# Patient Record
Sex: MALE | Race: WHITE | Employment: OTHER | URBAN - METROPOLITAN AREA
[De-identification: names, ages, dates, MRNs, and addresses within clinical notes are randomized per-mention and may not be internally consistent; named-entity substitution may affect disease eponyms.]

---

## 2017-01-12 ENCOUNTER — TELEPHONE (OUTPATIENT)
Dept: SLEEP MEDICINE | Age: 57
End: 2017-01-12

## 2017-01-12 NOTE — TELEPHONE ENCOUNTER
Patient faxed in [address] form for  to sign. Talita Jazmin is unable to sign form until patient schedules 1 year f/up. Called patient and made him aware, he refused appt. Josinion form is scanned into media tab unsigned.

## 2017-01-15 DIAGNOSIS — E78.00 HYPERCHOLESTEREMIA: ICD-10-CM

## 2017-01-15 DIAGNOSIS — I10 HTN (HYPERTENSION), BENIGN: ICD-10-CM

## 2017-01-15 DIAGNOSIS — E11.9 TYPE 2 DIABETES MELLITUS WITHOUT COMPLICATION, WITHOUT LONG-TERM CURRENT USE OF INSULIN (HCC): Primary | ICD-10-CM

## 2017-01-15 DIAGNOSIS — Z12.5 PROSTATE CANCER SCREENING: ICD-10-CM

## 2017-01-16 ENCOUNTER — OFFICE VISIT (OUTPATIENT)
Dept: INTERNAL MEDICINE CLINIC | Age: 57
End: 2017-01-16

## 2017-01-16 VITALS
DIASTOLIC BLOOD PRESSURE: 80 MMHG | HEART RATE: 75 BPM | TEMPERATURE: 97.9 F | OXYGEN SATURATION: 97 % | BODY MASS INDEX: 37.05 KG/M2 | WEIGHT: 273.2 LBS | SYSTOLIC BLOOD PRESSURE: 140 MMHG

## 2017-01-16 DIAGNOSIS — I25.83 CORONARY ARTERY DISEASE DUE TO LIPID RICH PLAQUE: ICD-10-CM

## 2017-01-16 DIAGNOSIS — Z23 ENCOUNTER FOR IMMUNIZATION: ICD-10-CM

## 2017-01-16 DIAGNOSIS — I25.10 CORONARY ARTERY DISEASE DUE TO LIPID RICH PLAQUE: ICD-10-CM

## 2017-01-16 DIAGNOSIS — E11.9 TYPE 2 DIABETES MELLITUS WITHOUT COMPLICATION, WITHOUT LONG-TERM CURRENT USE OF INSULIN (HCC): Primary | ICD-10-CM

## 2017-01-16 DIAGNOSIS — E78.00 HYPERCHOLESTEREMIA: ICD-10-CM

## 2017-01-16 DIAGNOSIS — I10 HTN (HYPERTENSION), BENIGN: ICD-10-CM

## 2017-01-16 LAB — HBA1C MFR BLD HPLC: 6.5 % (ref 4.8–5.6)

## 2017-01-16 RX ORDER — TADALAFIL 5 MG/1
TABLET ORAL
Qty: 30 TAB | Refills: 11 | Status: SHIPPED | OUTPATIENT
Start: 2017-01-16 | End: 2018-02-19 | Stop reason: SDUPTHER

## 2017-01-16 RX ORDER — INDOMETHACIN 50 MG/1
CAPSULE ORAL
Qty: 30 CAP | Refills: 1 | Status: SHIPPED | OUTPATIENT
Start: 2017-01-16 | End: 2018-04-06 | Stop reason: SDUPTHER

## 2017-01-16 NOTE — MR AVS SNAPSHOT
Visit Information Date & Time Provider Department Dept. Phone Encounter #  
 1/16/2017 10:00 AM Sj Whyte, [address] [facility] [address] [phone] [id number] Follow-up Instructions Return in about 4 months (around 5/16/2017) for dm- 2 htn hld cad. Upcoming Health Maintenance Date Due Pneumococcal 19-64 Medium Risk (1 of 1 - PPSV23) 8/9/1980 COLONOSCOPY 2/4/2014 HEMOGLOBIN A1C Q6M 8/1/2016 INFLUENZA AGE 9 TO ADULT 8/1/2016 MICROALBUMIN Q1 8/3/2016 EYE EXAM RETINAL OR DILATED Q1 8/25/2016 FOOT EXAM Q1 2/1/2017 LIPID PANEL Q1 2/1/2017 DTaP/Tdap/Td series (2 - Td) 10/17/2023 Allergies as of 1/16/2017  Review Complete On: 1/16/2017 By: Ochoa Manley LPN No Known Allergies Current Immunizations  Never Reviewed Name Date Influenza Vaccine (Quad) PF  Incomplete Tdap 10/17/2013 Not reviewed this visit You Were Diagnosed With   
  
 Codes Comments Type 2 diabetes mellitus without complication, without long-term current use of insulin (HCC)    -  Primary ICD-10-CM: E11.9 ICD-9-CM: 250.00 HTN (hypertension), benign     ICD-10-CM: I10 
ICD-9-CM: 401.1 Hypercholesteremia     ICD-10-CM: E78.00 ICD-9-CM: 272.0 Coronary artery disease due to lipid rich plaque     ICD-10-CM: I25.10, I25.83 ICD-9-CM: 414.00, 414.3 Encounter for immunization     ICD-10-CM: J37 ICD-9-CM: V03.89 Vitals BP Pulse Temp Weight(growth percentile) SpO2 BMI  
 140/80 75 97.9 °F (36.6 °C) 273 lb 3.2 oz (123.9 kg) 97% 37.05 kg/m2 Smoking Status Light Tobacco Smoker Vitals History BMI and BSA Data Body Mass Index Body Surface Area 37.05 kg/m 2 2.51 m 2 Preferred Pharmacy Pharmacy Name Phone [id number] [address] [phone] Your Updated Medication List  
  
   
 This list is accurate as of: 1/16/17 10:32 AM.  Always use your most recent med list.  
  
  
  
  
 allopurinol 300 mg tablet Commonly known as:  ZYLOPRIM  
TAKE 1 TABLET BY MOUTH EVERY DAY  
  
 amLODIPine 5 mg tablet Commonly known as:  NORVASC  
  
 aspirin 81 mg tablet Take  by mouth daily. atenolol 100 mg tablet Commonly known as:  TENORMIN Take 1 tablet by mouth daily. bumetanide 1 mg tablet Commonly known as:  Marylen Baas TAKE 1 TABLET EVERY DAY  
  
 indomethacin 50 mg capsule Commonly known as:  INDOCIN  
TAKE ONE CAPSULE BY MOUTH 3 TIMES A DAY AS NEEDED  
  
 LIPITOR 80 mg tablet Generic drug:  atorvastatin Take 80 mg by mouth daily. losartan 50 mg tablet Commonly known as:  COZAAR  
TAKE 1 TABLET EVERY DAY  
  
 metFORMIN 500 mg tablet Commonly known as:  GLUCOPHAGE Take 2 Tabs by mouth two (2) times daily (with meals). multivitamin tablet Commonly known as:  ONE A DAY Take 1 Tab by mouth daily. omega-3 acid ethyl esters 1 gram capsule Commonly known as:  Minoo Chin Take 2 capsules by mouth two (2) times daily (with meals). omeprazole 20 mg capsule Commonly known as:  PRILOSEC  
TAKE ONE CAPSULE BY MOUTH EVERY DAY  
  
 tadalafil 5 mg tablet Commonly known as:  CIALIS  
TAKE 5 MG BY MOUTH AS NEEDED. Prescriptions Sent to Pharmacy Refills  
 indomethacin (INDOCIN) 50 mg capsule 1 Sig: TAKE ONE CAPSULE BY MOUTH 3 TIMES A DAY AS NEEDED Class: Normal  
 Pharmacy: [facility] [address] [place] [address] [address] Ph #: [phone]  
 tadalafil (CIALIS) 5 mg tablet 11 Sig: TAKE 5 MG BY MOUTH AS NEEDED. Class: Normal  
 Pharmacy: [facility] [address] [place] [address] [address] Ph #: [phone] We Performed the Following INFLUENZA VIRUS VAC QUAD,SPLIT,PRESV FREE SYRINGE 3/> YRS IM [id number] CPT(R)] Follow-up Instructions Return in about 4 months (around 5/16/2017) for dm- 2 htn hld cad. Introducing [facility] & HEALTH SERVICES! Dear Jose Lazo: Thank you for requesting a [employer] account. Our records indicate that you already have an active [employer] account. You can access your account anytime at [email or web address] Did you know that you can access your hospital and ER discharge instructions at any time in [employer]? You can also review all of your test results from your hospital stay or ER visit. Additional Information If you have questions, please visit the Frequently Asked Questions section of the [employer] website at [email or web address] Remember, [employer] is NOT to be used for urgent needs. For medical emergencies, dial 911. Now available from your iPhone and Android! Please provide this summary of care documentation to your next provider. Your primary care clinician is listed as Susanna WILLAMS. If you have any questions after today's visit, please call [phone].

## 2017-01-16 NOTE — PROGRESS NOTES
HISTORY OF PRESENT ILLNESS  Raffaele Poon Sr is a 55 y.o. male. HPI     F/u dm-2 htn hld CAD  No gout attacks this year   Needs cialis refill  Has been very good about taking meds since last visit and on a good diet  hsi daughter has been checking his fsbs at home but pt does not recall readings  No cp sxs-sees Dr. Rhett Yung in f/u soon  Less neuropathy sxs in feet, less tingling now  a1c 6.5 today  Still has not had his repeat colonoscopy -Dr Wei Adams but plans on scheduling this study      Patient Active Problem List    Diagnosis Date Noted    PAU on CPAP 02/01/2016    DM [facility]di[facility] type 2[facility] 10/19/2013    Obesity 03/08/2011    CAD (Coronary Artery Disease) - stent 1999 10/20/2009    HTN (hypertension), benign 10/20/2009    Gout 10/20/2009    Hypercholesteremia 10/20/2009    Osteoarthritis of knee 10/20/2009     Current Outpatient Prescriptions   Medication Sig Dispense Refill    indomethacin (INDOCIN) 50 mg capsule TAKE ONE CAPSULE BY MOUTH 3 TIMES A DAY AS NEEDED 30 Cap 1    tadalafil (CIALIS) 5 mg tablet TAKE 5 MG BY MOUTH AS NEEDED. 30 Tab 11    bumetanide (BUMEX) 1 mg tablet TAKE 1 TABLET EVERY DAY 90 Tab 2    metFORMIN (GLUCOPHAGE) 500 mg tablet Take 2 Tabs by mouth two (2) times daily (with meals). 360 Tab 3    multivitamin (ONE A DAY) tablet Take 1 Tab by mouth daily.  amLODIPine (NORVASC) 5 mg tablet   12    omega-3 acid ethyl esters (LOVAZA) 1 gram capsule Take 2 capsules by mouth two (2) times daily (with meals). 360 capsule 3    atenolol (TENORMIN) 100 mg tablet Take 1 tablet by mouth daily. 90 tablet 3    losartan (COZAAR) 50 mg tablet TAKE 1 TABLET EVERY DAY 30 tablet 6    allopurinol (ZYLOPRIM) 300 mg tablet TAKE 1 TABLET BY MOUTH EVERY DAY 30 tablet 6    omeprazole (PRILOSEC) 20 mg capsule TAKE ONE CAPSULE BY MOUTH EVERY DAY 30 Cap 6    atorvastatin (LIPITOR) 80 mg tablet Take 80 mg by mouth daily.  aspirin 81 mg Tab Take  by mouth daily.        No Known Allergies   Lab Results  Component Value Date/Time   Hemoglobin A1c 7.9 02/01/2016 09:46 AM   Hemoglobin A1c 7.2 08/03/2015 10:26 AM   Hemoglobin A1c 7.0 02/05/2014 08:50 AM   Glucose 183 02/01/2016 09:46 AM   Glucose  11/09/2010 09:51 AM   Microalb/Creat ratio (ug/mg creat.) 8.0 08/03/2015 10:26 AM   LDL, calculated 52 02/01/2016 09:46 AM   Creatinine 0.88 02/01/2016 09:46 AM      Lab Results  Component Value Date/Time   Cholesterol, total 155 02/01/2016 09:46 AM   HDL Cholesterol 36 02/01/2016 09:46 AM   LDL, calculated 52 02/01/2016 09:46 AM   Triglyceride 334 02/01/2016 09:46 AM       Lab Results  Component Value Date/Time   GFR est  02/01/2016 09:46 AM   GFR est non-AA 97 02/01/2016 09:46 AM   Creatinine 0.88 02/01/2016 09:46 AM   BUN 10 02/01/2016 09:46 AM   Sodium 139 02/01/2016 09:46 AM   Potassium 4.8 02/01/2016 09:46 AM   Chloride 101 02/01/2016 09:46 AM   CO2 23 02/01/2016 09:46 AM         ROS    Physical Exam   Constitutional: He appears well-developed and well-nourished. No distress. Appears stated age   HENT:   Head: Normocephalic. Cardiovascular: Normal rate, regular rhythm and normal heart sounds. Exam reveals no gallop and no friction rub. No murmur heard. Pulmonary/Chest: Effort normal and breath sounds normal. No respiratory distress. He has no wheezes. He has no rales. He exhibits no tenderness. Abdominal: Soft. Musculoskeletal: He exhibits no edema. Neurological: He is alert. Psychiatric: He has a normal mood and affect. Nursing note and vitals reviewed. ASSESSMENT and PLAN  Florian was seen today for hypertension, diabetes and cholesterol problem.     Diagnoses and all orders for this visit:    Type 2 diabetes mellitus without complication, without long-term current use of insulin (HCC)   amb a1c 6.5 good control   Continue diet and med adherence    HTN (hypertension), benign   Reasonable control, continue medicines    Hypercholesteremia   Continue statin    Coronary artery disease due to lipid rich plaque   Stable , no cp or angina    Hx gout   Refilled indocin, < 3 attacks per year    ED   Refill cialis    Encounter for immunization  -     Influenza virus vaccine (QUADRIVALENT PRES FREE SYRINGE) IM 3 years and older    Other orders  -     indomethacin (INDOCIN) 50 mg capsule; TAKE ONE CAPSULE BY MOUTH 3 TIMES A DAY AS NEEDED  -     tadalafil (CIALIS) 5 mg tablet; TAKE 5 MG BY MOUTH AS NEEDED. Follow-up Disposition:  Return in about 4 months (around 5/16/2017) for dm- 2 htn hld cad.

## 2017-01-17 LAB
ALBUMIN SERPL-MCNC: 4.5 G/DL (ref 3.5–5.5)
ALBUMIN/CREAT UR: 3.7 MG/G CREAT (ref 0–30)
ALBUMIN/GLOB SERPL: 3 {RATIO} (ref 1.1–2.5)
ALP SERPL-CCNC: 97 IU/L (ref 39–117)
ALT SERPL-CCNC: 25 IU/L (ref 0–44)
AST SERPL-CCNC: 17 IU/L (ref 0–40)
BILIRUB SERPL-MCNC: 0.5 MG/DL (ref 0–1.2)
BUN SERPL-MCNC: 15 MG/DL (ref 6–24)
BUN/CREAT SERPL: 18 (ref 9–20)
CALCIUM SERPL-MCNC: 9.3 MG/DL (ref 8.7–10.2)
CHLORIDE SERPL-SCNC: 100 MMOL/L (ref 96–106)
CHOLEST SERPL-MCNC: 154 MG/DL (ref 100–199)
CO2 SERPL-SCNC: 21 MMOL/L (ref 18–29)
CREAT SERPL-MCNC: 0.83 MG/DL (ref 0.76–1.27)
CREAT UR-MCNC: 126.8 MG/DL
ERYTHROCYTE [DISTWIDTH] IN BLOOD BY AUTOMATED COUNT: 13.4 % (ref 12.3–15.4)
GLOBULIN SER CALC-MCNC: 1.5 G/DL (ref 1.5–4.5)
GLUCOSE SERPL-MCNC: 262 MG/DL (ref 65–99)
HCT VFR BLD AUTO: 39.7 % (ref 37.5–51)
HDLC SERPL-MCNC: 32 MG/DL
HGB BLD-MCNC: 13 G/DL (ref 12.6–17.7)
LDLC SERPL CALC-MCNC: ABNORMAL MG/DL (ref 0–99)
MCH RBC QN AUTO: 29 PG (ref 26.6–33)
MCHC RBC AUTO-ENTMCNC: 32.7 G/DL (ref 31.5–35.7)
MCV RBC AUTO: 89 FL (ref 79–97)
MICROALBUMIN UR-MCNC: 4.7 UG/ML
PLATELET # BLD AUTO: 209 X10E3/UL (ref 150–379)
POTASSIUM SERPL-SCNC: 4.6 MMOL/L (ref 3.5–5.2)
PROT SERPL-MCNC: 6 G/DL (ref 6–8.5)
PSA SERPL-MCNC: 1.6 NG/ML (ref 0–4)
RBC # BLD AUTO: 4.48 X10E6/UL (ref 4.14–5.8)
SODIUM SERPL-SCNC: 140 MMOL/L (ref 134–144)
TRIGL SERPL-MCNC: 518 MG/DL (ref 0–149)
VLDLC SERPL CALC-MCNC: ABNORMAL MG/DL (ref 5–40)
WBC # BLD AUTO: 5.6 X10E3/UL (ref 3.4–10.8)

## 2017-01-17 RX ORDER — OMEPRAZOLE 20 MG/1
CAPSULE, DELAYED RELEASE ORAL
Qty: 90 CAP | Refills: 2 | Status: SHIPPED | OUTPATIENT
Start: 2017-01-17 | End: 2017-05-15 | Stop reason: SDUPTHER

## 2017-02-16 RX ORDER — METFORMIN HYDROCHLORIDE 500 MG/1
TABLET ORAL
Qty: 360 TAB | Refills: 1 | Status: SHIPPED | OUTPATIENT
Start: 2017-02-16 | End: 2017-09-23 | Stop reason: SDUPTHER

## 2017-05-14 DIAGNOSIS — E78.00 HYPERCHOLESTEREMIA: ICD-10-CM

## 2017-05-14 DIAGNOSIS — I10 HTN (HYPERTENSION), BENIGN: ICD-10-CM

## 2017-05-14 DIAGNOSIS — I25.10 CORONARY ARTERY DISEASE DUE TO LIPID RICH PLAQUE: ICD-10-CM

## 2017-05-14 DIAGNOSIS — I25.83 CORONARY ARTERY DISEASE DUE TO LIPID RICH PLAQUE: ICD-10-CM

## 2017-05-14 DIAGNOSIS — M10.9 INTERCRITICAL GOUT: ICD-10-CM

## 2017-05-14 DIAGNOSIS — E11.9 TYPE 2 DIABETES MELLITUS WITHOUT COMPLICATION, WITHOUT LONG-TERM CURRENT USE OF INSULIN (HCC): Primary | ICD-10-CM

## 2017-05-15 ENCOUNTER — OFFICE VISIT (OUTPATIENT)
Dept: INTERNAL MEDICINE CLINIC | Age: 57
End: 2017-05-15

## 2017-05-15 VITALS
SYSTOLIC BLOOD PRESSURE: 128 MMHG | DIASTOLIC BLOOD PRESSURE: 86 MMHG | HEIGHT: 72 IN | HEART RATE: 69 BPM | WEIGHT: 275 LBS | RESPIRATION RATE: 18 BRPM | BODY MASS INDEX: 37.25 KG/M2 | TEMPERATURE: 97.7 F | OXYGEN SATURATION: 97 %

## 2017-05-15 DIAGNOSIS — M72.0 DUPUYTREN'S CONTRACTURE OF BOTH HANDS: ICD-10-CM

## 2017-05-15 DIAGNOSIS — M54.32 SCIATICA OF LEFT SIDE: ICD-10-CM

## 2017-05-15 DIAGNOSIS — E78.00 HYPERCHOLESTEREMIA: ICD-10-CM

## 2017-05-15 DIAGNOSIS — E11.9 CONTROLLED TYPE 2 DIABETES MELLITUS WITHOUT COMPLICATION, WITHOUT LONG-TERM CURRENT USE OF INSULIN (HCC): Primary | ICD-10-CM

## 2017-05-15 DIAGNOSIS — M54.32 LEFT SIDED SCIATICA: Primary | ICD-10-CM

## 2017-05-15 DIAGNOSIS — M54.32 SCIATICA OF LEFT SIDE: Primary | ICD-10-CM

## 2017-05-15 DIAGNOSIS — I10 HTN (HYPERTENSION), BENIGN: ICD-10-CM

## 2017-05-15 NOTE — PROGRESS NOTES
Chief Complaint   Patient presents with    Hypertension    Diabetes    Hip Pain     c/o (L) Hip/Leg Pain using OTC Ibuprofen PRN    Hand Pain     Bilateral Hand Pain     1. Have you been to the ER, urgent care clinic since your last visit? Hospitalized since your last visit? No    2. Have you seen or consulted any other health care providers outside of the [address] [place] [address] since your last visit? Include any pap smears or colon screening.  No

## 2017-05-15 NOTE — MR AVS SNAPSHOT
Visit Information Date & Time Provider Department Dept. Phone Encounter #  
 5/15/2017 10:00 AM Kathy Carvajal, [address] [place] [address],[address] [phone] [id number] Follow-up Instructions Return in about 4 months (around 9/15/2017) for dm-2 htn hld. Upcoming Health Maintenance Date Due Pneumococcal 19-64 Medium Risk (1 of 1 - PPSV23) 8/9/1980 COLONOSCOPY 2/4/2014 EYE EXAM RETINAL OR DILATED Q1 8/25/2016 FOOT EXAM Q1 2/1/2017 HEMOGLOBIN A1C Q6M 7/16/2017 INFLUENZA AGE 9 TO ADULT 8/1/2017 MICROALBUMIN Q1 1/16/2018 LIPID PANEL Q1 1/16/2018 DTaP/Tdap/Td series (2 - Td) 10/17/2023 Allergies as of 5/15/2017  Review Complete On: 5/15/2017 By: Luz Hagen LPN No Known Allergies Current Immunizations  Never Reviewed Name Date Influenza Vaccine (Quad) PF 1/16/2017 Tdap 10/17/2013 Not reviewed this visit You Were Diagnosed With   
  
 Codes Comments Controlled type 2 diabetes mellitus without complication, without long-term current use of insulin ([facility]    -  Primary ICD-10-CM: E11.9 ICD-9-CM: 250.00 HTN (hypertension), benign     ICD-10-CM: I10 
ICD-9-CM: 401.1 Hypercholesteremia     ICD-10-CM: E78.00 ICD-9-CM: 272.0 Sciatica of left side     ICD-10-CM: M54.32 
ICD-9-CM: 724.3 Dupuytren's contracture of both hands     ICD-10-CM: M72.0 ICD-9-CM: 728.6 Vitals BP Pulse Temp Resp Height(growth percentile) Weight(growth percentile) 128/86 69 97.7 °F (36.5 °C) (Oral) 18 6' (1.829 m) 275 lb (124.7 kg) SpO2 BMI Smoking Status 97% 37.3 kg/m2 Light Tobacco Smoker Vitals History BMI and BSA Data Body Mass Index Body Surface Area  
 37.3 kg/m 2 2.52 m 2 Preferred Pharmacy Pharmacy Name Phone [id number] [place] [place] - [address] [phone] Your Updated Medication List  
  
   
 This list is accurate as of: 5/15/17 10:55 AM.  Always use your most recent med list.  
  
  
  
  
 allopurinol 300 mg tablet Commonly known as:  ZYLOPRIM  
TAKE 1 TABLET BY MOUTH EVERY DAY  
  
 amLODIPine 5 mg tablet Commonly known as:  NORVASC  
  
 aspirin 81 mg tablet Take  by mouth daily. atenolol 100 mg tablet Commonly known as:  TENORMIN Take 1 tablet by mouth daily. bumetanide 1 mg tablet Commonly known as:  Gurpreet James TAKE 1 TABLET EVERY DAY  
  
 indomethacin 50 mg capsule Commonly known as:  INDOCIN  
TAKE ONE CAPSULE BY MOUTH 3 TIMES A DAY AS NEEDED  
  
 LIPITOR 80 mg tablet Generic drug:  atorvastatin Take 80 mg by mouth daily. losartan 50 mg tablet Commonly known as:  COZAAR  
TAKE 1 TABLET EVERY DAY  
  
 metFORMIN 500 mg tablet Commonly known as:  GLUCOPHAGE  
TAKE 2 TABLETS BY MOUTH TWICE A DAY WITH MEALS  
  
 multivitamin tablet Commonly known as:  ONE A DAY Take 1 Tab by mouth daily. omega-3 acid ethyl esters 1 gram capsule Commonly known as:  Hiren Inoue Take 2 capsules by mouth two (2) times daily (with meals). omeprazole 20 mg capsule Commonly known as:  PRILOSEC  
TAKE ONE CAPSULE BY MOUTH EVERY DAY  
  
 tadalafil 5 mg tablet Commonly known as:  CIALIS  
TAKE 5 MG BY MOUTH AS NEEDED. We Performed the Following HEMOGLOBIN A1C WITH EAG [79850 CPT(R)] LIPID PANEL [01718 CPT(R)] METABOLIC PANEL, COMPREHENSIVE [15620 CPT(R)] REFERRAL TO ORTHOPEDIC SURGERY [REF62 Custom] Comments:  
 Please evaluate patient for dupuytrens contracture REFERRAL TO ORTHOPEDIC SURGERY [REF62 Custom] Comments:  
 Please evaluate patient for bacjk pain and sciatica. Follow-up Instructions Return in about 4 months (around 9/15/2017) for dm-2 htn hld. To-Do List   
 05/15/2017 Imaging:  XR SPINE LUMB 2 OR 3 V Referral Information Referral ID Referred By Referred To [id number] [facility] 2. Go Gutierrez M.D. [address] [place], [address] Visits Status Start Date End Date 1 New Request 5/15/17 5/15/18 If your referral has a status of pending review or denied, additional information will be sent to support the outcome of this decision. Referral ID Referred By Referred To  
 [id number] [place], [address] Phone: [phone] Visits Status Start Date End Date 1 New Request 5/15/17 5/15/18 If your referral has a status of pending review or denied, additional information will be sent to support the outcome of this decision. Introducing [facility] & HEALTH SERVICES! Dear Robbie Magdaleno: Thank you for requesting a [employer] account. Our records indicate that you already have an active [employer] account. You can access your account anytime at [email or web address] Did you know that you can access your hospital and ER discharge instructions at any time in [employer]? You can also review all of your test results from your hospital stay or ER visit. Additional Information If you have questions, please visit the Frequently Asked Questions section of the [employer] website at [email or web address] Remember, [employer] is NOT to be used for urgent needs. For medical emergencies, dial 911. Now available from your iPhone and Android! Please provide this summary of care documentation to your next provider. Your primary care clinician is listed as Hans WILLAMS. If you have any questions after today's visit, please call [phone].

## 2017-05-15 NOTE — PROGRESS NOTES
HISTORY OF PRESENT ILLNESS  Cathy Whitman Sr is a 55 y.o. male. HPI   F/u dm-2 htn hld cad  C/o 2-3 month hx of dull pain in lower back radiating down left leg to foot  , constant pain . advil 200mg q6 hrs helps  fsbs at home 120-160    Patient Active Problem List    Diagnosis Date Noted    PAU on CPAP 02/01/2016    DM type 2 (diabetes mellitus, type 2) ([facility] 10/19/2013    Obesity 03/08/2011    CAD (Coronary Artery Disease) - stent 1999 10/20/2009    HTN (hypertension), benign 10/20/2009    Gout 10/20/2009    Hypercholesteremia 10/20/2009    Osteoarthritis of knee 10/20/2009     Current Outpatient Prescriptions   Medication Sig Dispense Refill    metFORMIN (GLUCOPHAGE) 500 mg tablet TAKE 2 TABLETS BY MOUTH TWICE A DAY WITH MEALS 360 Tab 1    indomethacin (INDOCIN) 50 mg capsule TAKE ONE CAPSULE BY MOUTH 3 TIMES A DAY AS NEEDED 30 Cap 1    tadalafil (CIALIS) 5 mg tablet TAKE 5 MG BY MOUTH AS NEEDED. 30 Tab 11    bumetanide (BUMEX) 1 mg tablet TAKE 1 TABLET EVERY DAY 90 Tab 2    multivitamin (ONE A DAY) tablet Take 1 Tab by mouth daily.  amLODIPine (NORVASC) 5 mg tablet   12    omega-3 acid ethyl esters (LOVAZA) 1 gram capsule Take 2 capsules by mouth two (2) times daily (with meals). 360 capsule 3    atenolol (TENORMIN) 100 mg tablet Take 1 tablet by mouth daily. 90 tablet 3    losartan (COZAAR) 50 mg tablet TAKE 1 TABLET EVERY DAY 30 tablet 6    allopurinol (ZYLOPRIM) 300 mg tablet TAKE 1 TABLET BY MOUTH EVERY DAY 30 tablet 6    omeprazole (PRILOSEC) 20 mg capsule TAKE ONE CAPSULE BY MOUTH EVERY DAY 30 Cap 6    atorvastatin (LIPITOR) 80 mg tablet Take 80 mg by mouth daily.  aspirin 81 mg Tab Take  by mouth daily.        No Known Allergies   Lab Results  Component Value Date/Time   Hemoglobin A1c 7.9 02/01/2016 09:46 AM   Hemoglobin A1c 7.2 08/03/2015 10:26 AM   Hemoglobin A1c 7.0 02/05/2014 08:50 AM   Glucose 262 01/16/2017 10:54 AM   Glucose  11/09/2010 09:51 AM Microalb/Creat ratio (ug/mg creat.) 3.7 01/16/2017 10:54 AM   LDL, calculated Comment 01/16/2017 10:54 AM   Creatinine 0.83 01/16/2017 10:54 AM      Lab Results  Component Value Date/Time   Cholesterol, total 154 01/16/2017 10:54 AM   HDL Cholesterol 32 01/16/2017 10:54 AM   LDL, calculated Comment 01/16/2017 10:54 AM   Triglyceride 518 01/16/2017 10:54 AM       Lab Results  Component Value Date/Time   GFR est  01/16/2017 10:54 AM   GFR est non-AA 99 01/16/2017 10:54 AM   Creatinine 0.83 01/16/2017 10:54 AM   BUN 15 01/16/2017 10:54 AM   Sodium 140 01/16/2017 10:54 AM   Potassium 4.6 01/16/2017 10:54 AM   Chloride 100 01/16/2017 10:54 AM   CO2 21 01/16/2017 10:54 AM         ROS    Physical Exam   Constitutional: He appears well-developed and well-nourished. No distress. Appears stated age   HENT:   Head: Normocephalic. Cardiovascular: Normal rate and regular rhythm. Exam reveals no gallop and no friction rub. No murmur heard. Pulmonary/Chest: Effort normal and breath sounds normal. No respiratory distress. He has no wheezes. He has no rales. He exhibits no tenderness. Abdominal: Soft. Musculoskeletal: He exhibits no edema. SLR neg b/l  B/l dupuyttrens contracture   Neurological: He is alert. Psychiatric: He has a normal mood and affect. Nursing note and vitals reviewed. ASSESSMENT and PLAN  Florian was seen today for hypertension, diabetes, hip pain and hand pain.     Diagnoses and all orders for this visit:    Controlled type 2 diabetes mellitus without complication, without long-term current use of insulin (HCC)  -     HEMOGLOBIN A1C WITH EAG  -     METABOLIC PANEL, COMPREHENSIVE    HTN (hypertension), benign  -     METABOLIC PANEL, COMPREHENSIVE    controlled    Hypercholesteremia  -     METABOLIC PANEL, COMPREHENSIVE  -     LIPID PANEL   On high dose statin, low fat diet and exercise and lovaza    Sciatica of left side  -     XR SPINE LUMB 2 OR 3 V; Future  -     REFERRAL TO ORTHOPEDIC SURGERY    Dupuytren's contracture of both hands  -     REFERRAL TO ORTHOPEDIC SURGERY      Follow-up Disposition:  Return in about 4 months (around 9/15/2017) for dm-2 htn hld.

## 2017-05-24 ENCOUNTER — HOSPITAL ENCOUNTER (OUTPATIENT)
Dept: MRI IMAGING | Age: 57
Discharge: HOME | End: 2017-05-24
Attending: INTERNAL MEDICINE
Payer: COMMERCIAL

## 2017-05-24 DIAGNOSIS — M54.32 SCIATICA OF LEFT SIDE: ICD-10-CM

## 2017-05-24 PROCEDURE — 72148 MRI LUMBAR SPINE W/O DYE: CPT

## 2017-07-10 RX ORDER — ALLOPURINOL 300 MG/1
TABLET ORAL
Qty: 90 TAB | Refills: 2 | Status: SHIPPED | OUTPATIENT
Start: 2017-07-10 | End: 2018-03-23 | Stop reason: SDUPTHER

## 2017-09-26 ENCOUNTER — OFFICE VISIT (OUTPATIENT)
Dept: INTERNAL MEDICINE CLINIC | Age: 57
End: 2017-09-26

## 2017-09-26 VITALS
WEIGHT: 273 LBS | TEMPERATURE: 98.1 F | BODY MASS INDEX: 36.98 KG/M2 | OXYGEN SATURATION: 97 % | HEART RATE: 61 BPM | HEIGHT: 72 IN | SYSTOLIC BLOOD PRESSURE: 140 MMHG | DIASTOLIC BLOOD PRESSURE: 70 MMHG

## 2017-09-26 DIAGNOSIS — Z23 ENCOUNTER FOR IMMUNIZATION: ICD-10-CM

## 2017-09-26 DIAGNOSIS — M72.0 DUPUYTREN'S CONTRACTURE OF BOTH HANDS: ICD-10-CM

## 2017-09-26 DIAGNOSIS — M10.9 INTERCRITICAL GOUT: ICD-10-CM

## 2017-09-26 DIAGNOSIS — K63.5 POLYP OF COLON, UNSPECIFIED PART OF COLON, UNSPECIFIED TYPE: ICD-10-CM

## 2017-09-26 DIAGNOSIS — E78.00 HYPERCHOLESTEREMIA: ICD-10-CM

## 2017-09-26 DIAGNOSIS — I25.10 CORONARY ARTERY DISEASE INVOLVING NATIVE HEART WITHOUT ANGINA PECTORIS, UNSPECIFIED VESSEL OR LESION TYPE: ICD-10-CM

## 2017-09-26 DIAGNOSIS — I10 HTN (HYPERTENSION), BENIGN: ICD-10-CM

## 2017-09-26 DIAGNOSIS — E11.9 TYPE 2 DIABETES MELLITUS WITHOUT COMPLICATION, WITHOUT LONG-TERM CURRENT USE OF INSULIN (HCC): Primary | ICD-10-CM

## 2017-09-26 NOTE — MR AVS SNAPSHOT
Visit Information Date & Time Provider Department Dept. Phone Encounter #  
 9/26/2017  9:15 AM Elie Fu, [address] [place] [address],[address] [phone] [id number] Follow-up Instructions Return for dm-2 htn hld psa. Upcoming Health Maintenance Date Due Pneumococcal 19-64 Medium Risk (1 of 1 - PPSV23) 8/9/1980 COLONOSCOPY 2/4/2014 EYE EXAM RETINAL OR DILATED Q1 8/25/2016 FOOT EXAM Q1 2/1/2017 HEMOGLOBIN A1C Q6M 7/16/2017 INFLUENZA AGE 9 TO ADULT 8/1/2017 MICROALBUMIN Q1 1/16/2018 LIPID PANEL Q1 1/16/2018 DTaP/Tdap/Td series (2 - Td) 10/17/2023 Allergies as of 9/26/2017  Review Complete On: 9/26/2017 By: Daren Rudolph LPN No Known Allergies Current Immunizations  Never Reviewed Name Date Influenza Vaccine (Quad) PF  Incomplete, 1/16/2017 Tdap 10/17/2013 Not reviewed this visit You Were Diagnosed With   
  
 Codes Comments Type 2 diabetes mellitus without complication, without long-term current use of insulin (HCC)    -  Primary ICD-10-CM: E11.9 ICD-9-CM: 250.00 Encounter for immunization     ICD-10-CM: A11 ICD-9-CM: V03.89 HTN (hypertension), benign     ICD-10-CM: I10 
ICD-9-CM: 401.1 Hypercholesteremia     ICD-10-CM: E78.00 ICD-9-CM: 272.0 Intercritical gout     ICD-10-CM: M10.9 ICD-9-CM: 274.9 Polyp of colon, unspecified part of colon, unspecified type     ICD-10-CM: K63.5 ICD-9-CM: 211.3 Coronary artery disease involving native heart without angina pectoris, unspecified vessel or lesion type     ICD-10-CM: I25.10 ICD-9-CM: 414.01 Dupuytren's contracture of both hands     ICD-10-CM: M72.0 ICD-9-CM: 728.6 Vitals BP Pulse Temp Height(growth percentile) Weight(growth percentile) SpO2  
 140/70 61 98.1 °F (36.7 °C) (Oral) 6' (1.829 m) 273 lb (123.8 kg) 97% BMI Smoking Status 37.03 kg/m2 Light Tobacco Smoker Vitals History BMI and BSA Data Body Mass Index Body Surface Area  
 37.03 kg/m 2 2.51 m 2 Preferred Pharmacy Pharmacy Name Phone [id number] [place] - [address] [phone] Your Updated Medication List  
  
   
This list is accurate as of: 9/26/17 10:02 AM.  Always use your most recent med list.  
  
  
  
  
 allopurinol 300 mg tablet Commonly known as:  ZYLOPRIM  
TAKE 1 TABLET BY MOUTH EVERY DAY  
  
 amLODIPine 5 mg tablet Commonly known as:  NORVASC  
  
 aspirin 81 mg tablet Take  by mouth daily. atenolol 100 mg tablet Commonly known as:  TENORMIN Take 1 tablet by mouth daily. bumetanide 1 mg tablet Commonly known as:  Ana Noun TAKE 1 TABLET EVERY DAY  
  
 indomethacin 50 mg capsule Commonly known as:  INDOCIN  
TAKE ONE CAPSULE BY MOUTH 3 TIMES A DAY AS NEEDED  
  
 LIPITOR 80 mg tablet Generic drug:  atorvastatin Take 80 mg by mouth daily. losartan 50 mg tablet Commonly known as:  COZAAR  
TAKE 1 TABLET EVERY DAY  
  
 metFORMIN 500 mg tablet Commonly known as:  GLUCOPHAGE  
TAKE 2 TABLETS BY MOUTH TWICE A DAY WITH MEALS *90 DAY SUPPLY EXCEEDS PLAN LIMITS*  
  
 multivitamin tablet Commonly known as:  ONE A DAY Take 1 Tab by mouth daily. omega-3 acid ethyl esters 1 gram capsule Commonly known as:  Abraham Hacking Take 2 capsules by mouth two (2) times daily (with meals). omeprazole 20 mg capsule Commonly known as:  PRILOSEC  
TAKE ONE CAPSULE BY MOUTH EVERY DAY  
  
 tadalafil 5 mg tablet Commonly known as:  CIALIS  
TAKE 5 MG BY MOUTH AS NEEDED. We Performed the Following HEMOGLOBIN A1C WITH EAG [17617 CPT(R)] INFLUENZA VIRUS VAC QUAD,SPLIT,PRESV FREE SYRINGE IM [id number] CPT(R)] LIPID PANEL [75139 CPT(R)] METABOLIC PANEL, COMPREHENSIVE [95877 CPT(R)] NV IMMUNIZ ADMIN,1 SINGLE/COMB VAC/TOXOID [id number] CPT(R)] REFERRAL TO CARDIOLOGY [FVF05 Custom] REFERRAL TO GASTROENTEROLOGY [IZT13 Custom] REFERRAL TO ORTHOPEDICS [NRC963 Custom] Follow-up Instructions Return for dm-2 htn hld psa. Referral Information Referral ID Referred By Referred To  
  
 [id number] Merrill Whitaker [facility] [address] [place], [address] Visits Status Start Date End Date 1 New Request 9/26/17 9/26/18 If your referral has a status of pending review or denied, additional information will be sent to support the outcome of this decision. Referral ID Referred By Referred To  
 [id number] [facility] [address]  
   [address] [place], [address] Visits Status Start Date End Date 1 New Request 9/26/17 9/26/18 If your referral has a status of pending review or denied, additional information will be sent to support the outcome of this decision. Referral ID Referred By Referred To  
 [id number] FREEDOM WILLAMS [facility]  
   [address] [place], [address] Visits Status Start Date End Date 1 New Request 9/26/17 9/26/18 If your referral has a status of pending review or denied, additional information will be sent to support the outcome of this decision. Introducing [facility]! Dear Conchita Bence: Thank you for requesting a Splash account. Our records indicate that you already have an active Splash account. You can access your account anytime at [email or web address] Did you know that you can access your hospital and ER discharge instructions at any time in Splash? You can also review all of your test results from your hospital stay or ER visit. Additional Information If you have questions, please visit the Frequently Asked Questions section of the Splash website at [email or web address] Remember, Splash is NOT to be used for urgent needs.  For medical emergencies, dial 911. Now available from your iPhone and Android! Please provide this summary of care documentation to your next provider. Your primary care clinician is listed as Meliza WILLAMS. If you have any questions after today's visit, please call [phone].

## 2017-09-26 NOTE — PROGRESS NOTES
HISTORY OF PRESENT ILLNESS  Sarai Blas Sr is a 65 y.o. male. HPI   F/u dm-2 htn hld CAD  No fsbs readings  Had recent eye exam with Dr Efren Escobedo per pt  On low carb diet , small portions  Lots of walkinmg at work and at home  No gout attacks this year   Needs cialis refill  Has been very good about taking meds since last visit and on a good diet  No cp sxs-sees Dr. Matthews Learn in f/u soon  Less neuropathy sxs in feet, less tingling now  Still has not had his repeat colonoscopy -Dr Dee Puga but plans on scheduling this study    Patient Active Problem List    Diagnosis Date Noted    PAU on CPAP 02/01/2016    DM type 2 (diabetes mellitus, type 2) ([facility] [place] [address] 10/19/2013    Obesity 03/08/2011    CAD (Coronary Artery Disease) - stent 1999 10/20/2009    HTN (hypertension), benign 10/20/2009    Gout 10/20/2009    Hypercholesteremia 10/20/2009    Osteoarthritis of knee 10/20/2009     Current Outpatient Prescriptions   Medication Sig Dispense Refill    metFORMIN (GLUCOPHAGE) 500 mg tablet TAKE 2 TABLETS BY MOUTH TWICE A DAY WITH MEALS *90 DAY SUPPLY EXCEEDS PLAN LIMITS* 360 Tab 3    allopurinol (ZYLOPRIM) 300 mg tablet TAKE 1 TABLET BY MOUTH EVERY DAY 90 Tab 2    indomethacin (INDOCIN) 50 mg capsule TAKE ONE CAPSULE BY MOUTH 3 TIMES A DAY AS NEEDED 30 Cap 1    bumetanide (BUMEX) 1 mg tablet TAKE 1 TABLET EVERY DAY 90 Tab 2    multivitamin (ONE A DAY) tablet Take 1 Tab by mouth daily.  amLODIPine (NORVASC) 5 mg tablet   12    omega-3 acid ethyl esters (LOVAZA) 1 gram capsule Take 2 capsules by mouth two (2) times daily (with meals). 360 capsule 3    atenolol (TENORMIN) 100 mg tablet Take 1 tablet by mouth daily. 90 tablet 3    losartan (COZAAR) 50 mg tablet TAKE 1 TABLET EVERY DAY 30 tablet 6    omeprazole (PRILOSEC) 20 mg capsule TAKE ONE CAPSULE BY MOUTH EVERY DAY 30 Cap 6    atorvastatin (LIPITOR) 80 mg tablet Take 80 mg by mouth daily.  aspirin 81 mg Tab Take  by mouth daily.       tadalafil (CIALIS) 5 mg tablet TAKE 5 MG BY MOUTH AS NEEDED. 30 Tab 11     No Known Allergies  Social History   Substance Use Topics    Smoking status: Light Tobacco Smoker     Packs/day: 0.25     Types: Cigarettes    Smokeless tobacco: Never Used    Alcohol use 6.0 oz/week     12 Cans of beer per week      Lab Results  Component Value Date/Time   WBC 5.6 01/16/2017 10:54 AM   HGB 13.0 01/16/2017 10:54 AM   HCT 39.7 01/16/2017 10:54 AM   PLATELET 198 68/49/9430 10:54 AM   MCV 89 01/16/2017 10:54 AM     Lab Results  Component Value Date/Time   Hemoglobin A1c 7.9 02/01/2016 09:46 AM   Hemoglobin A1c 7.2 08/03/2015 10:26 AM   Hemoglobin A1c 7.0 02/05/2014 08:50 AM   Glucose 262 01/16/2017 10:54 AM   Glucose  11/09/2010 09:51 AM   Microalb/Creat ratio (ug/mg creat.) 3.7 01/16/2017 10:54 AM   LDL, calculated Comment 01/16/2017 10:54 AM   Creatinine 0.83 01/16/2017 10:54 AM      Lab Results  Component Value Date/Time   Cholesterol, total 154 01/16/2017 10:54 AM   Cholesterol (POC) 163 10/04/2011 08:23 AM   HDL Cholesterol 32 01/16/2017 10:54 AM   LDL, calculated Comment 01/16/2017 10:54 AM   LDL Cholesterol (POC) N/A 10/04/2011 08:23 AM   Triglyceride 518 01/16/2017 10:54 AM   Triglycerides (POC) 596 10/04/2011 08:23 AM   Lab Results  Component Value Date/Time   GFR est non-AA 99 01/16/2017 10:54 AM   GFR est  01/16/2017 10:54 AM   Creatinine 0.83 01/16/2017 10:54 AM   BUN 15 01/16/2017 10:54 AM   Sodium 140 01/16/2017 10:54 AM   Potassium 4.6 01/16/2017 10:54 AM   Chloride 100 01/16/2017 10:54 AM   CO2 21 01/16/2017 10:54 AM              ROS    Physical Exam   Constitutional: He appears well-developed and well-nourished. No distress. Appears stated age   HENT:   Head: Normocephalic. Cardiovascular: Normal rate, regular rhythm and normal heart sounds. Exam reveals no gallop and no friction rub. No murmur heard. Pulmonary/Chest: Effort normal and breath sounds normal. No respiratory distress. He has no wheezes.  He has no rales. He exhibits no tenderness. Abdominal: Soft. Musculoskeletal: He exhibits no edema. Neurological: He is alert. Psychiatric: He has a normal mood and affect. Nursing note and vitals reviewed. ASSESSMENT and PLAN  Diagnoses and all orders for this visit:    1. Encounter for immunization  -     Influenza virus vaccine (QUADRIVALENT PRES FREE SYRINGE) IM ([id number])  -     IN IMMUNIZ ADMIN,1 SINGLE/COMB VAC/TOXOID    2. Type 2 diabetes mellitus without complication, without long-term current use of insulin (HCC)  -     HEMOGLOBIN A1C WITH EAG  -     METABOLIC PANEL, COMPREHENSIVE   Request last eye exam report   Advised fsbs monitoring    3. HTN (hypertension), benign  -     METABOLIC PANEL, COMPREHENSIVE   140/70 reasonable control    4. Hypercholesteremia  -     LIPID PANEL  -     METABOLIC PANEL, COMPREHENSIVE   Continue statin    5. Intercritical gout   Controlled on allopurinol  6. CAD   Stable but refer to Dr Jeff Kauffman f/u     7. Dupuytrens contracture   Refer ortho MD  8.  Colon polyps   Refer GI Dr. Felicia Bueno  RTC 6 months  Follow-up Disposition: Not on File

## 2017-09-27 LAB
ALBUMIN SERPL-MCNC: 4.3 G/DL (ref 3.5–5.5)
ALBUMIN/GLOB SERPL: 1.9 {RATIO} (ref 1.2–2.2)
ALP SERPL-CCNC: 93 IU/L (ref 39–117)
ALT SERPL-CCNC: 33 IU/L (ref 0–44)
AST SERPL-CCNC: 20 IU/L (ref 0–40)
BILIRUB SERPL-MCNC: 0.6 MG/DL (ref 0–1.2)
BUN SERPL-MCNC: 9 MG/DL (ref 6–24)
BUN/CREAT SERPL: 11 (ref 9–20)
CALCIUM SERPL-MCNC: 9.2 MG/DL (ref 8.7–10.2)
CHLORIDE SERPL-SCNC: 101 MMOL/L (ref 96–106)
CHOLEST SERPL-MCNC: 157 MG/DL (ref 100–199)
CO2 SERPL-SCNC: 24 MMOL/L (ref 18–29)
CREAT SERPL-MCNC: 0.79 MG/DL (ref 0.76–1.27)
EST. AVERAGE GLUCOSE BLD GHB EST-MCNC: 154 MG/DL
GLOBULIN SER CALC-MCNC: 2.3 G/DL (ref 1.5–4.5)
GLUCOSE SERPL-MCNC: 160 MG/DL (ref 65–99)
HBA1C MFR BLD: 7 % (ref 4.8–5.6)
HDLC SERPL-MCNC: 39 MG/DL
LDLC SERPL CALC-MCNC: 55 MG/DL (ref 0–99)
POTASSIUM SERPL-SCNC: 4.9 MMOL/L (ref 3.5–5.2)
PROT SERPL-MCNC: 6.6 G/DL (ref 6–8.5)
SODIUM SERPL-SCNC: 139 MMOL/L (ref 134–144)
TRIGL SERPL-MCNC: 317 MG/DL (ref 0–149)
VLDLC SERPL CALC-MCNC: 63 MG/DL (ref 5–40)

## 2017-10-15 RX ORDER — LOSARTAN POTASSIUM 50 MG/1
TABLET ORAL
Qty: 90 TAB | Refills: 2 | Status: SHIPPED | OUTPATIENT
Start: 2017-10-15 | End: 2018-01-12

## 2017-10-15 RX ORDER — OMEPRAZOLE 20 MG/1
CAPSULE, DELAYED RELEASE ORAL
Qty: 90 CAP | Refills: 2 | Status: SHIPPED | OUTPATIENT
Start: 2017-10-15 | End: 2018-01-12

## 2018-01-11 ENCOUNTER — TELEPHONE (OUTPATIENT)
Dept: SLEEP MEDICINE | Age: 58
End: 2018-01-11

## 2018-01-12 NOTE — PERIOP NOTES
[facility]  Ambulatory Surgery Unit  Pre-operative Instructions    Surgery/Procedure Date  1/18/18            Tentative Arrival Time 0730      1. On the day of your surgery/procedure, please report to the Ambulatory Surgery Unit Registration Desk and sign in at your designated time. The Ambulatory Surgery Unit is located in [facility] on the [facility] side of the [facility] across from the [address] [place] [address]. Please have all of your health insurance cards and a photo ID. 2. You must have someone with you to drive you home, as you should not drive a car for 24 hours following anesthesia. Please make arrangements for a responsible adult friend or family member to stay with you for at least the first 24 hours after your surgery. 3. Do not have anything to eat or drink (including water, gum, mints, coffee, juice) after midnight   1/17/18. This may not apply to medications prescribed by your physician. (Please note below the special instructions with medications to take the morning of surgery, if applicable.)    4. We recommend you do not drink any alcoholic beverages for 24 hours before and after your surgery. 5. Contact your surgeons office for instructions on the following medications: non-steroidal anti-inflammatory drugs (i.e. Advil, Aleve), vitamins, and supplements. (Some surgeons will want you to stop these medications prior to surgery and others may allow you to take them)   **If you are currently taking Plavix, Coumadin, Aspirin and/or other blood-thinning agents, contact your surgeon for instructions. ** Your surgeon will partner with the physician prescribing these medications to determine if it is safe to stop or if you need to continue taking. Please do not stop taking these medications without instructions from your surgeon.     6. In an effort to help prevent surgical site infection, we ask that you shower with an anti-bacterial soap (i.e. Dial or Safeguard) for 3 days prior to and on the morning of surgery, using a fresh towel after each shower. (Please begin this process with fresh bed linens.) Do not apply any lotions, powders, or deodorants after the shower on the day of your procedure. If applicable, please do not shave the operative site for 48 hours prior to surgery. 7. Wear comfortable clothes. Wear glasses instead of contacts. Do not bring any jewelry or money (other than copays or fees as instructed). Do not wear make-up, particularly mascara, the morning of your surgery. Do not wear nail polish, particularly if you are having foot /hand surgery. Wear your hair loose or down, no ponytails, buns, sahara pins or clips. All body piercings must be removed. 8. You should understand that if you do not follow these instructions your surgery may be cancelled. If your physical condition changes (i.e. fever, cold or flu) please contact your surgeon as soon as possible. 9. It is important that you be on time. If a situation occurs where you may be late, or if you have any questions or problems, please call [phone].    10. Your surgery time may be subject to change. You will receive a phone call the day prior to surgery to confirm your arrival time. 11. Pediatric patients: please bring a change of clothes, diapers, bottle/sippy cup, pacifier, etc.      Special Instructions: Take all medications and inhalers, as prescribed, on the morning of surgery with a sip of water EXCEPT: no blood sugar medicine      I understand a pre-operative phone call will be made to verify my surgery time. In the event that I am not available, I give permission for a message to be left on my answering service and/or with another person?       Yes     (instructions given verbally during phone assessment- copy to be given at PAT appointment)     ___________________      ___________________      ________________  (Signature of Patient)          (Witness)                   (Date and Time)

## 2018-01-16 ENCOUNTER — HOSPITAL ENCOUNTER (OUTPATIENT)
Dept: SURGERY | Age: 58
Setting detail: OUTPATIENT SURGERY
Discharge: HOME | End: 2018-01-16
Payer: COMMERCIAL

## 2018-01-16 VITALS
TEMPERATURE: 98.3 F | RESPIRATION RATE: 16 BRPM | SYSTOLIC BLOOD PRESSURE: 160 MMHG | HEART RATE: 65 BPM | DIASTOLIC BLOOD PRESSURE: 85 MMHG | OXYGEN SATURATION: 97 %

## 2018-01-16 LAB
ANION GAP SERPL CALC-SCNC: 6 MMOL/L (ref 5–15)
BUN SERPL-MCNC: 13 MG/DL (ref 6–20)
BUN/CREAT SERPL: 14 (ref 12–20)
CALCIUM SERPL-MCNC: 9.1 MG/DL (ref 8.5–10.1)
CHLORIDE SERPL-SCNC: 104 MMOL/L (ref 97–108)
CO2 SERPL-SCNC: 27 MMOL/L (ref 21–32)
CREAT SERPL-MCNC: 0.94 MG/DL (ref 0.7–1.3)
GLUCOSE SERPL-MCNC: 179 MG/DL (ref 65–100)
POTASSIUM SERPL-SCNC: 4.3 MMOL/L (ref 3.5–5.1)
SODIUM SERPL-SCNC: 137 MMOL/L (ref 136–145)

## 2018-01-16 PROCEDURE — 36415 COLL VENOUS BLD VENIPUNCTURE: CPT | Performed by: ANESTHESIOLOGY

## 2018-01-16 PROCEDURE — 80048 BASIC METABOLIC PNL TOTAL CA: CPT | Performed by: ANESTHESIOLOGY

## 2018-01-17 ENCOUNTER — ANESTHESIA EVENT (OUTPATIENT)
Dept: SURGERY | Age: 58
End: 2018-01-17
Payer: COMMERCIAL

## 2018-01-18 ENCOUNTER — ANESTHESIA (OUTPATIENT)
Dept: SURGERY | Age: 58
End: 2018-01-18
Payer: COMMERCIAL

## 2018-01-18 ENCOUNTER — HOSPITAL ENCOUNTER (OUTPATIENT)
Age: 58
Setting detail: OUTPATIENT SURGERY
Discharge: HOME | End: 2018-01-18
Attending: ORTHOPAEDIC SURGERY | Admitting: ORTHOPAEDIC SURGERY
Payer: COMMERCIAL

## 2018-01-18 VITALS
HEART RATE: 71 BPM | WEIGHT: 271 LBS | SYSTOLIC BLOOD PRESSURE: 116 MMHG | OXYGEN SATURATION: 94 % | DIASTOLIC BLOOD PRESSURE: 60 MMHG | BODY MASS INDEX: 35.92 KG/M2 | TEMPERATURE: 98 F | RESPIRATION RATE: 15 BRPM | HEIGHT: 73 IN

## 2018-01-18 DIAGNOSIS — G89.18 POSTOPERATIVE PAIN OF EXTREMITY: Primary | ICD-10-CM

## 2018-01-18 DIAGNOSIS — M79.609 POSTOPERATIVE PAIN OF EXTREMITY: Primary | ICD-10-CM

## 2018-01-18 LAB
GLUCOSE BLD STRIP.AUTO-MCNC: 185 MG/DL (ref 65–100)
SERVICE CMNT-IMP: ABNORMAL

## 2018-01-18 PROCEDURE — 77030002916 HC SUT ETHLN J&J -A: Performed by: ORTHOPAEDIC SURGERY

## 2018-01-18 PROCEDURE — 82962 GLUCOSE BLOOD TEST: CPT

## 2018-01-18 PROCEDURE — 76210000046 HC AMBSU PH II REC FIRST 0.5 HR: Performed by: ORTHOPAEDIC SURGERY

## 2018-01-18 PROCEDURE — 74011250636 HC RX REV CODE- 250/636: Performed by: ORTHOPAEDIC SURGERY

## 2018-01-18 PROCEDURE — 76030000004 HC AMB SURG OR TIME 2 TO 2.5: Performed by: ORTHOPAEDIC SURGERY

## 2018-01-18 PROCEDURE — 74011000250 HC RX REV CODE- 250: Performed by: ORTHOPAEDIC SURGERY

## 2018-01-18 PROCEDURE — 74011250636 HC RX REV CODE- 250/636

## 2018-01-18 PROCEDURE — 76210000040 HC AMBSU PH I REC FIRST 0.5 HR: Performed by: ORTHOPAEDIC SURGERY

## 2018-01-18 PROCEDURE — 88304 TISSUE EXAM BY PATHOLOGIST: CPT | Performed by: ORTHOPAEDIC SURGERY

## 2018-01-18 PROCEDURE — 74011000250 HC RX REV CODE- 250

## 2018-01-18 PROCEDURE — 77030000032 HC CUF TRNQT ZIMM -B: Performed by: ORTHOPAEDIC SURGERY

## 2018-01-18 PROCEDURE — 77030018836 HC SOL IRR NACL ICUM -A: Performed by: ORTHOPAEDIC SURGERY

## 2018-01-18 PROCEDURE — 74011250636 HC RX REV CODE- 250/636: Performed by: ANESTHESIOLOGY

## 2018-01-18 PROCEDURE — 76060000064 HC AMB SURG ANES 2 TO 2.5 HR: Performed by: ORTHOPAEDIC SURGERY

## 2018-01-18 RX ORDER — LIDOCAINE HYDROCHLORIDE AND EPINEPHRINE 20; 10 MG/ML; UG/ML
INJECTION, SOLUTION INFILTRATION; PERINEURAL AS NEEDED
Status: DISCONTINUED | OUTPATIENT
Start: 2018-01-18 | End: 2018-01-18 | Stop reason: HOSPADM

## 2018-01-18 RX ORDER — MIDAZOLAM HYDROCHLORIDE 1 MG/ML
INJECTION, SOLUTION INTRAMUSCULAR; INTRAVENOUS AS NEEDED
Status: DISCONTINUED | OUTPATIENT
Start: 2018-01-18 | End: 2018-01-18 | Stop reason: HOSPADM

## 2018-01-18 RX ORDER — SODIUM CHLORIDE 0.9 % (FLUSH) 0.9 %
5-10 SYRINGE (ML) INJECTION AS NEEDED
Status: DISCONTINUED | OUTPATIENT
Start: 2018-01-18 | End: 2018-01-18 | Stop reason: HOSPADM

## 2018-01-18 RX ORDER — FENTANYL CITRATE 50 UG/ML
INJECTION, SOLUTION INTRAMUSCULAR; INTRAVENOUS AS NEEDED
Status: DISCONTINUED | OUTPATIENT
Start: 2018-01-18 | End: 2018-01-18 | Stop reason: HOSPADM

## 2018-01-18 RX ORDER — LIDOCAINE HYDROCHLORIDE 10 MG/ML
0.1 INJECTION, SOLUTION EPIDURAL; INFILTRATION; INTRACAUDAL; PERINEURAL AS NEEDED
Status: DISCONTINUED | OUTPATIENT
Start: 2018-01-18 | End: 2018-01-18 | Stop reason: HOSPADM

## 2018-01-18 RX ORDER — PROPOFOL 10 MG/ML
INJECTION, EMULSION INTRAVENOUS AS NEEDED
Status: DISCONTINUED | OUTPATIENT
Start: 2018-01-18 | End: 2018-01-18 | Stop reason: HOSPADM

## 2018-01-18 RX ORDER — DIPHENHYDRAMINE HYDROCHLORIDE 50 MG/ML
12.5 INJECTION, SOLUTION INTRAMUSCULAR; INTRAVENOUS AS NEEDED
Status: DISCONTINUED | OUTPATIENT
Start: 2018-01-18 | End: 2018-01-18 | Stop reason: HOSPADM

## 2018-01-18 RX ORDER — LIDOCAINE HYDROCHLORIDE 20 MG/ML
INJECTION, SOLUTION INFILTRATION; PERINEURAL
Status: DISCONTINUED
Start: 2018-01-18 | End: 2018-01-18 | Stop reason: HOSPADM

## 2018-01-18 RX ORDER — OXYCODONE AND ACETAMINOPHEN 5; 325 MG/1; MG/1
1 TABLET ORAL
Status: DISCONTINUED | OUTPATIENT
Start: 2018-01-18 | End: 2018-01-18 | Stop reason: HOSPADM

## 2018-01-18 RX ORDER — HYDROCODONE BITARTRATE AND ACETAMINOPHEN 5; 325 MG/1; MG/1
1 TABLET ORAL
Qty: 30 TAB | Refills: 0 | Status: SHIPPED | OUTPATIENT
Start: 2018-01-18 | End: 2018-07-26

## 2018-01-18 RX ORDER — CEFAZOLIN SODIUM 1 G/3ML
2 INJECTION, POWDER, FOR SOLUTION INTRAMUSCULAR; INTRAVENOUS ONCE
Status: DISCONTINUED | OUTPATIENT
Start: 2018-01-18 | End: 2018-01-18 | Stop reason: SDUPTHER

## 2018-01-18 RX ORDER — SODIUM CHLORIDE, SODIUM LACTATE, POTASSIUM CHLORIDE, CALCIUM CHLORIDE 600; 310; 30; 20 MG/100ML; MG/100ML; MG/100ML; MG/100ML
25 INJECTION, SOLUTION INTRAVENOUS CONTINUOUS
Status: DISCONTINUED | OUTPATIENT
Start: 2018-01-18 | End: 2018-01-18 | Stop reason: HOSPADM

## 2018-01-18 RX ORDER — LIDOCAINE HYDROCHLORIDE 20 MG/ML
INJECTION, SOLUTION EPIDURAL; INFILTRATION; INTRACAUDAL; PERINEURAL AS NEEDED
Status: DISCONTINUED | OUTPATIENT
Start: 2018-01-18 | End: 2018-01-18 | Stop reason: HOSPADM

## 2018-01-18 RX ORDER — MORPHINE SULFATE 10 MG/ML
2 INJECTION, SOLUTION INTRAMUSCULAR; INTRAVENOUS
Status: DISCONTINUED | OUTPATIENT
Start: 2018-01-18 | End: 2018-01-18 | Stop reason: HOSPADM

## 2018-01-18 RX ORDER — ACETAMINOPHEN 10 MG/ML
INJECTION, SOLUTION INTRAVENOUS AS NEEDED
Status: DISCONTINUED | OUTPATIENT
Start: 2018-01-18 | End: 2018-01-18 | Stop reason: HOSPADM

## 2018-01-18 RX ORDER — HYDROMORPHONE HYDROCHLORIDE 1 MG/ML
.2-.5 INJECTION, SOLUTION INTRAMUSCULAR; INTRAVENOUS; SUBCUTANEOUS ONCE
Status: DISCONTINUED | OUTPATIENT
Start: 2018-01-18 | End: 2018-01-18 | Stop reason: HOSPADM

## 2018-01-18 RX ORDER — SODIUM CHLORIDE 9 MG/ML
INJECTION, SOLUTION INTRAVENOUS
Status: DISCONTINUED | OUTPATIENT
Start: 2018-01-18 | End: 2018-01-18 | Stop reason: HOSPADM

## 2018-01-18 RX ORDER — VANCOMYCIN/0.9 % SOD CHLORIDE 1.5G/250ML
PLASTIC BAG, INJECTION (ML) INTRAVENOUS
Status: DISCONTINUED
Start: 2018-01-18 | End: 2018-01-18 | Stop reason: HOSPADM

## 2018-01-18 RX ORDER — ONDANSETRON 2 MG/ML
INJECTION INTRAMUSCULAR; INTRAVENOUS AS NEEDED
Status: DISCONTINUED | OUTPATIENT
Start: 2018-01-18 | End: 2018-01-18 | Stop reason: HOSPADM

## 2018-01-18 RX ORDER — SODIUM CHLORIDE 0.9 % (FLUSH) 0.9 %
5-10 SYRINGE (ML) INJECTION EVERY 8 HOURS
Status: DISCONTINUED | OUTPATIENT
Start: 2018-01-18 | End: 2018-01-18 | Stop reason: HOSPADM

## 2018-01-18 RX ORDER — FENTANYL CITRATE 50 UG/ML
25 INJECTION, SOLUTION INTRAMUSCULAR; INTRAVENOUS
Status: DISCONTINUED | OUTPATIENT
Start: 2018-01-18 | End: 2018-01-18 | Stop reason: HOSPADM

## 2018-01-18 RX ORDER — ONDANSETRON 2 MG/ML
4 INJECTION INTRAMUSCULAR; INTRAVENOUS AS NEEDED
Status: DISCONTINUED | OUTPATIENT
Start: 2018-01-18 | End: 2018-01-18 | Stop reason: HOSPADM

## 2018-01-18 RX ORDER — PROPOFOL 10 MG/ML
INJECTION, EMULSION INTRAVENOUS
Status: DISCONTINUED | OUTPATIENT
Start: 2018-01-18 | End: 2018-01-18 | Stop reason: HOSPADM

## 2018-01-18 RX ADMIN — MIDAZOLAM HYDROCHLORIDE 2 MG: 1 INJECTION, SOLUTION INTRAMUSCULAR; INTRAVENOUS at 09:48

## 2018-01-18 RX ADMIN — FENTANYL CITRATE 100 MCG: 50 INJECTION, SOLUTION INTRAMUSCULAR; INTRAVENOUS at 09:44

## 2018-01-18 RX ADMIN — CEFAZOLIN 3 G: 1 INJECTION, POWDER, FOR SOLUTION INTRAMUSCULAR; INTRAVENOUS; PARENTERAL at 09:43

## 2018-01-18 RX ADMIN — ACETAMINOPHEN 1000 MG: 10 INJECTION, SOLUTION INTRAVENOUS at 11:28

## 2018-01-18 RX ADMIN — PROPOFOL 150 MG: 10 INJECTION, EMULSION INTRAVENOUS at 10:40

## 2018-01-18 RX ADMIN — LIDOCAINE HYDROCHLORIDE 40 MG: 20 INJECTION, SOLUTION EPIDURAL; INFILTRATION; INTRACAUDAL; PERINEURAL at 09:58

## 2018-01-18 RX ADMIN — ONDANSETRON 4 MG: 2 INJECTION INTRAMUSCULAR; INTRAVENOUS at 11:15

## 2018-01-18 RX ADMIN — SODIUM CHLORIDE, SODIUM LACTATE, POTASSIUM CHLORIDE, AND CALCIUM CHLORIDE 25 ML/HR: 600; 310; 30; 20 INJECTION, SOLUTION INTRAVENOUS at 08:07

## 2018-01-18 RX ADMIN — PROPOFOL 50 MCG/KG/MIN: 10 INJECTION, EMULSION INTRAVENOUS at 09:47

## 2018-01-18 RX ADMIN — FENTANYL CITRATE 50 MCG: 50 INJECTION, SOLUTION INTRAMUSCULAR; INTRAVENOUS at 10:37

## 2018-01-18 RX ADMIN — MIDAZOLAM HYDROCHLORIDE 2 MG: 1 INJECTION, SOLUTION INTRAMUSCULAR; INTRAVENOUS at 09:44

## 2018-01-18 NOTE — ANESTHESIA POSTPROCEDURE EVALUATION
Post-Anesthesia Evaluation and Assessment    Patient: Kartik Gilliland Sr MRN: [id number]  SSN: [id number]    YOB: 1960  Age: 65 y.o. Sex: male       Cardiovascular Function/Vital Signs  Visit Vitals    /60    Pulse 71    Temp 36.7 °C (98 °F)    Resp 15    Ht 6' 1\" (1.854 m)    Wt 122.9 kg (271 lb)    SpO2 94%    BMI 35.75 kg/m2       Patient is status post general - backup anesthesia for Procedure(s):  PARTIAL FASCIOTOMY RIGHT PALM/LITTLE FINGER. Nausea/Vomiting: None    Postoperative hydration reviewed and adequate. Pain:  Pain Scale 1: Numeric (0 - 10) (01/18/18 1232)  Pain Intensity 1: 0 (01/18/18 1232)   Managed    Neurological Status:   Neuro (WDL): Within Defined Limits (01/18/18 1232)   At baseline    Mental Status and Level of Consciousness: Arousable    Pulmonary Status:   O2 Device: Room air (01/18/18 1232)   Adequate oxygenation and airway patent    Complications related to anesthesia: None    Post-anesthesia assessment completed.  No concerns    Signed By: Radha Castro MD     January 18, 2018

## 2018-01-18 NOTE — DISCHARGE INSTRUCTIONS
Discharge Instructions for:    Kesha Hunter / [id number] : 1960    Admitted 2018 Discharged: 2018       Postoperative Hand Surgery Instructions      Elevation:  Elevation is one of the most important things to do following your surgery. Not only does it decrease swelling, but it also decreases pain. For hand or wrist surgery, keep the arm in your sling while up and about, with your hand above your elbow. When lying down, keep your arm propped up on a few pillows, so that your fingers are pointing towards the ceiling. Finger Motion:  **It is very important that you begin moving your fingers immediately after surgery, as often as you can, in order to prevent stiffness. Wiggling your fingers is not the same as moving them - moving your fingers involves bending them until they touch the dressing   (if possible). You should use your other hand to gently move the fingers on the operative hand if necessary. Dressings:  Please leave the surgical dressing in place until you are seen in the office for your first post-operative appointment with Dr Binh Hough. The dressing helps to prevent infection and positions the extremity to protect the surgical procedure  that was performed. Bathing and showering are allowed as long as the dressing is kept dry. To keep your dressing dry while bathing, simply place a plastic bag (bread bag, newspaper bag, trash bag or subway sandwich bag) over the dressing and seal it with tape or a rubber band. Medications: You have likely been given a prescription for pain medication. Please follow the instructions closely. It is safe to take up to 600mg of Ibuprofen (Advil or Motrin) along with the pain prescription as long you are not allergic to it, are not on blood thinners, and do not have gastric reflux or an ulcer. However, do not take any additional tylenol/acetaminophen if your prescription contains tylenol.       Note that my policy is to give only one prescription for pain medication at the time of the surgery - if you use it up quickly, then you will have no more pain medication left after only a few days, and I will not give you another prescription. So use your pain medication sparingly, and only when necessary! If you have new or increasing numbness after any numbing medicine wears off (12-24 hours for regional blocks, 3 hours for local), call the office immediately. If you experience nausea, vomiting or itching with the pain medication, please call the office during regular office hours. Refills for pain medication prescriptions ARE NOT given on the weekend or after regular office hours. If antibiotics have been prescribed, please be sure to complete the entire prescription. Post-Operative Appointments:  Dr. Rob Tanner likes to see you back in the office about 10-14 days following your surgery to change the post-operative dressing and remove any necessary sutures or staples. If you have not received a follow up appointment card please contact our office at [phone]. *If you have any questions or concerns or experience any sudden changes in your condition, please call our office at [phone]*    >>>You received an IV form of Tylenol 1000mg during your surgery, you may take tylenol (or pain medication containing Tylenol or Acetaminophen) in 6 hours at 5:30pm    [address]  Surgical patients are the #1 risk for DVT and PE. WHAT IS DVT? WHAT IS PE?  DVT is a serious condition where blood clots develop deep in the veins of the legs. PE occurs when a blood clot breaks loose from the wall of a vein and travels to the lungs blocking the pulmonary artery or one of its branches impairing blood flow from the heart, which could result in death.   RISK FACTORS   Surgery lasting longer than 45 minutes   History of inflammatory bowel disease   Oral contraceptive or hormone replacement therapy   Immobilization   Varicose veins / swollen legs   Smoking    CHF / Acute MI / Irregular heart beat   Family history of thrombosis   General anesthesia greater than 2 hours   Obesity   Infection of less than one month   Less than 1 month postpartum   COPD / Pneumonia   Arthroscopic surgery   Malignancy / cancer   Spine surgery   Blood abnormalities   Stroke / Paralysis / Coma    SIGNS AND SYMPTOMS OF DEEP VEIN TROMBOSIS  Usually occurs in one leg, above or below the knee   Swelling - one calf or thigh may be larger than the other   Feeling increased warmth in the area of the leg that is swollen or painful   Leg pain, which may increase when standing or walking   Swelling along the vein of the leg   When swollen areas is pressed with a finger, a depression may remain   Tenderness of the leg that may be confined to one area   Change in leg color (bluish or red)    SIGNS AND SYMPTOMS OF PULMONARY EMBOLUS   Chest pain that gets worse with deep breath, coughing or chest movement   Coughing up blood   Sweating   Shortness of breath or difficulty breathing   Rapid heart beat   Lightheadedness    HOW TO REDUCE THE POSSIBLE RISK OF DVT   Exercise - simple activities as rotating ankles and wrists, wiggling toes and fingers, tightening and relaxing muscles in calves and thighs promotes circulation while recovering from surgery. Please do these exercises every hour during waking hours   OPAL hose - If you have been given white support hose while having surgery, wear them home. You may remove them for half an hour every 8-hour period and stop wearing them 48 hours after surgery or as prescribed by your physician. OPAL hose may be reused for air travel or extended car travel   Take mediation as prescribed by your physician (Lovenox, Coumadin, Aspirin)   Resume your normal activities as soon as your doctor advises you to do so.    Remember, when traveling, to [place] your legs frequently. Wear non skid shoes when OPAL hose are on. They are very slippery! PATIENTS WHO BELIEVE THEY MAY BE EXPERIENCING SIGNS AND SYMPTOMS OF DVT OR PE SHOULD SEEK MEDICAL HELP IMMEDIATELY    DO NOT TAKE TYLENOL/ACETAMINOPHEN WITH PERCOCET, [address], [place] OR [place]. TAKE NARCOTIC PAIN MEDICATIONS WITH FOOD     If given 2 pain narcotics do NOT take together! Narcotics tend to be constipating, we suggest taking a stool softener such as Colace or Miralax (follow package instructions). DO NOT DRIVE WHILE TAKING NARCOTIC PAIN MEDICATIONS. DO NOT TAKE SLEEPING MEDICATIONS OR ANTIANXIETY MEDICATIONS WHILE TAKING NARCOTIC PAIN MEDICATIONS,  ESPECIALLY THE NIGHT OF ANESTHESIA. CPAP PATIENTS BE SURE TO WEAR MACHINE WHENEVER NAPPING OR SLEEPING. DISCHARGE SUMMARY from Nurse    The following personal items collected during your admission are returned to you:   Dental Appliance: Dental Appliances: Uppers  Vision: Visual Aid: Contacts, At home  Hearing Aid:    Jewelry: Jewelry: None  Clothing: Clothing: With patient  Other Valuables: Other Valuables: None  Valuables sent to safe:        PATIENT INSTRUCTIONS:    After General Anesthesia or Intravenous Sedation, for 24 hours or while taking prescription Narcotics:        Someone should be with you for the next 24 hours. For your own safety, a responsible adult must drive you home. · Limit your activities  · Recommended activity: Rest today, up with assistance today. Do not climb stairs or shower unattended for the next 24 hours. · Please start with a soft bland diet and advance as tolerated (no nausea) to regular diet. · If you have a sore throat you should try the following: fluids, warm salt water gargles, or throat lozenges. If it does not improve after several days please follow up with your primary physician.   · Do not drive and operate hazardous machinery  · Do not make important personal or business decisions  · Do  not drink alcoholic beverages  · If you have not urinated within 8 hours after discharge, please contact your surgeon on call. Report the following to your surgeon:  · Excessive pain, swelling, redness or odor of or around the surgical area  · Temperature over 100.5  · Nausea and vomiting lasting longer than 4 hours or if unable to take medications  · Any signs of decreased circulation or nerve impairment to extremity: change in color, persistent  numbness, tingling, coldness or increase pain      · You will receive a Post Operative Call from one of the Recovery Room Nurses on the day after your surgery to check on you. It is very important for us to know how you are recovering after your surgery. If you have an issue or need to speak with someone, please call your surgeon, do not wait for the post operative call. · You may receive an e-mail or letter in the mail from [facility] regarding your experience with us in the Ambulatory Surgery Unit. Your feedback is valuable to us and we appreciate your participation in the survey. · If the above instructions are not adequate, please contact Ekta Munoz RN, [facility] anesthesia Nurse Manager or our Anesthesiologist, at [phone]. If you are having problems after your surgery, call the physician at his office number. · We wish you a speedy recovery ? What to do at Home:      *  Please give a list of your current medications to your Primary Care Provider. *  Please update this list whenever your medications are discontinued, doses are      changed, or new medications (including over-the-counter products) are added. *  Please carry medication information at all times in case of emergency situations.             These are general instructions for a healthy lifestyle:    No smoking/ No tobacco products/ Avoid exposure to second hand smoke    Surgeon General's Warning:  Quitting smoking now greatly reduces serious risk to your health. Obesity, smoking, and sedentary lifestyle greatly increases your risk for illness    A healthy diet, regular physical exercise & weight monitoring are important for maintaining a healthy lifestyle    You may be retaining fluid if you have a history of heart failure or if you experience any of the following symptoms:  Weight gain of 3 pounds or more overnight or 5 pounds in a week, increased swelling in our hands or feet or shortness of breath while lying flat in bed. Please call your doctor as soon as you notice any of these symptoms; do not wait until your next office visit. Recognize signs and symptoms of STROKE:    B - Balance  E - Eyes    F-  Face looks uneven    A-  Arms unable to move or move even    S-  Speech slurred or non-existent    T-  Time-call 911 as soon as signs and symptoms begin-DO NOT go       Back to bed or wait to see if you get better-TIME IS BRAIN. If you have not received your influenza and/or pneumococcal vaccine, please follow up with your primary care physician. The discharge information has been reviewed with the patient and spouse. The patient and spouse verbalized understanding.

## 2018-01-18 NOTE — IP AVS SNAPSHOT
[address] [facility] 
[phone] Patient: Zafar Orr 
MRN: [id number] Junie Montes About your hospitalization You were admitted on:  January 18, 2018 You last received care in the:  [facility] PACU You were discharged on:  January 18, 2018 Why you were hospitalized Your primary diagnosis was:  Not on File Follow-up Information Follow up With Details Comments Contact Info Leslye Zapata MD   [address] [facility] [address] [facility] 
[phone] Discharge Orders None A check suly indicates which time of day the medication should be taken. My Medications START taking these medications Instructions Each Dose to Equal  
 Morning Noon Evening Bedtime HYDROcodone-acetaminophen 5-325 mg per tablet Commonly known as:  Anand Granados Your last dose was: Your next dose is: Take 1 Tab by mouth every four (4) hours as needed for Pain. Max Daily Amount: 6 Tabs. 1 Tab CONTINUE taking these medications Instructions Each Dose to Equal  
 Morning Noon Evening Bedtime  
 allopurinol 300 mg tablet Commonly known as:  Nery Muñoz Your last dose was: Your next dose is: TAKE 1 TABLET BY MOUTH EVERY DAY  
     
   
   
   
  
 amLODIPine 5 mg tablet Commonly known as:  Jillyn Boast Your last dose was: Your next dose is: Take 5 mg by mouth daily. 5 mg  
    
   
   
   
  
 aspirin 81 mg tablet Your last dose was: Your next dose is: Take 81 mg by mouth daily. 81 mg  
    
   
   
   
  
 atenolol 100 mg tablet Commonly known as:  TENORMIN Your last dose was: Your next dose is: Take 1 tablet by mouth daily. 100 mg  
    
   
   
   
  
 bumetanide 1 mg tablet Commonly known as:  Romero Abdullahi Your last dose was: Your next dose is: TAKE 1 TABLET EVERY DAY  
     
   
   
   
  
 indomethacin 50 mg capsule Commonly known as:  INDOCIN Your last dose was: Your next dose is: TAKE ONE CAPSULE BY MOUTH 3 TIMES A DAY AS NEEDED  
     
   
   
   
  
 LIPITOR 80 mg tablet Generic drug:  atorvastatin Your last dose was: Your next dose is: Take 80 mg by mouth daily. 80 mg  
    
   
   
   
  
 losartan 50 mg tablet Commonly known as:  COZAAR Your last dose was: Your next dose is: TAKE 1 TABLET EVERY DAY  
     
   
   
   
  
 metFORMIN 500 mg tablet Commonly known as:  GLUCOPHAGE Your last dose was: Your next dose is: TAKE 2 TABLETS BY MOUTH TWICE A DAY WITH MEALS *90 DAY SUPPLY EXCEEDS PLAN LIMITS*  
     
   
   
   
  
 multivitamin tablet Commonly known as:  ONE A DAY Your last dose was: Your next dose is: Take 1 Tab by mouth daily. 1 Tab  
    
   
   
   
  
 omega-3 acid ethyl esters 1 gram capsule Commonly known as:  Mohini Has Your last dose was: Your next dose is: Take 2 capsules by mouth two (2) times daily (with meals). 2 g  
    
   
   
   
  
 omeprazole 20 mg capsule Commonly known as:  PRILOSEC Your last dose was: Your next dose is: TAKE ONE CAPSULE BY MOUTH EVERY DAY  
     
   
   
   
  
 tadalafil 5 mg tablet Commonly known as:  CIALIS Your last dose was: Your next dose is: TAKE 5 MG BY MOUTH AS NEEDED. Where to Get Your Medications Information on where to get these meds will be given to you by the nurse or doctor. ! Ask your nurse or doctor about these medications HYDROcodone-acetaminophen 5-325 mg per tablet Discharge Instructions Discharge Instructions for: Ryne Barb / [id number] : 1960 Admitted 2018 Discharged: 2018 Postoperative Hand Surgery Instructions Elevation: 
Elevation is one of the most important things to do following your surgery. Not only does it decrease swelling, but it also decreases pain. For hand or wrist surgery, keep the arm in your sling while up and about, with your hand above your elbow. When lying down, keep your arm propped up on a few pillows, so that your fingers are pointing towards the ceiling. Finger Motion: **It is very important that you begin moving your fingers immediately after surgery, as often as you can, in order to prevent stiffness. Wiggling your fingers is not the same as moving them - moving your fingers involves bending them until they touch the dressing  
(if possible). You should use your other hand to gently move the fingers on the operative hand if necessary. Dressings: 
Please leave the surgical dressing in place until you are seen in the office for your first post-operative appointment with Dr Lee Shoemaker. The dressing helps to prevent infection and positions the extremity to protect the surgical procedure  that was performed. Bathing and showering are allowed as long as the dressing is kept dry. To keep your dressing dry while bathing, simply place a plastic bag (bread bag, newspaper bag, trash bag or subway sandwich bag) over the dressing and seal it with tape or a rubber band. Medications: You have likely been given a prescription for pain medication. Please follow the instructions closely. It is safe to take up to 600mg of Ibuprofen (Advil or Motrin) along with the pain prescription as long you are not allergic to it, are not on blood thinners, and do not have gastric reflux or an ulcer. However, do not take any additional tylenol/acetaminophen if your prescription contains tylenol. Note that my policy is to give only one prescription for pain medication at the time of the surgery - if you use it up quickly, then you will have no more pain medication left after only a few days, and I will not give you another prescription. So use your pain medication sparingly, and only when necessary! If you have new or increasing numbness after any numbing medicine wears off (12-24 hours for regional blocks, 3 hours for local), call the office immediately. If you experience nausea, vomiting or itching with the pain medication, please call the office during regular office hours. Refills for pain medication prescriptions ARE NOT given on the weekend or after regular office hours. If antibiotics have been prescribed, please be sure to complete the entire prescription. Post-Operative Appointments: 
Dr. Lm Gibbons likes to see you back in the office about 10-14 days following your surgery to change the post-operative dressing and remove any necessary sutures or staples. If you have not received a follow up appointment card please contact our office at [phone]. *If you have any questions or concerns or experience any sudden changes in your condition, please call our office at [phone]* 
 
>>>You received an IV form of Tylenol 1000mg during your surgery, you may take tylenol (or pain medication containing Tylenol or Acetaminophen) in 6 hours at 5:30pm 
 
[address] Surgical patients are the #1 risk for DVT and PE. WHAT IS DVT? WHAT IS PE? 
DVT is a serious condition where blood clots develop deep in the veins of the legs. PE occurs when a blood clot breaks loose from the wall of a vein and travels to the lungs blocking the pulmonary artery or one of its branches impairing blood flow from the heart, which could result in death. RISK FACTORS 
? Surgery lasting longer than 45 minutes ? History of inflammatory bowel disease 
? Oral contraceptive or hormone replacement therapy ? Immobilization ? Varicose veins / swollen legs ? Smoking  
? CHF / Acute MI / Irregular heart beat ? Family history of thrombosis ? General anesthesia greater than 2 hours ? Obesity ? Infection of less than one month ? Less than 1 month postpartum 
? COPD / Pneumonia ? Arthroscopic surgery ? Malignancy / cancer ? Spine surgery ? Blood abnormalities ? Stroke / Paralysis / Coma SIGNS AND SYMPTOMS OF DEEP VEIN TROMBOSIS Usually occurs in one leg, above or below the knee ? Swelling  one calf or thigh may be larger than the other ? Feeling increased warmth in the area of the leg that is swollen or painful ? Leg pain, which may increase when standing or walking ? Swelling along the vein of the leg ? When swollen areas is pressed with a finger, a depression may remain ? Tenderness of the leg that may be confined to one area ? Change in leg color (bluish or red) SIGNS AND SYMPTOMS OF PULMONARY EMBOLUS 
? Chest pain that gets worse with deep breath, coughing or chest movement ? Coughing up blood ? Sweating ? Shortness of breath or difficulty breathing ? Rapid heart beat ? Lightheadedness HOW TO REDUCE THE POSSIBLE RISK OF DVT ? Exercise  simple activities as rotating ankles and wrists, wiggling toes and fingers, tightening and relaxing muscles in calves and thighs promotes circulation while recovering from surgery. Please do these exercises every hour during waking hours ? OPAL hose  If you have been given white support hose while having surgery, wear them home. You may remove them for half an hour every 8-hour period and stop wearing them 48 hours after surgery or as prescribed by your physician. OPAL hose may be reused for air travel or extended car travel ? Take mediation as prescribed by your physician (Lovenox, Coumadin, Aspirin) ? Resume your normal activities as soon as your doctor advises you to do so. 
? Remember, when traveling, to [place] your legs frequently. Wear non skid shoes when OPAL hose are on. They are very slippery! PATIENTS WHO BELIEVE THEY MAY BE EXPERIENCING SIGNS AND SYMPTOMS OF DVT OR PE SHOULD SEEK MEDICAL HELP IMMEDIATELY 
 
DO NOT TAKE TYLENOL/ACETAMINOPHEN WITH PERCOCET, [address], [place] OR VICODEN. TAKE NARCOTIC PAIN MEDICATIONS WITH FOOD If given 2 pain narcotics do NOT take together! Narcotics tend to be constipating, we suggest taking a stool softener such as Colace or Miralax (follow package instructions). DO NOT DRIVE WHILE TAKING NARCOTIC PAIN MEDICATIONS. DO NOT TAKE SLEEPING MEDICATIONS OR ANTIANXIETY MEDICATIONS WHILE TAKING NARCOTIC PAIN MEDICATIONS,  ESPECIALLY THE NIGHT OF ANESTHESIA. CPAP PATIENTS BE SURE TO WEAR MACHINE WHENEVER NAPPING OR SLEEPING. DISCHARGE SUMMARY from Nurse The following personal items collected during your admission are returned to you:  
Dental Appliance: Dental Appliances: Uppers Vision: Visual Aid: Contacts, At home Hearing Aid:   
Jewelry: Jewelry: None Clothing: Clothing: With patient Other Valuables: Other Valuables: None Valuables sent to safe:   
 
 
PATIENT INSTRUCTIONS: 
 
 
B - Balance E - Eyes F-  Face looks uneven A-  Arms unable to move or move even S-  Speech slurred or non-existent T-  Time-call 911 as soon as signs and symptoms begin-DO NOT go Back to bed or wait to see if you get better-TIME IS BRAIN. If you have not received your influenza and/or pneumococcal vaccine, please follow up with your primary care physician. The discharge information has been reviewed with the patient and spouse. The patient and spouse verbalized understanding. Introducing [facility]! Dear Wolf Vaca: Thank you for requesting a [employer] account. Our records indicate that you already have an active [employer] account. You can access your account anytime at [email or web address] Did you know that you can access your hospital and ER discharge instructions at any time in [employer]? You can also review all of your test results from your hospital stay or ER visit. Additional Information If you have questions, please visit the Frequently Asked Questions section of the [employer] website at [email or web address] Remember, [employer] is NOT to be used for urgent needs. For medical emergencies, dial 911. Now available from your iPhone and Android! Providers Seen During Your Hospitalization Provider Specialty Primary office phone Tonnie Rinne, MD Orthopedic Surgery [phone] Your Primary Care Physician (PCP) Primary Care Physician Office Phone Office Fax Charlene Orozco [phone] You are allergic to the following No active allergies Recent Documentation Height Weight BMI Smoking Status 1.854 m 122.9 kg 35.75 kg/m2 Light Tobacco Smoker Emergency Contacts Name Discharge Info Relation Home Work Mobile Tanya Griggs DISCHARGE CAREGIVER [3] Child [2] [phone] Chula Gann DISCHARGE CAREGIVER [3] Girlfriend [18]   [phone] Patient Belongings The following personal items are in your possession at time of discharge: 
  Dental Appliances: Uppers  Visual Aid: Contacts, At home      Home Medications: None   Jewelry: None  Clothing: With patient    Other Valuables: None Discharge Instructions Attachments/References HYDROCODONE/ACETAMINOPHEN (BY MOUTH) (ENGLISH) Patient Handouts Hydrocodone/Acetaminophen (By mouth) Acetaminophen (y-wfcs-z-MIN-oh-fen), Hydrocodone Bitartrate (exw-tgwh-QIL-done bye-TAR-trate) Treats pain. This medicine contains a narcotic pain reliever. Brand Name(s): Hycet, Lorcet, Lorcet HD, Lorcet Plus, Lortab 10/325, Lortab 5/325, Lortab 7.5/325, Lortab Elixir, Norco, Verdrocet, Vicodin, Vicodin ES, Vicodin HP, Xodol, Xodol 5/300 There may be other brand names for this medicine. When This Medicine Should Not Be Used: This medicine is not right for everyone. Do not use it if you had an allergic reaction to acetaminophen, hydrocodone, or other narcotic medicines, or stomach or bowel blockage (including paralytic ileus). How to Use This Medicine:  
Capsule, Liquid, Tablet · Your doctor will tell you how much medicine to use. Do not use more than directed. · An overdose can be dangerous. Follow directions carefully so you do not get too much medicine at one time. · Oral liquid: Measure the oral liquid medicine with a marked measuring spoon, oral syringe, or medicine cup. · Drink plenty of liquids to help avoid constipation. · This medicine should come with a Medication Guide. Ask your pharmacist for a copy if you do not have one. · Missed dose: Take a dose as soon as you remember. If it is almost time for your next dose, wait until then and take a regular dose. Do not take extra medicine to make up for a missed dose. · Store the medicine in a closed container at room temperature, away from heat, moisture, and direct light. Flush any unused Norco® tablets down the toilet. Drugs and Foods to Avoid: Ask your doctor or pharmacist before using any other medicine, including over-the-counter medicines, vitamins, and herbal products. · Do not use this medicine if you are using or have used an MAO inhibitor within the past 14 days. · Some medicines can affect how hydrocodone/acetaminophen works. Tell your doctor if you are using any of the following: ¨ Carbamazepine, erythromycin, ketoconazole, mirtazapine, phenytoin, rifampin, ritonavir, tramadol, trazodone ¨ Diuretic (water pill) ¨ Medicine to treat depression or mental health problems ¨ Medicine to treat migraine headaches ¨ Phenothiazine medicine · Tell your doctor if you use anything else that makes you sleepy. Some examples are allergy medicine, narcotic pain medicine, and alcohol. Tell your doctor if you are using buprenorphine, butorphanol, nalbuphine, pentazocine, or a muscle relaxer. · Do not drink alcohol while you are using this medicine. Acetaminophen can damage your liver, and your risk is higher if you also drink alcohol. Warnings While Using This Medicine: · Tell your doctor if you are pregnant or breastfeeding, or if you have kidney disease, liver disease, lung or breathing problems, gallbladder or pancreas problems, an underactive thyroid, Fannin disease, prostate problems, trouble urinating, stomach problems, or a history of head injury or brain tumor, seizures, alcohol or drug addiction. · This medicine may cause the following problems:  
¨ High risk of overdose, which can lead to death ¨ Respiratory depression (serious breathing problem that can be life-threatening) ¨ Liver problems ¨ Serious skin reactions ¨ Serotonin syndrome (when used with certain medicines) · This medicine can be habit-forming. Do not use more than your prescribed dose. Call your doctor if you think your medicine is not working. · This medicine may make you dizzy or drowsy. Do not drive or doing anything else that could be dangerous until you know how this medicine affects you. · This medicine contains acetaminophen. Read the labels of all other medicines you are using to see if they also contain acetaminophen, or ask your doctor or pharmacist. Joan Weeks not use more than 4 grams (4,000 milligrams) total of acetaminophen in one day. · Tell any doctor or dentist who treats you that you are using this medicine. This medicine may affect certain medical test results. · This medicine may cause constipation, especially with long-term use. Ask your doctor if you should use a laxative to prevent and treat constipation. · This medicine could cause infertility. Talk with your doctor before using this medicine if you plan to have children. · Keep all medicine out of the reach of children. Never share your medicine with anyone. Possible Side Effects While Using This Medicine:  
Call your doctor right away if you notice any of these side effects: · Allergic reaction: Itching or hives, swelling in your face or hands, swelling or tingling in your mouth or throat, chest tightness, trouble breathing · Anxiety, restlessness, fast heartbeat, fever, sweating, muscle spasms, twitching, diarrhea, seeing or hearing things that are not there · Blistering, peeling, red skin rash · Blue lips, fingernails, or skin · Dark urine or pale stools, loss of appetite, nausea or vomiting, stomach pain, yellow skin or eyes · Extreme weakness, shallow breathing, slow heartbeat, sweating, seizures, cold or clammy skin · Lightheadedness, dizziness, fainting If you notice these less serious side effects, talk with your doctor: · Constipation, nausea, vomiting · Tiredness or sleepiness If you notice other side effects that you think are caused by this medicine, tell your doctor. Call your doctor for medical advice about side effects. You may report side effects to FDA at 6-447-FDA-5765 © 2017 [facility] Information is for End User's use only and may not be sold, redistributed or otherwise used for commercial purposes. The above information is an  only. It is not intended as medical advice for individual conditions or treatments. Talk to your doctor, nurse or pharmacist before following any medical regimen to see if it is safe and effective for you. Please provide this summary of care documentation to your next provider. Signatures-by signing, you are acknowledging that this After Visit Summary has been reviewed with you and you have received a copy. Patient Signature:  ____________________________________________________________ Date:  ____________________________________________________________  
  
Kathya Stevenson Provider Signature:  ____________________________________________________________ Date:  ____________________________________________________________

## 2018-01-18 NOTE — PERIOP NOTES
Emily Wilsonhew   1960  [id number]    Situation:  Verbal report given from: DUGLAS Carrero CRNA  Procedure: Procedure(s):  PARTIAL FASCIOTOMY RIGHT PALM/LITTLE FINGER    Background:    Preoperative diagnosis: BILATERAL DUPUYTRENS CONTRACTURES    Postoperative diagnosis: BILATERAL DUPUYTRENS CONTRACTURES    :  Dr. Ermelinda Cervantes    Assistant(s): Circ-1: Janay Pantoja  Circ-Relief: Rex Peraza RN  Scrub Tech-1: Anand Banner  Scrub Tech-Relief: RT Duyen    Specimens:   ID Type Source Tests Collected by Time Destination   1 : Dupuytren's tissue - right hand Preservative Hand, Right  Tonnie Rinne, MD 1/18/2018 1118 Pathology       Assessment:  Intra-procedure medications         Anesthesia gave intra-procedure sedation and medications, see anesthesia flow sheet     Intravenous fluids: LR@ KVO     Vital signs stable       Recommendation:    Permission to share finding with family or friend yes

## 2018-01-18 NOTE — BRIEF OP NOTE
BRIEF OPERATIVE NOTE    Date of Procedure: 1/18/2018   Preoperative Diagnosis: BILATERAL DUPUYTRENS CONTRACTURES  Postoperative Diagnosis: BILATERAL DUPUYTRENS CONTRACTURES    Procedure(s):  PARTIAL FASCIECTOMY RIGHT PALM  Surgeon(s) and Role:     * Richard Burr MD - Primary         Assistant Staff:       Surgical Staff:  Circ-1: Nereyda Cooley  Circ-Relief: Beatrice Law RN  Scrub Tech-1: Mica Wan  Scrub Tech-Relief: RT Jose G  Event Time In   Incision Start 1001   Incision Close 1152     Anesthesia: MAC   Estimated Blood Loss: min  Specimens:   ID Type Source Tests Collected by Time Destination   1 : Dupuytren's tissue - right hand Preservative Hand, Right  Richard Burr MD 1/18/2018 1118 Pathology      Findings: Dups   Complications: None  Implants: * No implants in log *

## 2018-01-18 NOTE — ANESTHESIA PREPROCEDURE EVALUATION
Anesthetic History   No history of anesthetic complications            Review of Systems / Medical History  Patient summary reviewed, nursing notes reviewed and pertinent labs reviewed    Pulmonary        Sleep apnea: CPAP  Smoker (1/4 ppd)         Neuro/Psych   Within defined limits           Cardiovascular              Past MI (1996), CAD and cardiac stents (1996)    Exercise tolerance: >4 METS  Comments: Normal Stress ECHO 2015   GI/Hepatic/Renal     GERD: well controlled           Endo/Other    Diabetes: type 2    Morbid obesity     Other Findings              Physical Exam    Airway  Mallampati: II  TM Distance: > 6 cm  Neck ROM: normal range of motion   Mouth opening: Normal     Cardiovascular    Rhythm: regular  Rate: normal         Dental    Dentition: Upper partial plate     Pulmonary  Breath sounds clear to auscultation               Abdominal  GI exam deferred       Other Findings            Anesthetic Plan    ASA: 3  Anesthesia type: MAC and general - backup          Induction: Intravenous  Anesthetic plan and risks discussed with: Patient      preop glucose 185.   Took BB at 5 am

## 2018-01-18 NOTE — PERIOP NOTES
1201 Pt arrived to PACU. Oral airway in. Breath sounds clear. Pt not arrousable. 1210 Pt waking up. Oral airway removed. Pt denies pain. 1225 Pt o2 sats drop to low 90s when sleeping. Practiced using incentive spirometer. Family brought to bedside. 1250 Pt maintaining o2 sats. Pt denies pain. Right fingers warm and pink. Sent pt home with incentive spirometer and instructed him to use it 10 times every hour while away. Pt meets discharge criteria.

## 2018-01-18 NOTE — OP NOTES
[facility]  ACUTE CARE OP NOTE    Name:KEYUR Cam  MR#: [id number]  : 1960  ACCOUNT #: [de-identified]   DATE OF SERVICE: 2018    PREOPERATIVE DIAGNOSIS:  Bilateral Dupuytren contractures of the palm with large palmar nodules. POSTOPERATIVE DIAGNOSIS:  Bilateral Dupuytren contractures of the palm with large palmar nodules. PROCEDURE PERFORMED:  Right partial palmar fasciectomy. SURGEON:  Aydin Smith MD.     ANESTHESIA:  General, supplemented with local.    ESTIMATED BLOOD LOSS:  Minimal.    SPECIMENS REMOVED:  Dupuytren tissue from the right hand. COMPLICATIONS:  None. IMPLANTS:  None. SUMMARY:  The patient was brought into the operating room, placed on the operating table in supine position and sedation administered. Note that the operative site had been identified and appropriate preoperative antibiotic prophylaxis was administered in preop holding. The right upper extremity was prepped and draped in the usual manner. After timeout, approximately 27 mL of 2% lidocaine with epinephrine were injected around the palm in order to block the entire palm and give us some hemostatic control. However, during the case, it became clear that the patient was not staying still, so he was put under general anesthetic. A large ulnarly based triangular flap of skin was raised in the palm. Palmar fascia was identified proximally and then dissected free from surrounding tissues distally and removed. This was actually done in 2 separate pieces, both of which were sent for specimen. The palmar fascia was invested in the skin, and in some places I removed partial thickness skin by using a 15 blade to essentially split the skin in half, removing the diseased deeper portion and leaving the more superficial portion intact. A small hole was made in the skin.     At the end of the procedure, all pathologic tissue has been removed from the ulnar half of the palm, as best possible. The wound was thoroughly irrigated. Closure was performed with 4-0 nylon suture. Xeroform, 4 x 4's, Kerlix and Coban were used as dressing. The tourniquet was released. Total tourniquet time was 105 minutes. The patient tolerated the procedure well, was taken back to the PACU in stable condition.       MD RANDELL Woodard / Main Rossi  D: 01/18/2018 12:07     T: 01/18/2018 12:33  JOB #: [id number]

## 2018-02-19 RX ORDER — TADALAFIL 5 MG/1
TABLET, FILM COATED ORAL
Qty: 30 TAB | Refills: 3 | Status: ON HOLD | OUTPATIENT
Start: 2018-02-19 | End: 2018-10-11

## 2018-03-23 RX ORDER — LOSARTAN POTASSIUM 50 MG/1
50 TABLET ORAL DAILY
Qty: 90 TAB | Refills: 3 | Status: SHIPPED | OUTPATIENT
Start: 2018-03-23 | End: 2018-10-12

## 2018-03-23 RX ORDER — METFORMIN HYDROCHLORIDE 500 MG/1
500 TABLET ORAL 2 TIMES DAILY WITH MEALS
Qty: 360 TAB | Refills: 3 | Status: ON HOLD | OUTPATIENT
Start: 2018-03-23 | End: 2018-10-11

## 2018-03-23 RX ORDER — OMEPRAZOLE 20 MG/1
20 CAPSULE, DELAYED RELEASE ORAL DAILY
Qty: 90 CAP | Refills: 3 | Status: SHIPPED | OUTPATIENT
Start: 2018-03-23 | End: 2019-04-18 | Stop reason: SDUPTHER

## 2018-03-23 RX ORDER — ALLOPURINOL 300 MG/1
TABLET ORAL
Qty: 90 TAB | Refills: 3 | Status: SHIPPED | OUTPATIENT
Start: 2018-03-23 | End: 2019-04-18 | Stop reason: SDUPTHER

## 2018-03-23 NOTE — TELEPHONE ENCOUNTER
PCP: Kendra Zafar MD    Last appt: 9/26/2017  Future Appointments  Date Time Provider Mary Mccallum   3/26/2018 8:45 AM Kendra Zafar MD [facility] 87       Requested Prescriptions     Pending Prescriptions Disp Refills    omeprazole (PRILOSEC) 20 mg capsule 90 Cap 3     Sig: Take 1 Cap by mouth daily.  metFORMIN (GLUCOPHAGE) 500 mg tablet 360 Tab 3     Sig: Take 1 Tab by mouth two (2) times daily (with meals).  losartan (COZAAR) 50 mg tablet 90 Tab 3     Sig: Take 1 Tab by mouth daily.     allopurinol (ZYLOPRIM) 300 mg tablet 90 Tab 3     Sig: TAKE 1 TABLET BY MOUTH EVERY DAY

## 2018-03-24 NOTE — PROGRESS NOTES
HISTORY OF PRESENT ILLNESS  Kindra Fulton Sr is a 65 y.o. male. HPI   F/u dm-2 htn hld CAD  Had b/l hand surgery-dupuytrens contractures since last visit  Last a1c 7.0 ldl 55  a1c today 8.3  fsbs in morning 180-200  Has not been strict with diet  sa dr Danitza Dawkins last month-new contact , no retinopathy per pt    Last visit  No fsbs readings  Had recent eye exam with Dr Danitza Dawkins per pt  On low carb diet , small portions  Lots of walkinmg at work and at home  No gout attacks this year   Needs cialis refill  Has been very good about taking meds since last visit and on a good diet  No cp sxs-sees Dr. Veronika Clark in f/u soon  Less neuropathy sxs in feet, less tingling now  Still has not had his repeat colonoscopy -Dr Renee Portillo but plans on scheduling this study    Patient Active Problem List    Diagnosis Date Noted    PAU on CPAP 02/01/2016    DM type 2 (diabetes mellitus, type 2) ([facility] [address] 10/19/2013    Obesity 03/08/2011    CAD (Coronary Artery Disease) - stent 1999 10/20/2009    HTN (hypertension), benign 10/20/2009    Gout 10/20/2009    Hypercholesteremia 10/20/2009    Osteoarthritis of knee 10/20/2009     Current Outpatient Prescriptions   Medication Sig Dispense Refill    omeprazole (PRILOSEC) 20 mg capsule Take 1 Cap by mouth daily. 90 Cap 3    metFORMIN (GLUCOPHAGE) 500 mg tablet Take 1 Tab by mouth two (2) times daily (with meals). 360 Tab 3    losartan (COZAAR) 50 mg tablet Take 1 Tab by mouth daily. 90 Tab 3    allopurinol (ZYLOPRIM) 300 mg tablet TAKE 1 TABLET BY MOUTH EVERY DAY 90 Tab 3    CIALIS 5 mg tablet TAKE 5 MG BY MOUTH AS NEEDED. 30 Tab 3    HYDROcodone-acetaminophen (NORCO) 5-325 mg per tablet Take 1 Tab by mouth every four (4) hours as needed for Pain. Max Daily Amount: 6 Tabs.  30 Tab 0    indomethacin (INDOCIN) 50 mg capsule TAKE ONE CAPSULE BY MOUTH 3 TIMES A DAY AS NEEDED 30 Cap 1    bumetanide (BUMEX) 1 mg tablet TAKE 1 TABLET EVERY DAY 90 Tab 2    multivitamin (ONE A DAY) tablet Take 1 Tab by mouth daily.  amLODIPine (NORVASC) 5 mg tablet Take 5 mg by mouth daily. 12    omega-3 acid ethyl esters (LOVAZA) 1 gram capsule Take 2 capsules by mouth two (2) times daily (with meals). 360 capsule 3    atenolol (TENORMIN) 100 mg tablet Take 1 tablet by mouth daily. 90 tablet 3    atorvastatin (LIPITOR) 80 mg tablet Take 80 mg by mouth daily.  aspirin 81 mg Tab Take 81 mg by mouth daily. No Known Allergies  Social History   Substance Use Topics    Smoking status: Light Tobacco Smoker     Packs/day: 0.25     Types: Cigarettes    Smokeless tobacco: Never Used    Alcohol use 6.0 oz/week     12 Cans of beer per week      Lab Results  Component Value Date/Time   Hemoglobin A1c 7.0 (H) 09/26/2017 10:11 AM   Hemoglobin A1c 7.9 (H) 02/01/2016 09:46 AM   Hemoglobin A1c 7.2 (H) 08/03/2015 10:26 AM   Glucose 179 (H) 01/16/2018 09:22 AM   Glucose (POC) 185 (H) 01/18/2018 08:03 AM   Microalb/Creat ratio (ug/mg creat.) 3.7 01/16/2017 10:54 AM   LDL, calculated 55 09/26/2017 10:11 AM   Creatinine 0.94 01/16/2018 09:22 AM      Lab Results  Component Value Date/Time   Cholesterol, total 157 09/26/2017 10:11 AM   Cholesterol (POC) 163 10/04/2011 08:23 AM   HDL Cholesterol 39 (L) 09/26/2017 10:11 AM   LDL, calculated 55 09/26/2017 10:11 AM   LDL Cholesterol (POC) N/A 10/04/2011 08:23 AM   Triglyceride 317 (H) 09/26/2017 10:11 AM   Triglycerides (POC) 596 10/04/2011 08:23 AM     Lab Results  Component Value Date/Time   GFR est non-AA >60 01/16/2018 09:22 AM   GFR est AA >60 01/16/2018 09:22 AM   Creatinine 0.94 01/16/2018 09:22 AM   BUN 13 01/16/2018 09:22 AM   Sodium 137 01/16/2018 09:22 AM   Potassium 4.3 01/16/2018 09:22 AM   Chloride 104 01/16/2018 09:22 AM   CO2 27 01/16/2018 09:22 AM     Lab Results   Component Value Date/Time    Glucose 179 (H) 01/16/2018 09:22 AM    Glucose (POC) 185 (H) 01/18/2018 08:03 AM         ROS    Physical Exam   Constitutional: He appears well-developed and well-nourished. No distress. Appears stated age   HENT:   Head: Normocephalic. Cardiovascular: Normal rate, regular rhythm and normal heart sounds. Exam reveals no gallop and no friction rub. No murmur heard. Pulmonary/Chest: Effort normal and breath sounds normal.   Abdominal: Soft. Musculoskeletal: He exhibits no edema. Neurological: He is alert. Psychiatric: He has a normal mood and affect. Nursing note and vitals reviewed. ASSESSMENT and PLAN  Diagnoses and all orders for this visit:    1. Type 2 diabetes mellitus without complication, without long-term current use of insulin (HCC)  -     AMB POC HEMOGLOBIN A1C 8.3  -     MICROALBUMIN, UR, RAND W/ MICROALB/CREAT RATIO   Add januvia 100mg every day   Needs to restrict calories-discussed    2. HTN (hypertension), benign   Reasonable control  3. Hypercholesteremia  -     LIPID PANEL  -     LIPID PANEL   Continue statin   4. Prostate cancer screening  -     PSA SCREENING (SCREENING) ()  -     PSA W/ REFLX FREE PSA    5. Colon cancer screening  -     OCCULT BLOOD, IMMUNOASSAY (FIT)  -     OCCULT BLOOD, IMMUNOASSAY (FIT)    Other orders  -     SITagliptin (JANUVIA) 100 mg tablet; Take 1 Tab by mouth daily. Follow-up Disposition:  Return in about 4 months (around 7/26/2018) for dm2 htn hld.

## 2018-03-26 ENCOUNTER — OFFICE VISIT (OUTPATIENT)
Dept: INTERNAL MEDICINE CLINIC | Age: 58
End: 2018-03-26

## 2018-03-26 VITALS
OXYGEN SATURATION: 98 % | DIASTOLIC BLOOD PRESSURE: 81 MMHG | HEART RATE: 58 BPM | WEIGHT: 273 LBS | SYSTOLIC BLOOD PRESSURE: 146 MMHG | TEMPERATURE: 98 F | HEIGHT: 73 IN | BODY MASS INDEX: 36.18 KG/M2

## 2018-03-26 DIAGNOSIS — E11.9 TYPE 2 DIABETES MELLITUS WITHOUT COMPLICATION, WITHOUT LONG-TERM CURRENT USE OF INSULIN (HCC): Primary | ICD-10-CM

## 2018-03-26 DIAGNOSIS — E78.00 HYPERCHOLESTEREMIA: ICD-10-CM

## 2018-03-26 DIAGNOSIS — Z12.11 COLON CANCER SCREENING: ICD-10-CM

## 2018-03-26 DIAGNOSIS — Z12.5 PROSTATE CANCER SCREENING: ICD-10-CM

## 2018-03-26 DIAGNOSIS — I10 HTN (HYPERTENSION), BENIGN: ICD-10-CM

## 2018-03-26 LAB — HBA1C MFR BLD HPLC: 8.3 % (ref 4.8–5.6)

## 2018-03-26 NOTE — PROGRESS NOTES
Chief Complaint   Patient presents with    Diabetes     6 month follow up POC A1C is 8.3% on today's visit    Hypertension     6 month follow up    Labs     Fasting       Health Maintenance   Topic Date Due    Pneumococcal 19-64 Medium Risk (1 of 1 - PPSV23) 08/09/1980    COLONOSCOPY  02/04/2014    FOOT EXAM Q1  02/01/2017    MICROALBUMIN Q1  01/16/2018    EYE EXAM RETINAL OR DILATED Q1  03/02/2018    HEMOGLOBIN A1C Q6M  09/26/2018    LIPID PANEL Q1  09/26/2018    DTaP/Tdap/Td series (2 - Td) 10/17/2023    Hepatitis C Screening  Completed    Influenza Age 5 to Adult  Addressed     Health Maintenance Review

## 2018-03-26 NOTE — MR AVS SNAPSHOT
[address] [facility] 
[phone] Patient: Armando Pryor 
MRN: [id number] Sonal Wakefield Visit Information Date & Time Provider Department Dept. Phone Encounter #  
 3/26/2018  8:45 AM Michial Needs, [address] [place] [address],[address] [phone] [id number] Follow-up Instructions Return in about 4 months (around 7/26/2018) for dm2 htn hld. Upcoming Health Maintenance Date Due Pneumococcal 19-64 Medium Risk (1 of 1 - PPSV23) 8/9/1980 COLONOSCOPY 2/4/2014 FOOT EXAM Q1 2/1/2017 MICROALBUMIN Q1 1/16/2018 EYE EXAM RETINAL OR DILATED Q1 3/2/2018 HEMOGLOBIN A1C Q6M 9/26/2018 LIPID PANEL Q1 9/26/2018 DTaP/Tdap/Td series (2 - Td) 10/17/2023 Allergies as of 3/26/2018  Review Complete On: 3/26/2018 By: Irwin Rivera LPN No Known Allergies Current Immunizations  Never Reviewed Name Date Influenza Vaccine (Quad) PF 9/26/2017, 1/16/2017 Tdap 10/17/2013 Not reviewed this visit You Were Diagnosed With   
  
 Codes Comments Type 2 diabetes mellitus without complication, without long-term current use of insulin (HCC)    -  Primary ICD-10-CM: E11.9 ICD-9-CM: 250.00 HTN (hypertension), benign     ICD-10-CM: I10 
ICD-9-CM: 401.1 Hypercholesteremia     ICD-10-CM: E78.00 ICD-9-CM: 272.0 Prostate cancer screening     ICD-10-CM: Z12.5 ICD-9-CM: V76.44 Colon cancer screening     ICD-10-CM: Z12.11 ICD-9-CM: V76.51 Vitals BP Pulse Temp Height(growth percentile) Weight(growth percentile) SpO2  
 146/81 (BP 1 Location: Left arm, BP Patient Position: Sitting) (!) 58 98 °F (36.7 °C) (Oral) 6' 1\" (1.854 m) 273 lb (123.8 kg) 98% BMI Smoking Status 36.02 kg/m2 Light Tobacco Smoker BMI and BSA Data Body Mass Index Body Surface Area 36.02 kg/m 2 2.53 m 2 Preferred Pharmacy Pharmacy Name Phone [facility]/PHARMACY #[id number]- [place], [place] - [address] [phone] Your Updated Medication List  
  
   
This list is accurate as of 3/26/18  9:18 AM.  Always use your most recent med list.  
  
  
  
  
 allopurinol 300 mg tablet Commonly known as:  ZYLOPRIM  
TAKE 1 TABLET BY MOUTH EVERY DAY  
  
 amLODIPine 5 mg tablet Commonly known as:  Bosie Shield Take 5 mg by mouth daily. aspirin 81 mg tablet Take 81 mg by mouth daily. atenolol 100 mg tablet Commonly known as:  TENORMIN Take 1 tablet by mouth daily. bumetanide 1 mg tablet Commonly known as:  Lowella Nordland TAKE 1 TABLET EVERY DAY  
  
 CIALIS 5 mg tablet Generic drug:  tadalafil TAKE 5 MG BY MOUTH AS NEEDED. HYDROcodone-acetaminophen 5-325 mg per tablet Commonly known as:  Mark Silvius Take 1 Tab by mouth every four (4) hours as needed for Pain. Max Daily Amount: 6 Tabs. indomethacin 50 mg capsule Commonly known as:  INDOCIN  
TAKE ONE CAPSULE BY MOUTH 3 TIMES A DAY AS NEEDED  
  
 LIPITOR 80 mg tablet Generic drug:  atorvastatin Take 80 mg by mouth daily. losartan 50 mg tablet Commonly known as:  COZAAR Take 1 Tab by mouth daily. metFORMIN 500 mg tablet Commonly known as:  GLUCOPHAGE Take 1 Tab by mouth two (2) times daily (with meals). multivitamin tablet Commonly known as:  ONE A DAY Take 1 Tab by mouth daily. omega-3 acid ethyl esters 1 gram capsule Commonly known as:  Merribeth Hacker Take 2 capsules by mouth two (2) times daily (with meals). omeprazole 20 mg capsule Commonly known as:  PRILOSEC Take 1 Cap by mouth daily. SITagliptin 100 mg tablet Commonly known as:  Marcheta Bogaert Take 1 Tab by mouth daily. Prescriptions Sent to Pharmacy Refills SITagliptin (JANUVIA) 100 mg tablet 3 Sig: Take 1 Tab by mouth daily. Class: Normal  
 Pharmacy: [facility] [address] [facility] [address] #: [phone] Route: Oral  
  
We Performed the Following AMB POC HEMOGLOBIN A1C [41134 CPT(R)] LIPID PANEL [72920 CPT(R)] LIPID PANEL [23744 CPT(R)] MICROALBUMIN, UR, RAND W/ MICROALB/CREAT RATIO [id number] CPT(R)] OCCULT BLOOD, IMMUNOASSAY (FIT) [id number] CPT(R)] OCCULT BLOOD, IMMUNOASSAY (FIT) [id number] CPT(R)] PSA SCREENING (SCREENING) [ [facility]] PSA W/ REFLX FREE PSA [85799 CPT(R)] Follow-up Instructions Return in about 4 months (around 7/26/2018) for dm2 htn hld. Introducing [facility] & HEALTH SERVICES! Dear Angelina Kenyon: Thank you for requesting a [facility] account. Our records indicate that you already have an active [facility] account. You can access your account anytime at [email or web address] Did you know that you can access your hospital and ER discharge instructions at any time in [facility]? You can also review all of your test results from your hospital stay or ER visit. Additional Information If you have questions, please visit the Frequently Asked Questions section of the [facility] website at [email or web address] Remember, [facility] is NOT to be used for urgent needs. For medical emergencies, dial 911. Now available from your iPhone and Android! Please provide this summary of care documentation to your next provider. Your primary care clinician is listed as Lisa WILLAMS. If you have any questions after today's visit, please call [phone].

## 2018-03-26 NOTE — LETTER
3/26/2018 9:02 AM 
 
Mr. Italo Holguin Sr 
[address] [address] 
 
3/26/2018 Dear Ishmael Vega Professional: In our effort to provide the highest quality care to our patients with diabetes, it would be beneficial to have the results of their recent dilated eye exam.  Please fill out the information below from the dilated eye exam and fax it back to [phone] Patient Name:     Lucille Dailey 
 
YOB: 1960 Date of Eye Exam:____________________________ Findings (please check the appropriate selection(s) below): Retinal Findings: 
 
? No retinopathy 
 
? Non-proliferative diabetic retinopathy ? Pre-proliferative diabetic retinopathy ? Proliferative diabetic retinopathy 
 
? Other: 
  
Please also fax a copy the [visit/examination/consult] note from the eye exam 
 
Recommendations/Comments:____________________________________________________________________________________________________________________________________________________________________________________________________________________________________________________________________________________________________________________________________ Name of Laveta Silvia Professional (print):_____________________________________ Signature:_____________________________________________________________                                                                                                                                             Thank you very much 
MD Aguilar Mccormick, 
 
 
Jese Gleason MD

## 2018-03-27 LAB
ALBUMIN/CREAT UR: 5.2 MG/G CREAT (ref 0–30)
CHOLEST SERPL-MCNC: 152 MG/DL (ref 100–199)
CREAT UR-MCNC: 166.3 MG/DL
HDLC SERPL-MCNC: 35 MG/DL
LDLC SERPL CALC-MCNC: 53 MG/DL (ref 0–99)
MICROALBUMIN UR-MCNC: 8.6 UG/ML
PSA SERPL-MCNC: 1.3 NG/ML (ref 0–4)
REFLEX CRITERIA: NORMAL
TRIGL SERPL-MCNC: 318 MG/DL (ref 0–149)
VLDLC SERPL CALC-MCNC: 64 MG/DL (ref 5–40)

## 2018-04-06 RX ORDER — INDOMETHACIN 50 MG/1
CAPSULE ORAL
Qty: 30 CAP | Refills: 1 | Status: SHIPPED | OUTPATIENT
Start: 2018-04-06 | End: 2018-10-02

## 2018-07-26 ENCOUNTER — OFFICE VISIT (OUTPATIENT)
Dept: INTERNAL MEDICINE CLINIC | Age: 58
End: 2018-07-26

## 2018-07-26 VITALS
SYSTOLIC BLOOD PRESSURE: 154 MMHG | HEIGHT: 73 IN | OXYGEN SATURATION: 96 % | HEART RATE: 79 BPM | WEIGHT: 274 LBS | BODY MASS INDEX: 36.31 KG/M2 | DIASTOLIC BLOOD PRESSURE: 80 MMHG | TEMPERATURE: 98 F

## 2018-07-26 DIAGNOSIS — H66.91 RIGHT OTITIS MEDIA, UNSPECIFIED OTITIS MEDIA TYPE: ICD-10-CM

## 2018-07-26 DIAGNOSIS — E11.9 TYPE 2 DIABETES MELLITUS WITHOUT COMPLICATION, WITHOUT LONG-TERM CURRENT USE OF INSULIN (HCC): Primary | ICD-10-CM

## 2018-07-26 DIAGNOSIS — I10 HTN (HYPERTENSION), BENIGN: ICD-10-CM

## 2018-07-26 DIAGNOSIS — Z12.11 COLON CANCER SCREENING: ICD-10-CM

## 2018-07-26 DIAGNOSIS — E78.00 HYPERCHOLESTEREMIA: ICD-10-CM

## 2018-07-26 LAB — HBA1C MFR BLD HPLC: 6.9 % (ref 4.8–5.6)

## 2018-07-26 RX ORDER — AMOXICILLIN AND CLAVULANATE POTASSIUM 875; 125 MG/1; MG/1
1 TABLET, FILM COATED ORAL EVERY 12 HOURS
Qty: 14 TAB | Refills: 0 | Status: ON HOLD | OUTPATIENT
Start: 2018-07-26 | End: 2018-10-11

## 2018-07-26 NOTE — PROGRESS NOTES
Chief Complaint   Patient presents with    Diabetes     4 month  poc a1c 6.9%    Labs     Non fasting    Ear Fullness     Both ,ears popping sound ,sinus drainage

## 2018-07-26 NOTE — MR AVS SNAPSHOT
[address] [place] [address] 
[phone] Patient: Austin Navarro 
MRN: [id number] Leartis Needs Visit Information Date & Time Provider Department Dept. Phone Encounter #  
 7/26/2018  8:30 AM Pedro Albert, [address] [phone] [id number] Follow-up Instructions Return in about 4 months (around 11/26/2018) for dm02 htn hld cad. Upcoming Health Maintenance Date Due Pneumococcal 19-64 Medium Risk (1 of 1 - PPSV23) 8/9/1980 COLONOSCOPY 2/4/2014 FOOT EXAM Q1 2/1/2017 EYE EXAM RETINAL OR DILATED Q1 3/2/2018 Influenza Age 5 to Adult 8/1/2018 HEMOGLOBIN A1C Q6M 9/26/2018 MICROALBUMIN Q1 3/26/2019 LIPID PANEL Q1 3/26/2019 DTaP/Tdap/Td series (2 - Td) 10/17/2023 Allergies as of 7/26/2018  Review Complete On: 7/26/2018 By: Linh Lorenzo LPN No Known Allergies Current Immunizations  Never Reviewed Name Date Influenza Vaccine (Quad) PF 9/26/2017, 1/16/2017 Tdap 10/17/2013 Not reviewed this visit You Were Diagnosed With   
  
 Codes Comments Type 2 diabetes mellitus without complication, without long-term current use of insulin (HCC)    -  Primary ICD-10-CM: E11.9 ICD-9-CM: 250.00 HTN (hypertension), benign     ICD-10-CM: I10 
ICD-9-CM: 401.1 Hypercholesteremia     ICD-10-CM: E78.00 ICD-9-CM: 272.0 Colon cancer screening     ICD-10-CM: Z12.11 ICD-9-CM: V76.51 Right otitis media, unspecified otitis media type     ICD-10-CM: H66.91 
ICD-9-CM: 382. 9 Vitals BP Pulse Temp Height(growth percentile) Weight(growth percentile) SpO2  
 154/80 (BP 1 Location: Left arm, BP Patient Position: Sitting) 79 98 °F (36.7 °C) (Oral) 6' 1\" (1.854 m) 274 lb (124.3 kg) 96% BMI Smoking Status 36.15 kg/m2 Light Tobacco Smoker BMI and BSA Data  Body Mass Index Body Surface Area  
 36.15 kg/m 2 2.53 m 2  
  
 Preferred Pharmacy Pharmacy Name Phone [facility]/PHARMACY #[id number]- [place], [place] - [address] [phone] Your Updated Medication List  
  
   
This list is accurate as of 7/26/18  8:42 AM.  Always use your most recent med list.  
  
  
  
  
 allopurinol 300 mg tablet Commonly known as:  ZYLOPRIM  
TAKE 1 TABLET BY MOUTH EVERY DAY  
  
 amLODIPine 5 mg tablet Commonly known as:  Horris Bills Take 5 mg by mouth daily. amoxicillin-clavulanate 875-125 mg per tablet Commonly known as:  AUGMENTIN Take 1 Tab by mouth every twelve (12) hours. aspirin 81 mg tablet Take 81 mg by mouth daily. atenolol 100 mg tablet Commonly known as:  TENORMIN Take 1 tablet by mouth daily. bumetanide 1 mg tablet Commonly known as:  Maryam Milan TAKE 1 TABLET EVERY DAY  
  
 CIALIS 5 mg tablet Generic drug:  tadalafil TAKE 5 MG BY MOUTH AS NEEDED.  
  
 indomethacin 50 mg capsule Commonly known as:  INDOCIN  
TAKE ONE CAPSULE BY MOUTH 3 TIMES A DAY AS NEEDED  
  
 LIPITOR 80 mg tablet Generic drug:  atorvastatin Take 80 mg by mouth daily. losartan 50 mg tablet Commonly known as:  COZAAR Take 1 Tab by mouth daily. metFORMIN 500 mg tablet Commonly known as:  GLUCOPHAGE Take 1 Tab by mouth two (2) times daily (with meals). multivitamin tablet Commonly known as:  ONE A DAY Take 1 Tab by mouth daily. omega-3 acid ethyl esters 1 gram capsule Commonly known as:  [place] Kathy Take 2 capsules by mouth two (2) times daily (with meals). omeprazole 20 mg capsule Commonly known as:  PRILOSEC Take 1 Cap by mouth daily. sAXagliptin 5 mg Tab tablet Commonly known as:  ONGLYZA Take 1 Tab by mouth daily. Prescriptions Sent to Pharmacy Refills  
 amoxicillin-clavulanate (AUGMENTIN) 875-125 mg per tablet 0 Sig: Take 1 Tab by mouth every twelve (12) hours.   
 Class: Normal  
 Pharmacy: [address] Ph #: [phone] Route: Oral  
  
We Performed the Following AMB POC HEMOGLOBIN A1C [03990 CPT(R)] CBC W/O DIFF [52662 CPT(R)] METABOLIC PANEL, COMPREHENSIVE [17109 CPT(R)] REFERRAL TO GASTROENTEROLOGY [FUE20 Custom] Follow-up Instructions Return in about 4 months (around 11/26/2018) for dm02 htn hld cad. Referral Information Referral ID Referred By Referred To  
  
 [id number] [facility], [address]   
   [address] [address] [place], [address] Visits Status Start Date End Date 1 New Request 7/25/18 7/25/19 If your referral has a status of pending review or denied, additional information will be sent to support the outcome of this decision. Introducing [facility]! Dear Andres Hamilton: Thank you for requesting a [facility] account. Our records indicate that you already have an active [facility] account. You can access your account anytime at [email or web address] Did you know that you can access your hospital and ER discharge instructions at any time in [facility]? You can also review all of your test results from your hospital stay or ER visit. Additional Information If you have questions, please visit the Frequently Asked Questions section of the [facility] website at [email or web address] Remember, [facility] is NOT to be used for urgent needs. For medical emergencies, dial 911. Now available from your iPhone and Android! Please provide this summary of care documentation to your next provider. Your primary care clinician is listed as Jacinto WILLAMS. If you have any questions after today's visit, please call [phone].

## 2018-07-26 NOTE — PROGRESS NOTES
HISTORY OF PRESENT ILLNESS  Terri Dodge Sr is a 65 y.o. male. HPI     F/u dm-2 htn hld CAD  Last a1c 8.3 LDL 53  a1c down to 6.9 today  januvia was added last visit  fsbs 140  No cp sob    C/o right ear pain x 2 weeks with popping and drainage    Last OV  Had b/l hand surgery-dupuytrens contractures since last visit  Last a1c 7.0 ldl 55  a1c today 8.3  fsbs in morning 180-200  Has not been strict with diet  sa dr Cassandra Montalvo last month-new contact , no retinopathy per pt    Patient Active Problem List    Diagnosis Date Noted    PAU on CPAP 02/01/2016    DM type 2 (diabetes mellitus, type 2) ([facility] [address] 10/19/2013    Obesity 03/08/2011    CAD (Coronary Artery Disease) - stent 1999 10/20/2009    HTN (hypertension), benign 10/20/2009    Gout 10/20/2009    Hypercholesteremia 10/20/2009    Osteoarthritis of knee 10/20/2009     Current Outpatient Prescriptions   Medication Sig Dispense Refill    indomethacin (INDOCIN) 50 mg capsule TAKE ONE CAPSULE BY MOUTH 3 TIMES A DAY AS NEEDED 30 Cap 1    sAXagliptin (ONGLYZA) 5 mg tab tablet Take 1 Tab by mouth daily. 90 Tab 3    omeprazole (PRILOSEC) 20 mg capsule Take 1 Cap by mouth daily. 90 Cap 3    metFORMIN (GLUCOPHAGE) 500 mg tablet Take 1 Tab by mouth two (2) times daily (with meals). (Patient taking differently: Take 500 mg by mouth two (2) times daily (with meals). 2 tabs Bid) 360 Tab 3    losartan (COZAAR) 50 mg tablet Take 1 Tab by mouth daily. 90 Tab 3    allopurinol (ZYLOPRIM) 300 mg tablet TAKE 1 TABLET BY MOUTH EVERY DAY 90 Tab 3    CIALIS 5 mg tablet TAKE 5 MG BY MOUTH AS NEEDED. 30 Tab 3    HYDROcodone-acetaminophen (NORCO) 5-325 mg per tablet Take 1 Tab by mouth every four (4) hours as needed for Pain. Max Daily Amount: 6 Tabs. 30 Tab 0    bumetanide (BUMEX) 1 mg tablet TAKE 1 TABLET EVERY DAY 90 Tab 2    multivitamin (ONE A DAY) tablet Take 1 Tab by mouth daily.  amLODIPine (NORVASC) 5 mg tablet Take 5 mg by mouth daily.   12    omega-3 acid ethyl esters (LOVAZA) 1 gram capsule Take 2 capsules by mouth two (2) times daily (with meals). 360 capsule 3    atenolol (TENORMIN) 100 mg tablet Take 1 tablet by mouth daily. 90 tablet 3    atorvastatin (LIPITOR) 80 mg tablet Take 80 mg by mouth daily.  aspirin 81 mg Tab Take 81 mg by mouth daily. No Known Allergies   Lab Results  Component Value Date/Time   Hemoglobin A1c 7.0 (H) 09/26/2017 10:11 AM   Hemoglobin A1c 7.9 (H) 02/01/2016 09:46 AM   Hemoglobin A1c 7.2 (H) 08/03/2015 10:26 AM   Glucose 179 (H) 01/16/2018 09:22 AM   Glucose (POC) 185 (H) 01/18/2018 08:03 AM   Microalb/Creat ratio (ug/mg creat.) 5.2 03/26/2018 09:50 AM   LDL, calculated 53 03/26/2018 09:50 AM   Creatinine 0.94 01/16/2018 09:22 AM      Lab Results  Component Value Date/Time   Cholesterol, total 152 03/26/2018 09:50 AM   Cholesterol (POC) 163 10/04/2011 08:23 AM   HDL Cholesterol 35 (L) 03/26/2018 09:50 AM   LDL, calculated 53 03/26/2018 09:50 AM   LDL Cholesterol (POC) N/A 10/04/2011 08:23 AM   Triglyceride 318 (H) 03/26/2018 09:50 AM   Triglycerides (POC) 596 10/04/2011 08:23 AM     Lab Results  Component Value Date/Time   GFR est non-AA >60 01/16/2018 09:22 AM   GFR est AA >60 01/16/2018 09:22 AM   Creatinine 0.94 01/16/2018 09:22 AM   BUN 13 01/16/2018 09:22 AM   Sodium 137 01/16/2018 09:22 AM   Potassium 4.3 01/16/2018 09:22 AM   Chloride 104 01/16/2018 09:22 AM   CO2 27 01/16/2018 09:22 AM        ROS    Physical Exam   Constitutional: He appears well-developed and well-nourished. No distress. Appears stated age   HENT:   Head: Normocephalic. Right TM is red with scarring   Cardiovascular: Normal rate, regular rhythm and normal heart sounds. Exam reveals no gallop and no friction rub. No murmur heard. Pulmonary/Chest: Effort normal and breath sounds normal.   Abdominal: Soft. Musculoskeletal: He exhibits no edema. Neurological: He is alert. Psychiatric: He has a normal mood and affect.    Nursing note and vitals reviewed. ASSESSMENT and PLAN  Diagnoses and all orders for this visit:    1. Type 2 diabetes mellitus without complication, without long-term current use of insulin (HCC)  -     AMB POC HEMOGLOBIN A1C 6.9 controlled  -     METABOLIC PANEL, COMPREHENSIVE  -     CBC W/O DIFF    2. HTN (hypertension), benign  -     METABOLIC PANEL, COMPREHENSIVE  -     CBC W/O DIFF   mikld sbp elevation--low sodium diet, continue medicines    3. Hypercholesteremia  -     METABOLIC PANEL, COMPREHENSIVE   Continue statin  4. Colon cancer screening  -     Precious Jones [facility]    5. Right otitis media, unspecified otitis media type   augmentin rx     See ENT MD of not improved in 1 week-discussed  Other orders  -     amoxicillin-clavulanate (AUGMENTIN) 875-125 mg per tablet; Take 1 Tab by mouth every twelve (12) hours. Follow-up Disposition:  Return in about 4 months (around 11/26/2018) for dm02 htn hld cad.

## 2018-07-27 LAB
ALBUMIN SERPL-MCNC: 4.9 G/DL (ref 3.5–5.5)
ALBUMIN/GLOB SERPL: 2.2 {RATIO} (ref 1.2–2.2)
ALP SERPL-CCNC: 96 IU/L (ref 39–117)
ALT SERPL-CCNC: 30 IU/L (ref 0–44)
AST SERPL-CCNC: 19 IU/L (ref 0–40)
BILIRUB SERPL-MCNC: 0.4 MG/DL (ref 0–1.2)
BUN SERPL-MCNC: 8 MG/DL (ref 6–24)
BUN/CREAT SERPL: 9 (ref 9–20)
CALCIUM SERPL-MCNC: 9.7 MG/DL (ref 8.7–10.2)
CHLORIDE SERPL-SCNC: 100 MMOL/L (ref 96–106)
CO2 SERPL-SCNC: 23 MMOL/L (ref 20–29)
CREAT SERPL-MCNC: 0.9 MG/DL (ref 0.76–1.27)
ERYTHROCYTE [DISTWIDTH] IN BLOOD BY AUTOMATED COUNT: 13.7 % (ref 12.3–15.4)
GLOBULIN SER CALC-MCNC: 2.2 G/DL (ref 1.5–4.5)
GLUCOSE SERPL-MCNC: 181 MG/DL (ref 65–99)
HCT VFR BLD AUTO: 44.9 % (ref 37.5–51)
HGB BLD-MCNC: 14.6 G/DL (ref 13–17.7)
MCH RBC QN AUTO: 28.5 PG (ref 26.6–33)
MCHC RBC AUTO-ENTMCNC: 32.5 G/DL (ref 31.5–35.7)
MCV RBC AUTO: 88 FL (ref 79–97)
PLATELET # BLD AUTO: 204 X10E3/UL (ref 150–379)
POTASSIUM SERPL-SCNC: 4.9 MMOL/L (ref 3.5–5.2)
PROT SERPL-MCNC: 7.1 G/DL (ref 6–8.5)
RBC # BLD AUTO: 5.13 X10E6/UL (ref 4.14–5.8)
SODIUM SERPL-SCNC: 140 MMOL/L (ref 134–144)
WBC # BLD AUTO: 6.7 X10E3/UL (ref 3.4–10.8)

## 2018-08-16 ENCOUNTER — DOCUMENTATION ONLY (OUTPATIENT)
Dept: INTERNAL MEDICINE CLINIC | Age: 58
End: 2018-08-16

## 2018-10-02 ENCOUNTER — DOCUMENTATION ONLY (OUTPATIENT)
Dept: CARDIOTHORACIC SURGERY | Age: 58
End: 2018-10-02

## 2018-10-02 ENCOUNTER — TELEPHONE (OUTPATIENT)
Dept: INTERNAL MEDICINE CLINIC | Age: 58
End: 2018-10-02

## 2018-10-02 ENCOUNTER — HOSPITAL ENCOUNTER (OUTPATIENT)
Dept: CARDIAC CATH/INVASIVE PROCEDURES | Age: 58
Discharge: HOME | End: 2018-10-02
Attending: INTERNAL MEDICINE | Admitting: INTERNAL MEDICINE
Payer: COMMERCIAL

## 2018-10-02 VITALS
BODY MASS INDEX: 35.78 KG/M2 | WEIGHT: 270 LBS | SYSTOLIC BLOOD PRESSURE: 154 MMHG | OXYGEN SATURATION: 97 % | TEMPERATURE: 98 F | HEIGHT: 73 IN | DIASTOLIC BLOOD PRESSURE: 88 MMHG | HEART RATE: 70 BPM | RESPIRATION RATE: 18 BRPM

## 2018-10-02 LAB
ARTERIAL PATENCY WRIST A: YES
BASE DEFICIT BLDA-SCNC: 1.2 MMOL/L
BDY SITE: ABNORMAL
BREATHS.SPONTANEOUS ON VENT: 22
EST. AVERAGE GLUCOSE BLD GHB EST-MCNC: 160 MG/DL
GLUCOSE BLD STRIP.AUTO-MCNC: 179 MG/DL (ref 65–100)
GLUCOSE BLD STRIP.AUTO-MCNC: 200 MG/DL (ref 65–100)
HBA1C MFR BLD: 7.2 % (ref 4.2–6.3)
HCO3 BLDA-SCNC: 23 MMOL/L (ref 22–26)
PCO2 BLDA: 37 MMHG (ref 35–45)
PH BLDA: 7.41 [PH] (ref 7.35–7.45)
PO2 BLDA: 79 MMHG (ref 80–100)
SAO2 % BLD: 96 % (ref 92–97)
SAO2% DEVICE SAO2% SENSOR NAME: ABNORMAL
SERVICE CMNT-IMP: ABNORMAL
SERVICE CMNT-IMP: ABNORMAL
SPECIMEN SITE: ABNORMAL

## 2018-10-02 PROCEDURE — 83036 HEMOGLOBIN GLYCOSYLATED A1C: CPT | Performed by: PHYSICIAN ASSISTANT

## 2018-10-02 PROCEDURE — C1894 INTRO/SHEATH, NON-LASER: HCPCS

## 2018-10-02 PROCEDURE — 36415 COLL VENOUS BLD VENIPUNCTURE: CPT | Performed by: PHYSICIAN ASSISTANT

## 2018-10-02 PROCEDURE — 77030015766

## 2018-10-02 PROCEDURE — 77030004549 HC CATH ANGI DX PRF MRTM -A

## 2018-10-02 PROCEDURE — 94375 RESPIRATORY FLOW VOLUME LOOP: CPT

## 2018-10-02 PROCEDURE — 77030028837 HC SYR ANGI PWR INJ COEU -A

## 2018-10-02 PROCEDURE — 99152 MOD SED SAME PHYS/QHP 5/>YRS: CPT

## 2018-10-02 PROCEDURE — 93880 EXTRACRANIAL BILAT STUDY: CPT

## 2018-10-02 PROCEDURE — 74011000250 HC RX REV CODE- 250

## 2018-10-02 PROCEDURE — 77030010221 HC SPLNT WR POS TELE -B

## 2018-10-02 PROCEDURE — 74011250636 HC RX REV CODE- 250/636: Performed by: INTERNAL MEDICINE

## 2018-10-02 PROCEDURE — 77030019698 HC SYR ANGI MDLON MRTM -A

## 2018-10-02 PROCEDURE — 93922 UPR/L XTREMITY ART 2 LEVELS: CPT

## 2018-10-02 PROCEDURE — 82962 GLUCOSE BLOOD TEST: CPT

## 2018-10-02 PROCEDURE — 77030008543 HC TBNG MON PRSS MRTM -A

## 2018-10-02 PROCEDURE — C1769 GUIDE WIRE: HCPCS

## 2018-10-02 PROCEDURE — 36600 WITHDRAWAL OF ARTERIAL BLOOD: CPT | Performed by: PHYSICIAN ASSISTANT

## 2018-10-02 PROCEDURE — 77030019569 HC BND COMPR RAD TERU -B

## 2018-10-02 PROCEDURE — 74011636320 HC RX REV CODE- 636/320

## 2018-10-02 PROCEDURE — 74011250636 HC RX REV CODE- 250/636

## 2018-10-02 PROCEDURE — 82803 BLOOD GASES ANY COMBINATION: CPT | Performed by: PHYSICIAN ASSISTANT

## 2018-10-02 RX ORDER — HEPARIN SODIUM 1000 [USP'U]/ML
INJECTION, SOLUTION INTRAVENOUS; SUBCUTANEOUS
Status: COMPLETED
Start: 2018-10-02 | End: 2018-10-02

## 2018-10-02 RX ORDER — VERAPAMIL HYDROCHLORIDE 2.5 MG/ML
INJECTION, SOLUTION INTRAVENOUS
Status: COMPLETED
Start: 2018-10-02 | End: 2018-10-02

## 2018-10-02 RX ORDER — LIDOCAINE HYDROCHLORIDE 10 MG/ML
INJECTION, SOLUTION EPIDURAL; INFILTRATION; INTRACAUDAL; PERINEURAL
Status: COMPLETED
Start: 2018-10-02 | End: 2018-10-02

## 2018-10-02 RX ORDER — MIDAZOLAM HYDROCHLORIDE 1 MG/ML
.5-2 INJECTION, SOLUTION INTRAMUSCULAR; INTRAVENOUS
Status: DISCONTINUED | OUTPATIENT
Start: 2018-10-02 | End: 2018-10-02

## 2018-10-02 RX ORDER — FENTANYL CITRATE 50 UG/ML
INJECTION, SOLUTION INTRAMUSCULAR; INTRAVENOUS
Status: COMPLETED
Start: 2018-10-02 | End: 2018-10-02

## 2018-10-02 RX ORDER — VERAPAMIL HYDROCHLORIDE 2.5 MG/ML
2.5 INJECTION, SOLUTION INTRAVENOUS ONCE
Status: COMPLETED | OUTPATIENT
Start: 2018-10-02 | End: 2018-10-02

## 2018-10-02 RX ORDER — FENTANYL CITRATE 50 UG/ML
25-50 INJECTION, SOLUTION INTRAMUSCULAR; INTRAVENOUS
Status: DISCONTINUED | OUTPATIENT
Start: 2018-10-02 | End: 2018-10-02

## 2018-10-02 RX ORDER — BLOOD-GLUCOSE CONTROL, NORMAL
EACH MISCELLANEOUS
Qty: 100 LANCET | Refills: 5 | Status: SHIPPED | OUTPATIENT
Start: 2018-10-02 | End: 2020-07-15 | Stop reason: ALTCHOICE

## 2018-10-02 RX ORDER — SODIUM CHLORIDE 9 MG/ML
100 INJECTION, SOLUTION INTRAVENOUS CONTINUOUS
Status: DISCONTINUED | OUTPATIENT
Start: 2018-10-02 | End: 2018-10-02 | Stop reason: HOSPADM

## 2018-10-02 RX ORDER — MIDAZOLAM HYDROCHLORIDE 1 MG/ML
INJECTION, SOLUTION INTRAMUSCULAR; INTRAVENOUS
Status: COMPLETED
Start: 2018-10-02 | End: 2018-10-02

## 2018-10-02 RX ORDER — LIDOCAINE HYDROCHLORIDE 10 MG/ML
1-30 INJECTION, SOLUTION EPIDURAL; INFILTRATION; INTRACAUDAL; PERINEURAL
Status: DISCONTINUED | OUTPATIENT
Start: 2018-10-02 | End: 2018-10-02

## 2018-10-02 RX ORDER — HEPARIN SODIUM 200 [USP'U]/100ML
500 INJECTION, SOLUTION INTRAVENOUS ONCE
Status: COMPLETED | OUTPATIENT
Start: 2018-10-02 | End: 2018-10-02

## 2018-10-02 RX ORDER — SODIUM CHLORIDE 0.9 % (FLUSH) 0.9 %
5-10 SYRINGE (ML) INJECTION EVERY 8 HOURS
Status: DISCONTINUED | OUTPATIENT
Start: 2018-10-02 | End: 2018-10-02 | Stop reason: HOSPADM

## 2018-10-02 RX ORDER — HEPARIN SODIUM 1000 [USP'U]/ML
5000 INJECTION, SOLUTION INTRAVENOUS; SUBCUTANEOUS ONCE
Status: COMPLETED | OUTPATIENT
Start: 2018-10-02 | End: 2018-10-02

## 2018-10-02 RX ORDER — SODIUM CHLORIDE 0.9 % (FLUSH) 0.9 %
5-10 SYRINGE (ML) INJECTION AS NEEDED
Status: DISCONTINUED | OUTPATIENT
Start: 2018-10-02 | End: 2018-10-02 | Stop reason: HOSPADM

## 2018-10-02 RX ADMIN — IOPAMIDOL 80 ML: 755 INJECTION, SOLUTION INTRAVENOUS at 09:01

## 2018-10-02 RX ADMIN — IOPAMIDOL 130 ML: 755 INJECTION, SOLUTION INTRAVENOUS at 08:44

## 2018-10-02 RX ADMIN — VERAPAMIL HYDROCHLORIDE 2.5 MG: 2.5 INJECTION INTRAVENOUS at 08:30

## 2018-10-02 RX ADMIN — LIDOCAINE HYDROCHLORIDE 1 ML: 10 INJECTION, SOLUTION EPIDURAL; INFILTRATION; INTRACAUDAL; PERINEURAL at 08:28

## 2018-10-02 RX ADMIN — HEPARIN SODIUM 5000 UNITS: 1000 INJECTION, SOLUTION INTRAVENOUS; SUBCUTANEOUS at 08:30

## 2018-10-02 RX ADMIN — HEPARIN SODIUM IN SODIUM CHLORIDE 1000 UNITS: 200 INJECTION INTRAVENOUS at 08:09

## 2018-10-02 RX ADMIN — MIDAZOLAM HYDROCHLORIDE 1 MG: 1 INJECTION, SOLUTION INTRAMUSCULAR; INTRAVENOUS at 08:30

## 2018-10-02 RX ADMIN — HEPARIN SODIUM IN SODIUM CHLORIDE 1000 UNITS: 200 INJECTION INTRAVENOUS at 08:26

## 2018-10-02 RX ADMIN — SODIUM CHLORIDE 100 ML/HR: 900 INJECTION, SOLUTION INTRAVENOUS at 08:05

## 2018-10-02 RX ADMIN — FENTANYL CITRATE 25 MCG: 50 INJECTION, SOLUTION INTRAMUSCULAR; INTRAVENOUS at 08:09

## 2018-10-02 RX ADMIN — MIDAZOLAM 2 MG: 1 INJECTION INTRAMUSCULAR; INTRAVENOUS at 08:09

## 2018-10-02 RX ADMIN — NITROGLYCERIN 200 MCG: 5 INJECTION, SOLUTION INTRAVENOUS at 08:30

## 2018-10-02 RX ADMIN — HEPARIN SODIUM IN SODIUM CHLORIDE 1000 UNITS: 200 INJECTION INTRAVENOUS at 08:27

## 2018-10-02 RX ADMIN — MIDAZOLAM HYDROCHLORIDE 2 MG: 1 INJECTION, SOLUTION INTRAMUSCULAR; INTRAVENOUS at 08:09

## 2018-10-02 RX ADMIN — VERAPAMIL HYDROCHLORIDE 2.5 MG: 2.5 INJECTION, SOLUTION INTRAVENOUS at 08:30

## 2018-10-02 NOTE — DIABETES MGMT
DTC Cardiac Surgery Education Note Recommendations/ Comments: Pt should resume metformin in 48 hours post cath. Prescriptions for test strips and lancets ordered by Carmen Medley. Current hospital DM medication: none Met with pt and pt's dtr for pre-op education. Pt has a working glucometer at his office and his daughter helps him check his BG, however, he requests a new glucometer. Pt declined to give a return demo of the new glucometer. Pt reports eating regularly. Drinking sprite and sweet tea. Discussed basic meal planning using plate method. Enc pt to limit portions and discussed alternatives to sweetened beverages. Discussed a1c improvement. Pt is unaware of any changes made to improve a1c. Discussed BG goals and enc pt to check his BG ac and 2 hr pp one meal daily, alternating meals. Discussed infection risk/ need for IV insulin intra and post-op. Discussed medications and per Gurmeet Burks, pt should resume metformin in 48 hours as surgery has yet to be scheduled. Chart reviewed and initial evaluation complete on Florian Griggs Sr. 
 
Patient is a 63 y.o. male with known Type 2 Diabetes on onglyza 5mg daily and metformin 1000mg ac b/d at home. A1c: Last a1c 6.9% in July, previous 8.3% in March Lab Results Component Value Date/Time Hemoglobin A1c 7.0 (H) 09/26/2017 10:11 AM  
 
 
Assessed and instructed patient on the following:  
· risk of sternal wound infection/ delayed healing · interpretation of lab results, blood sugar goals, nutrition and referred to Diabetes Educator. Provided patient with the following: [x]         Survival skills education materials 
             []         Insulin education materials 
             []         CHO counting education materials [x]         BG guidelines for post-op patients 
                []         Outpatient DTC contact number 
             [x]         Glucometer- one touch verio []         Insulin start kit- vial/syringe 
             []         Insulin start kit- pen Patient was able to give return demonstration of: 
   []         Glucometer 
   []         saline injection  
    with []         no/ []         minimal assistance needed. []         Nurse to have patient self inject prior to discharge. Recent Glucose Results:  
Lab Results Component Value Date/Time GLUCPOC 200 (H) 10/02/2018 11:38 AM  
 GLUCPOC 179 (H) 10/02/2018 07:52 AM  
  
 
Lab Results Component Value Date/Time Creatinine 0.90 07/26/2018 08:49 AM  
 
Estimated Creatinine Clearance: 124.1 mL/min (based on Cr of 0.9). Active Orders Diet DIET DIABETIC CONSISTENT CARB Regular PO intake: No data found. Will continue to follow as needed. Thank you. Chava Holland, [place], RN, CDE [facility]

## 2018-10-02 NOTE — IP AVS SNAPSHOT
[address] [facility] [address] [facility] 
[phone] Patient: Jeffrey Paul 
MRN: [id number] Jeanette Cerrato About your hospitalization You were admitted on:  October 2, 2018 You last received care in the:  [facility] 2 INTRVNT[facility] CARDIO You were discharged on:  October 2, 2018 Why you were hospitalized Your primary diagnosis was:  Not on File Your diagnoses also included:  Cad (Coronary Artery Disease), Dm Type 2 (Diabetes Mellitus, Type 2) (Hcc), Htn (Hypertension), Benign Follow-up Information Follow up With Details Comments Contact Info Beck Harrison MD   [address] [facility] [address] [facility] 
[phone] Farnaz Rebolledo MD Schedule an appointment as soon as possible for a visit  [address] [facility] [address] [facility] 
[phone] Discharge Orders None A check suly indicates which time of day the medication should be taken. My Medications START taking these medications Instructions Each Dose to Equal  
 Morning Noon Evening Bedtime  
 glucose blood VI test strips strip Commonly known as:  Otilia Mcpherson Your last dose was: Your next dose is:    
   
   
 Twice daily  
     
   
   
   
  
 lancets 30 gauge Misc Commonly known as:  Cherylyn Milo LANCETS Your last dose was: Your next dose is:    
   
   
 Twice daily CHANGE how you take these medications Instructions Each Dose to Equal  
 Morning Noon Evening Bedtime  
 metFORMIN 500 mg tablet Commonly known as:  GLUCOPHAGE What changed:  additional instructions Your last dose was: Your next dose is: Take 1 Tab by mouth two (2) times daily (with meals). 500 mg CONTINUE taking these medications Instructions Each Dose to Equal  
 Morning Noon Evening Bedtime allopurinol 300 mg tablet Commonly known as:  Anusha Ness Your last dose was: Your next dose is: TAKE 1 TABLET BY MOUTH EVERY DAY  
     
   
   
   
  
 amLODIPine 5 mg tablet Commonly known as:  Shmia Eugene Your last dose was: Your next dose is: Take 5 mg by mouth daily. 5 mg  
    
   
   
   
  
 amoxicillin-clavulanate 875-125 mg per tablet Commonly known as:  AUGMENTIN Your last dose was: Your next dose is: Take 1 Tab by mouth every twelve (12) hours. 1 Tab  
    
   
   
   
  
 aspirin 81 mg tablet Your last dose was: Your next dose is: Take 81 mg by mouth daily. 81 mg  
    
   
   
   
  
 atenolol 100 mg tablet Commonly known as:  TENORMIN Your last dose was: Your next dose is: Take 1 tablet by mouth daily. 100 mg  
    
   
   
   
  
 bumetanide 1 mg tablet Commonly known as:  Jerral Cleve Your last dose was: Your next dose is: TAKE 1 TABLET EVERY DAY  
     
   
   
   
  
 CIALIS 5 mg tablet Generic drug:  tadalafil Your last dose was: Your next dose is: TAKE 5 MG BY MOUTH AS NEEDED. LIPITOR 80 mg tablet Generic drug:  atorvastatin Your last dose was: Your next dose is: Take 80 mg by mouth daily. 80 mg  
    
   
   
   
  
 losartan 50 mg tablet Commonly known as:  COZAAR Your last dose was: Your next dose is: Take 1 Tab by mouth daily. 50 mg  
    
   
   
   
  
 multivitamin tablet Commonly known as:  ONE A DAY Your last dose was: Your next dose is: Take 1 Tab by mouth daily. 1 Tab  
    
   
   
   
  
 omega-3 acid ethyl esters 1 gram capsule Commonly known as:  Tamiko Sanchez Your last dose was: Your next dose is: Take 2 capsules by mouth two (2) times daily (with meals). 2 g  
    
   
   
   
  
 omeprazole 20 mg capsule Commonly known as:  PRILOSEC Your last dose was: Your next dose is: Take 1 Cap by mouth daily. 20 mg  
    
   
   
   
  
 sAXagliptin 5 mg Tab tablet Commonly known as:  ONGLYZA Your last dose was: Your next dose is: Take 1 Tab by mouth daily. 5 mg STOP taking these medications   
 indomethacin 50 mg capsule Commonly known as:  INDOCIN Where to Get Your Medications Information on where to get these meds will be given to you by the nurse or doctor. ! Ask your nurse or doctor about these medications  
  glucose blood VI test strips strip  
 lancets 30 gauge Misc Discharge Instructions [address] [facility], [address]   [phone] [address]    [email or web address] Patient Discharge Instructions Zoe Wrayh / [id number] : 1960 Admitted 10/2/2018 Discharged: 10/2/2018 · It is important that you take the medication exactly as they are prescribed. · Keep your medication in the bottles provided by the pharmacist and keep a list of the medication names, dosages, and times to be taken in your wallet. · Do not take other medications without consulting your doctor. BRING ALL OF YOUR MEDICINES TO YOUR OFFICE VISIT with Beryle Norman, DO or my nurse practitioner, Lelia Cobb. Lei Acuña Follow-up with Louie Payton III, DO after bypass Cardiac Catheterization  Discharge Instructions Transradial Catheterization Discharge Instructions (WRIST) Discharge instructions: Your radial artery in your wrist was used for your cardiac catheterization. This site may be slightly bruised and sore following your procedure.  Expect mild tingling or the hand and tenderness at the puncture site for up to 3 days. Excess movement of the wrist used should be avoided for the next 24-48 hours. 1. No lifting over 2 pounds (approximately a ½ gallon of milk) with this arm for 24 hours. 2. Keep the site of the procedure covered with a bandage for 24 hours. 3. You may shower the day after your procedure. Do not take a tub bath or submerge the puncture site in water for 48 hours. 4. No heavy impact activity/lifting > 30 pounds for 1 week. If bleeding of the wrist occurs at home: If the site on your wrist where you had the catheterization procedure begins to bleed, do not panic. 1. Place 1 or 2 fingers over the puncture site and hold pressure to stop the bleeding. You may be able to feel your pulse as you hold pressure. 2. Lift your fingers after 5 minutes to see if the bleeding has stopped. 3. Once the bleeding has stopped, gently wipe the wrist area clean and cover with a bandage. If the bleeding from your wrist does not stop after 15 minutes, or if there is a large amount of bleeding or spurting, call 911 immediately (do not drive yourself to the hospital). Other concerns: The site may be slightly bruised and sore following your procedure. Should any of the following occur, contact your physician immediately: 1. Any cool or coldness of the arm, discoloration over a large area, ongoing numbness or any abnormal sensations , moderate to severe pain or swelling in the arm. 2. Redness, soreness, swelling, chills or fever, or colored drainage at the procedure site within 3-7 days after your procedure. If you have any further questions or concerns regarding your procedure please call the Cardiac Cath Lab office at [phone]. During regular business hours ask to speak to Dr. Justin Najera. During non-business hours the answering service will answer.  Ask to speak to the physician on call for [place] [facility].  
 
 Transfemoral Catheterization Discharge Instructions (GROIN) ? Do not drive, operate any machinery, or sign any legal documents for 24 hours after your procedure. You must have someone to drive you home. ? You may take a shower 24 hours after your cardiac catheterization. Be sure to get the dressing wet and then remove it; gently wash the area with warm soapy water. Pat dry and leave open to air. To help prevent infections, be sure to keep the cath site clean and dry. No lotions, creams, powders, ointments, etc. in the cath site for approximately 1 week. ? Do not take a tub bath, get in a hot tub or swimming pool for approximately 5 days or until the cath site is completely healed. ? No strenuous activity or heavy lifting over 10 lbs. for 7 days. ? Drink plenty of fluids for 24-48 hours after your cath to flush the contrast dye from your kidneys. No alcoholic beverages for 24 hours. You may resume your previous diet (low fat, low cholesterol) after your cath. ? After your cath, some bruising or discomfort is common during the healing process. Tylenol, 1-2 tablets every 6 hours as needed, is recommended if you experience any discomfort. If you experience any signs or symptoms of infection such as fever, chills, or poorly healing incision, persistent tenderness or swelling in the groin, redness and/or warmth to the touch, numbness, significant tingling or pain at the groin site or affected extremity, rash, drainage from the cath site, or if the leg feels tight or swollen, call your physician right away. ? If bleeding at the cath site occurs, take a clean gauze pad and apply direct pressure to the groin just above the puncture site. Call 911 immediately, and continue to apply direct pressure until an ambulance gets to your location. ? You may return to work  2  days after your cardiac cath if no groin bleeding.    
 
Information obtained by : 
 I understand that if any problems occur once I am at home I am to contact my physician. I understand and acknowledge receipt of the instructions indicated above. R.N.'s Signature                                                                  Date/Time Patient or Representative Signature                                                          Date/Time Rosita Myers III, [address] Right [address] [facility], [address]    [phone] [address]    [email or web address] Introducing [facility]! Dear Omar Vazquez: Thank you for requesting a [employer] account. Our records indicate that you already have an active Stay[employer] account. You can access your account anytime at [email or web address] Did you know that you can access your hospital and ER discharge instructions at any time in Stayfilm? You can also review all of your test results from your hospital stay or ER visit. Additional Information If you have questions, please visit the Frequently Asked Questions section of the [employer] website at [email or web address] Remember, Stayfilm is NOT to be used for urgent needs. For medical emergencies, dial 911. Now available from your iPhone and Android! Introducing Tyler Otto As a [facility] patient, I wanted to make you aware of our electronic visit tool called Tyler Otto. [facility] 24/7 allows you to connect within minutes with a medical provider 24 hours a day, seven days a week via a mobile device or tablet or logging into a secure website from your computer. You can access Tyler Otto from anywhere in the [place]. A virtual visit might be right for you when you have a simple condition and feel like you just dont want to get out of bed, or cant get away from work for an appointment, when your regular [employer] provider is not available (evenings, weekends or holidays), or when youre out of town and need minor care. Electronic visits cost only $49 and if the [employer] 24/7 provider determines a prescription is needed to treat your condition, one can be electronically transmitted to a nearby pharmacy*. Please take a moment to enroll today if you have not already done so. The enrollment process is free and takes just a few minutes. To enroll, please download the [employer] 24/7 mary kate to your tablet or phone, or visit [email or web address] to enroll on your computer. And, as an [address] [place] [address] patient with a [facility] account, the results of your visits will be scanned into your electronic medical record and your primary care provider will be able to view the scanned results. We urge you to continue to see your regular [employer] provider for your ongoing medical care. And while your primary care provider may not be the one available when you seek a jiglolgafin virtual visit, the peace of mind you get from getting a real diagnosis real time can be priceless. For more information on jiglolgafin, view our Frequently Asked Questions (FAQs) at [email or web address]. Sincerely, 
 
Claudia Boyd MD 
Chief Medical Officer [employer] *:  certain medications cannot be prescribed via [employer] Unresulted Labs-Please follow up with your PCP about these lab tests Order Current Status DUPLEX CAROTID BILATERAL Preliminary result MODIFIED ALLENS TEST Preliminary result Providers Seen During Your Hospitalization Provider Specialty Primary office phone [address] [facility],  Cardiology [phone] Your Primary Care Physician (PCP) Primary Care Physician Office Phone Office Fax Twan Ybarra [phone] You are allergic to the following No active allergies Recent Documentation Height Weight BMI Smoking Status 1.854 m 122.5 kg 35.62 kg/m2 Light Tobacco Smoker Emergency Contacts Name Discharge Info Relation Home Work Mobile Chula Gann DISCHARGE CAREGIVER [3] Girlfriend [18]   [phone] Andreia Heart [2] [phone] Patient Belongings The following personal items are in your possession at time of discharge: 
  Dental Appliances: With patient, Partials, Uppers  Visual Aid: None      Home Medications: Kept at bedside   Jewelry: None  Clothing: With patient, Shirt, Undergarments, Footwear, Shorts    Other Valuables: Cell Phone  Personal Items Sent to Safe: none Please provide this summary of care documentation to your next provider. Signatures-by signing, you are acknowledging that this After Visit Summary has been reviewed with you and you have received a copy. Patient Signature:  ____________________________________________________________ Date:  ____________________________________________________________  
  
[facility] Provider Signature:  ____________________________________________________________ Date:  ____________________________________________________________

## 2018-10-02 NOTE — PROGRESS NOTES
[address] [facility]  Preliminary Report:  Carotid Duplex Scan Right: 
Mild plaque noted in the right carotid system. Right ICA velocities suggest less than 50% diameter reduction. Right vertebral artery flow is antegrade. Left: 
Minimal plaque noted in the left carotid system. Left ICA velocities suggest less than 50% diameter reduction. Left vertebral artery flow is antegrade. Final report to follow.

## 2018-10-02 NOTE — PROGRESS NOTES
Pt and family given d/c instructions, medications, RX. D/C Iv and telemetry. Pt walked with Tech to main entrance

## 2018-10-02 NOTE — PROGRESS NOTES
HISTORY OF PRESENT ILLNESS Antony Summers is a 63 y.o. male. HPI 
 
ROS Physical Exam 
 
ASSESSMENT and PLAN 
{ASSESSMENT/PLAN:48994}

## 2018-10-02 NOTE — DISCHARGE INSTRUCTIONS
[address] [facility], [address]   [phone]  [address]    [email or web address]    Patient Discharge Instructions    Alycia Michael  / [id number] : 1960    Admitted 10/2/2018 Discharged: 10/2/2018       · It is important that you take the medication exactly as they are prescribed. · Keep your medication in the bottles provided by the pharmacist and keep a list of the medication names, dosages, and times to be taken in your wallet. · Do not take other medications without consulting your doctor. BRING ALL OF YOUR MEDICINES TO YOUR OFFICE VISIT with Main Resendiz DO or my nurse practitioner, Adriane Costa. .    Follow-up with Luisa Jimenez III, DO after bypass      Cardiac Catheterization  Discharge Instructions    Transradial Catheterization Discharge Instructions (WRIST)    Discharge instructions: Your radial artery in your wrist was used for your cardiac catheterization. This site may be slightly bruised and sore following your procedure. Expect mild tingling or the hand and tenderness at the puncture site for up to 3 days. Excess movement of the wrist used should be avoided for the next 24-48 hours. 1. No lifting over 2 pounds (approximately a ½ gallon of milk) with this arm for 24 hours. 2. Keep the site of the procedure covered with a bandage for 24 hours. 3. You may shower the day after your procedure. Do not take a tub bath or submerge the puncture site in water for 48 hours. 4. No heavy impact activity/lifting > 30 pounds for 1 week. If bleeding of the wrist occurs at home:   If the site on your wrist where you had the catheterization procedure begins to bleed, do not panic. 1. Place 1 or 2 fingers over the puncture site and hold pressure to stop the bleeding. You may be able to feel your pulse as you hold pressure. 2. Lift your fingers after 5 minutes to see if the bleeding has stopped.    3. Once the bleeding has stopped, gently wipe the wrist area clean and cover with a bandage. If the bleeding from your wrist does not stop after 15 minutes, or if there is a large amount of bleeding or spurting, call 911 immediately (do not drive yourself to the hospital). Other concerns: The site may be slightly bruised and sore following your procedure. Should any of the following occur, contact your physician immediately:   1. Any cool or coldness of the arm, discoloration over a large area, ongoing numbness or any abnormal sensations , moderate to severe pain or swelling in the arm. 2. Redness, soreness, swelling, chills or fever, or colored drainage at the procedure site within 3-7 days after your procedure. If you have any further questions or concerns regarding your procedure please call the Cardiac Cath Lab office at [phone]. During regular business hours ask to speak to Dr. Jose Llanes. During non-business hours the answering service will answer. Ask to speak to the physician on call for [facility].     Transfemoral Catheterization Discharge Instructions Karrie Gosselin)     Do not drive, operate any machinery, or sign any legal documents for 24 hours after your procedure. You must have someone to drive you home.  You may take a shower 24 hours after your cardiac catheterization. Be sure to get the dressing wet and then remove it; gently wash the area with warm soapy water. Pat dry and leave open to air. To help prevent infections, be sure to keep the cath site clean and dry. No lotions, creams, powders, ointments, etc. in the cath site for approximately 1 week.  Do not take a tub bath, get in a hot tub or swimming pool for approximately 5 days or until the cath site is completely healed.  No strenuous activity or heavy lifting over 10 lbs. for 7 days.  Drink plenty of fluids for 24-48 hours after your cath to flush the contrast dye from your kidneys. No alcoholic beverages for 24 hours.   You may resume your previous diet (low fat, low cholesterol) after your cath.  After your cath, some bruising or discomfort is common during the healing process. Tylenol, 1-2 tablets every 6 hours as needed, is recommended if you experience any discomfort. If you experience any signs or symptoms of infection such as fever, chills, or poorly healing incision, persistent tenderness or swelling in the groin, redness and/or warmth to the touch, numbness, significant tingling or pain at the groin site or affected extremity, rash, drainage from the cath site, or if the leg feels tight or swollen, call your physician right away.  If bleeding at the cath site occurs, take a clean gauze pad and apply direct pressure to the groin just above the puncture site. Call 911 immediately, and continue to apply direct pressure until an ambulance gets to your location.  You may return to work  2  days after your cardiac cath if no groin bleeding. Information obtained by :  I understand that if any problems occur once I am at home I am to contact my physician. I understand and acknowledge receipt of the instructions indicated above. R.N.'s Signature                                                                  Date/Time                                                                                                                                              Patient or Representative Signature                                                          Date/Time      Luisa Setting III, DO             [address]    [phone]  [place], [address]    [email or web address]

## 2018-10-02 NOTE — PROGRESS NOTES
Problem: Falls - Risk of 
Goal: *Absence of Falls Document Eloise Burnette Fall Risk and appropriate interventions in the flowsheet. Outcome: Progressing Towards Goal 
Fall Risk Interventions: 
  
 
  
 
Medication Interventions: Teach patient to arise slowly

## 2018-10-02 NOTE — PROCEDURES
[facility]  *** FINAL REPORT ***    Name: Daphne Best  MRN: [id number]    Inpatient  : 09 Aug 1960  HIS Order #: [id number]  [id number] [facility] Visit #: [id number]  Date: 02 Oct 2018    TYPE OF TEST: Peripheral Arterial Testing    REASON FOR TEST  Radial artery harvest    Right Arm  Segmentals:                     mmHg  Brachial  Radial  Ulnar  PVR:  Digit press.:  Wrist/Brachial:    Left Arm  Segmentals:                     mmHg  Brachial  Radial  Ulnar  PVR:  Digit press.:  Wrist/Brachial:    INTERPRETATION/FINDINGS  PROCEDURE:  Digital PPG waveform and systolic pressures measurements  at rest, with radial artery compression and with ulnar artery  compression. 1. Abnormal left Modified Claus's Test.  2. >40mmhg decrease with radial artery compression. 3.  Decrease in amplitude with radial artery compression. 4.  Omitted right radial compression due to radial artery access site. ADDITIONAL COMMENTS    I have personally reviewed the data relevant to the interpretation of  this  study. TECHNOLOGIST: Clint Jansen. ANISA Bell, RDMS  Signed: 10/02/2018 01:54 PM    PHYSICIAN: Jeanette Mckeon MD  Signed: 10/02/2018 05:09 PM

## 2018-10-02 NOTE — PROGRESS NOTES
Brief Procedure Note Patient: Alycia Michael Sr MRN: [id number]  SSN: [id number] YOB: 1960  Age: 63 y.o. Sex: male Date of Procedure: 10/2/2018 Pre-procedure Diagnosis: abn nuc Post-procedure Diagnosis: LM and 3v CAD Procedure: LHC Performed By: Luisa Jimenez III, DO Anesthesia: Moderate Sedation Estimated Blood Loss: Less than 10 mL Specimens:  None Findings: LM and 3v CAD Complications: None Implants: None Recommendations: CTS consult Signed By: Luisa Jimenez III, DO October 2, 2018

## 2018-10-02 NOTE — CONSULTS
Cardiothoracic Surgery Consult      Subjective:      Chief Complaint: chest pain and Miko Alexander is a 63 y.o. hypertensive, uncontrolled type 2 diabetic with peripheral neuropathy, cigarette smoking, s/p PCI to OM at age 29, cau male who was referred for opinion and advise regarding coronary revascularization  by Dr. Uriel Delgadillo / Mohini Rivas MD.     One month history of exertional chest pain with left arm radiation associated dyspnea relieved by rest/ time. No resting symptoms. Ankle edema noticed relieved while supine overnight. Denies nausea, vomiting, claudication, palpitations, PND or syncope. Cardiac Testing:     Nuclear: inferior ishemia    Echocardiogram     Cardiac catheretization films were personally reviewed  LM 60-70%  Patent OM stent  Multiple tight RCA blockages  EF ok  EDP 18    Past Medical History:   Diagnosis Date    Diabetes ([facility]     GERD (gastroesophageal reflux disease)     Gout     Heart attack ([facility] 1996    stents placed    Hypercholesteremia     Hypertension      Past Surgical History:   Procedure Laterality Date    CARDIAC SURG PROCEDURE UNLIST  1996    stent x 2    HX APPENDECTOMY      HX HEART CATHETERIZATION        Social History   Substance Use Topics    Smoking status: Light Tobacco Smoker     Packs/day: 0.25     Types: Cigarettes    Smokeless tobacco: Never Used    Alcohol use 6.0 oz/week     12 Cans of beer per week      Comment: 2 beers per week      Family History   Problem Relation Age of Onset    Diabetes Mother     No Known Problems Father      Prior to Admission medications    Medication Sig Start Date End Date Taking? Authorizing Provider   amoxicillin-clavulanate (AUGMENTIN) 875-125 mg per tablet Take 1 Tab by mouth every twelve (12) hours.  7/26/18  Yes Mohini Rivas MD   indomethacin (INDOCIN) 50 mg capsule TAKE ONE CAPSULE BY MOUTH 3 TIMES A DAY AS NEEDED 4/6/18  Yes Mohini Rivas MD   sAXagliptin (ONGLYZA) 5 mg tab tablet Take 1 Tab by mouth daily. 3/27/18  Yes Kim Pendleton MD   omeprazole (PRILOSEC) 20 mg capsule Take 1 Cap by mouth daily. 3/23/18  Yes Kim Pendleton MD   metFORMIN (GLUCOPHAGE) 500 mg tablet Take 1 Tab by mouth two (2) times daily (with meals). Patient taking differently: Take 500 mg by mouth two (2) times daily (with meals). 2 tabs Bid 3/23/18  Yes Kim Pendleton MD   losartan (COZAAR) 50 mg tablet Take 1 Tab by mouth daily. 3/23/18  Yes Kim Pendleton MD   allopurinol (ZYLOPRIM) 300 mg tablet TAKE 1 TABLET BY MOUTH EVERY DAY 3/23/18  Yes Kim Pendleton MD   bumetanide (BUMEX) 1 mg tablet TAKE 1 TABLET EVERY DAY 12/6/16  Yes Kim Pendleton MD   multivitamin (ONE A DAY) tablet Take 1 Tab by mouth daily. Yes Historical Provider   amLODIPine (NORVASC) 5 mg tablet Take 5 mg by mouth daily. 5/26/15  Yes Historical Provider   omega-3 acid ethyl esters (LOVAZA) 1 gram capsule Take 2 capsules by mouth two (2) times daily (with meals). 1/8/15  Yes Kim Pendleton MD   atenolol (TENORMIN) 100 mg tablet Take 1 tablet by mouth daily. 1/8/15  Yes Kim Pendleton MD   atorvastatin (LIPITOR) 80 mg tablet Take 80 mg by mouth daily. Yes Historical Provider   aspirin 81 mg Tab Take 81 mg by mouth daily. Yes Historical Provider   CIALIS 5 mg tablet TAKE 5 MG BY MOUTH AS NEEDED. 2/19/18   Kim Pendleton MD       No Known Allergies    Alcohol use (within 30 days of surgery):   2 ? 7 drinks per week (social drinker)    Recreational drug use:NO    Kike status or cause of death in parents and siblings if  Heart problems, stroke, or vascular disease in family members : NO    HISTORY OF NON COMPLIANCE WITH MEDICINES:NO    Prior to Admission medications    Medication Sig Start Date End Date Taking? Authorizing Provider   amoxicillin-clavulanate (AUGMENTIN) 875-125 mg per tablet Take 1 Tab by mouth every twelve (12) hours.  7/26/18  Yes Kim Pendleton MD   indomethacin (INDOCIN) 50 mg capsule TAKE ONE CAPSULE BY MOUTH 3 TIMES A DAY AS NEEDED 4/6/18  Yes Kim Pendleton MD sAXagliptin (ONGLYZA) 5 mg tab tablet Take 1 Tab by mouth daily. 3/27/18  Yes Marifer Dawson MD   omeprazole (PRILOSEC) 20 mg capsule Take 1 Cap by mouth daily. 3/23/18  Yes Marifer Dawson MD   metFORMIN (GLUCOPHAGE) 500 mg tablet Take 1 Tab by mouth two (2) times daily (with meals). Patient taking differently: Take 500 mg by mouth two (2) times daily (with meals). 2 tabs Bid 3/23/18  Yes Marifer Dawson MD   losartan (COZAAR) 50 mg tablet Take 1 Tab by mouth daily. 3/23/18  Yes Marifer Dawson MD   allopurinol (ZYLOPRIM) 300 mg tablet TAKE 1 TABLET BY MOUTH EVERY DAY 3/23/18  Yes Marifer Dawson MD   bumetanide (BUMEX) 1 mg tablet TAKE 1 TABLET EVERY DAY 12/6/16  Yes Marifer Dawson MD   multivitamin (ONE A DAY) tablet Take 1 Tab by mouth daily. Yes Historical Provider   amLODIPine (NORVASC) 5 mg tablet Take 5 mg by mouth daily. 5/26/15  Yes Historical Provider   omega-3 acid ethyl esters (LOVAZA) 1 gram capsule Take 2 capsules by mouth two (2) times daily (with meals). 1/8/15  Yes Marifer Dawson MD   atenolol (TENORMIN) 100 mg tablet Take 1 tablet by mouth daily. 1/8/15  Yes Marifer Dawson MD   atorvastatin (LIPITOR) 80 mg tablet Take 80 mg by mouth daily. Yes Historical Provider   aspirin 81 mg Tab Take 81 mg by mouth daily. Yes Historical Provider   CIALIS 5 mg tablet TAKE 5 MG BY MOUTH AS NEEDED. 2/19/18   Marifer Dawson MD       REVIEW OF SYSTEMS:     [] Unable to obtain  ROS due to  []mental status change  []sedated   []intubated   [x]Total of 13 systems reviewed as follows:     Review of Systems:   Consititutional: Denies fever or chills. Eyes:  Denies use of glasses or vision problems(cataracts). ENT:  Denies hearing or swallowing difficulty. CV:  claudication,   Resp: BOURNE  : Denies dialysis or kidney problems. GI: Bright red blood in stool, Colonoscopy last month negative. HH 13.9/38  M/S: Denies joint or bone problems, or implanted artificial hardware. Skin: Denies varicose veins, ankle edema.    Neuro: Denies strokes, or TIAs. Psych: Denies anxiety or depression. Endocrine: Denies thyroid problems. Uncontrolled diabetes with bilateral foot neuropathy. Morning BS 170s  Heme/Lymphatic: Denies easy bruising or lymphedema. Objective:     Visit Vitals    /88    Pulse 67    Temp 97.8 °F (36.6 °C)    Resp 16    Ht 6' 1\" (1.854 m)    Wt 270 lb (122.5 kg)    SpO2 99%    BMI 35.62 kg/m2       EXAM:  General:  Alert, cooperative, no distress   Mouth/Throat: Teeth and gums normal.   Neck: Supple, symmetrical, trachea midline, no adenopathy, thyroid: no enlargement/tenderness/nodules, no carotid bruit and no JVD. Lungs:   Clear to auscultation bilaterally. Heart:  Regular rate and rhythm, S1, S2 normal, no murmur, click, rub or gallop. Abdomen:   Soft, non-tender. Bowel sounds normal. No masses,  No organomegaly. Extremities: Low pre-tibial venous insufficiency changes bilateral    Pulses: 2+ and symmetric all extremities. Skin:  No rashes or lesions       Neurologic:  Gross motor and sensory apparatus intact. Labs:   Recent Labs      10/02/18   0752   GLUCPOC  179*       DIAGNOSTICS:    Carotid Artery Doppler:    Pulmonary Function Testing:  FVC-   FEV1-     Claus's: left radial abnormal not suitable for conduit      Assessment:     Active Problems:    CAD (Coronary Artery Disease) - stent 1999 (10/20/2009)      HTN (hypertension), benign (10/20/2009)      DM type 2 (diabetes mellitus, type 2) ([facility] (10/19/2013)        STS Risk Calculator:    Procedure: CAB Only   Risk of Mortality: 0.393%   Morbidity or Mortality: 7.543%   Long Length of Stay: 1.987%   Short Length of Stay: 66.559%   Permanent Stroke: 0.437%   Prolonged Ventilation: 4.459%   DSW Infection: 0.315%   Renal Failure: 2.117%   Reoperation: 2.572%     Plan:     Pre- operative assecessment in progress including: Carotid doppler, modified Claus's and PFT screening.     Stop Metformin and ARB 48 hrs before surgery    Reviewed importance of DM management and dangers of nicotine. Reviewed expected surgical course with patient and family.     Signed By: LÓPEZ Stewart     October 2, 2018       gurinder

## 2018-10-02 NOTE — PROCEDURES
Cardiac Cathetherization Note     PreOp Diagnosis:    []       Chest Pain   []       STEMI   []       Unstable Angina   []       NSTEMI   []       Cardiomyopathy/Heart Failure   [x]       Abnormal Stress Test   []       Valve Disease   []       Pre-Operative Evaluation   []       Other:      Findings/PostOp Diagnosis:   1. Left main and 3v CAD  2. Elevated LVEDP     Recommendations:   1. CTS consult for CABG eval  2. Routine post procedure & access site care   3. Continue aggressive medical management and risk factor modification     I have explained the nature of cardiac catheterization and possible percutaneous coronary intervention including risks and benefits of the procedure with the patient which include at least a 1:1000 risk for diagnostic procedure and 1/100 risk for percutaneous intervention. Risks include but are not limited to risk of heart attack, stroke, vascular trauma requiring surgical repair or transfusion, abnormal heart rhythm requiring defibrillation or pacemaker, need for intraaortic balloon pump support, renal dysfunction requiring dialysis, exacerbated gastrointestinal bleeding, allergic response to medications requiring ventilatory support, emergent cardiac surgery and even death. They also understand the need for medical compliance - particularly if stenting is required - mandating continued daily consumption of aspirin and plavix or other antiplatelet therapy. Differences between medicated stent versus bare metal stent reviewed with patient. The patient expresses an understanding and verbally consents. They also understand plans for either radial or femoral access - with unique risks to both vascular beds including arterial occlusion, vascular trauma, hematoma and need for vascular surgery. I have answered all of their questions regarding the procedure and they are willing to proceed.      Procedures: LHC, Cors, Cineflouroscopy     Indication:  As above    Procedure status: [x] Elective  [] Urgent  [] Emergent    Operators:  Toño Lowe DO    Assistants: None    Access:   [x]  RIGHT Radial  []  LEFT Radial  [] RCFA  []  LCFA  []  RCFV  []  LCFV    Catheters: 6Fr Umer JL3.5     Closure: [x]  TR Band  []  Angioseal  []  Perclose  []  Manual Compression    Tubes/Drains:  [x] No tubes or drains remain from this procedure  [] Other:     Estimated Blood Loss: Minimal     Specimens: None     Sedation: Moderate conscious sedation with IV fentany & versed, local anesthesia with 1% lidocaine. This was performed by non-anesthesia personnel and I provided direct supervision to a trained independent observer. Time under moderate sedation: 39  Min    Patient age:  63 y.o. Contrast:   210  cc  [x]  Isovue   []  Visipaque    Fluoro Time:   12.7  Min    Radiation Dose:  6995   mGy    Complications:  [x] None  [] Other:     Patient Condition at the end of the procedure:  [x] Stable  [] Other:      Hemodynamics: Ao: 773/06/618  LV: 133/17    Cors:     Dominance: [] Right  [] Left  [x] Mixed    LM: Large caliber vessel with a distal eccentric 60-70% stenosis    LAD: Moderate caliber vessel that wraps around the apex with mild proximal disease, moderate late mid disease, and a 70-80% apical stenosis  D1:  Moderate caliber vessel with luminal irregularities luminal irregularities    LCX: Moderate caliber codominant vessel with mild proximal disease and a patent stent in the AV groove portion  OM1: Moderate caliber vessel with mild diffuse disease up to 40%  LPDA: Moderate caliber vessel with two sequential stenoses of 80%    RCA: Moderate caliber codominant vessel with an ostial aneurysmal segment and a diffusely diseased vessel w/ a focal mid 80-90% stenosis  PDA: Small caliber vessel with moderate diffuse disease  PLB: Small caliber vessel with moderate diffuse disease      LV angiography: N/A  EF:   Wall motion:   MR:    Fannie Goddard III,

## 2018-10-02 NOTE — PROGRESS NOTES
Patient seen and examined. History, studies and films reviewed. Agree with LÓPEZ Erwin HPI and exam and interpretation of the cath films. He will needs 3 possibly 4 bypasses - and all appear amenable to bypass grafting. He is a low risk candidate for CABG. I discussed the indications for CABG surgery, risks and benefits specific to him, and the pros and cons of alternative treatments. He expressed an interest in having surgery as soon as possible, which would be Monday of next week. We will plan on a 3 vessel bypass. Discussed with Dr. Baron Hidalgo as well. Our office will call and schedule him for PAT and then CABG surgery on Monday of next week. Thank you for this consult.

## 2018-10-03 DIAGNOSIS — I25.118 CORONARY ARTERY DISEASE OF NATIVE ARTERY OF NATIVE HEART WITH STABLE ANGINA PECTORIS (HCC): Primary | ICD-10-CM

## 2018-10-03 RX ORDER — MUPIROCIN 20 MG/G
OINTMENT TOPICAL 2 TIMES DAILY
Qty: 22 G | Refills: 0 | Status: ON HOLD | OUTPATIENT
Start: 2018-10-07 | End: 2018-10-11

## 2018-10-03 RX ORDER — VITAMIN E 1000 UNIT
1000 CAPSULE ORAL DAILY
Qty: 2 TAB | Refills: 0 | Status: ON HOLD | OUTPATIENT
Start: 2018-10-06 | End: 2018-10-12

## 2018-10-03 RX ORDER — AMIODARONE HYDROCHLORIDE 400 MG/1
400 TABLET ORAL DAILY
Qty: 2 TAB | Refills: 0 | Status: SHIPPED | OUTPATIENT
Start: 2018-10-03 | End: 2018-10-05

## 2018-10-03 RX ORDER — HYDRALAZINE HYDROCHLORIDE 10 MG/1
10 TABLET, FILM COATED ORAL 4 TIMES DAILY
Qty: 8 TAB | Refills: 0 | Status: ON HOLD | OUTPATIENT
Start: 2018-10-06 | End: 2018-10-11

## 2018-10-03 RX ORDER — CHLORHEXIDINE GLUCONATE 1.2 MG/ML
15 RINSE ORAL 2 TIMES DAILY
Qty: 1 BOTTLE | Refills: 0 | Status: SHIPPED | OUTPATIENT
Start: 2018-10-07 | End: 2018-10-12

## 2018-10-03 NOTE — H&P
Cardiothoracic Surgery History and Physical 
  
  
Subjective:  
   
No changes since original consult Chief Complaint: chest pain and BOURNE 
  
Fredda Fraction Sr is a 63 y.o. hypertensive, uncontrolled type 2 diabetic with peripheral neuropathy, cigarette smoking, s/p PCI to OM at age 29, cau male who was referred for opinion and advise regarding coronary revascularization  by Dr. George Kenney / Winston Gifford MD.  
  
One month history of exertional chest pain with left arm radiation associated dyspnea relieved by rest/ time. No resting symptoms. Ankle edema noticed relieved while supine overnight. Denies nausea, vomiting, claudication, palpitations, PND or syncope.  
  
Cardiac Testing:  
  
Nuclear: inferior ishemia 
  
Echocardiogram 
 Left ventricle: Systolic function was normal. Ejection fraction was 
estimated in the range of 55 % to 60 %. There were no regional wall motion 
abnormalities. There was mild concentric hypertrophy. Right ventricle: The size was at the upper limits of normal. 
 
INDICATIONS: Pre Op Heart surgery. PROCEDURE: This was an urgent study. The study included complete 2D 
imaging, M-mode, complete spectral Doppler, and color Doppler. Image 
quality was adequate. LEFT VENTRICLE: Size was normal. Systolic function was normal. Ejection 
fraction was estimated in the range of 55 % to 60 %. There were no 
regional wall motion abnormalities. There was mild concentric hypertrophy. RIGHT VENTRICLE: The size was at the upper limits of normal. Systolic 
function was normal. 
 
LEFT ATRIUM: Size was normal. 
 
RIGHT ATRIUM: Size was normal. 
 
MITRAL VALVE: Normal valve structure. There was normal leaflet separation. DOPPLER: The transmitral velocity was within the normal range. There was 
no evidence for stenosis. There was trivial regurgitation. AORTIC VALVE: The valve was trileaflet. Leaflets exhibited normal 
thickness and normal cuspal separation.  DOPPLER: Transaortic velocity was within the normal range. There was no stenosis. There was no regurgitation. TRICUSPID VALVE: Normal valve structure. There was normal leaflet 
separation. DOPPLER: The transtricuspid velocity was within the normal 
range. There was no evidence for tricuspid stenosis. There was trivial 
regurgitation. PULMONIC VALVE: Not well visualized, but normal Doppler findings. DOPPLER: 
There was no regurgitation. AORTA: The root exhibited normal size.  
 
Cardiac catheretization films were personally reviewed LM 60-70% Patent OM stent Multiple tight RCA blockages EF ok 
EDP 18 
  
    
Past Medical History:  
Diagnosis Date  Diabetes ([facility] [address]    
 GERD (gastroesophageal reflux disease)    
 Gout    
 Heart attack ([facility] [address] 1996  
  stents placed  Hypercholesteremia    
 Hypertension    
  
     
Past Surgical History:  
Procedure Laterality Date  CARDIAC SURG PROCEDURE UNLIST   1996  
  stent x 2  
 HX APPENDECTOMY      
 HX HEART CATHETERIZATION      
  
       
Social History Substance Use Topics  Smoking status: Light Tobacco Smoker  
    Packs/day: 0.25  
    Types: Cigarettes  Smokeless tobacco: Never Used  Alcohol use 6.0 oz/week   
    12 Cans of beer per week   
      Comment: 2 beers per week Family History Problem Relation Age of Onset  Diabetes Mother    
 No Known Problems Father    
  
       
Prior to Admission medications Medication Sig Start Date End Date Taking? Authorizing Provider  
amoxicillin-clavulanate (AUGMENTIN) 875-125 mg per tablet Take 1 Tab by mouth every twelve (12) hours. 7/26/18   Yes Charla Bean MD  
indomethacin (INDOCIN) 50 mg capsule TAKE ONE CAPSULE BY MOUTH 3 TIMES A DAY AS NEEDED 4/6/18   Yes Charla Bean MD  
sAXagliptin (ONGLYZA) 5 mg tab tablet Take 1 Tab by mouth daily. 3/27/18   Yes Charla Bean MD  
omeprazole (PRILOSEC) 20 mg capsule Take 1 Cap by mouth daily.  3/23/18   Yes Charla Bean MD  
 metFORMIN (GLUCOPHAGE) 500 mg tablet Take 1 Tab by mouth two (2) times daily (with meals). Patient taking differently: Take 500 mg by mouth two (2) times daily (with meals). 2 tabs Bid 3/23/18   Yes Belenda Siemens, MD  
losartan (COZAAR) 50 mg tablet Take 1 Tab by mouth daily. 3/23/18   Yes Belenda Siemens, MD  
allopurinol (ZYLOPRIM) 300 mg tablet TAKE 1 TABLET BY MOUTH EVERY DAY 3/23/18   Yes Belenda Siemens, MD  
bumetanide (BUMEX) 1 mg tablet TAKE 1 TABLET EVERY DAY 12/6/16   Yes Belenda Siemens, MD  
multivitamin (ONE A DAY) tablet Take 1 Tab by mouth daily.     Yes Historical Provider  
amLODIPine (NORVASC) 5 mg tablet Take 5 mg by mouth daily. 5/26/15   Yes Historical Provider  
omega-3 acid ethyl esters (LOVAZA) 1 gram capsule Take 2 capsules by mouth two (2) times daily (with meals). 1/8/15   Yes Belenda Siemens, MD  
atenolol (TENORMIN) 100 mg tablet Take 1 tablet by mouth daily. 1/8/15   Yes Belenda Siemens, MD  
atorvastatin (LIPITOR) 80 mg tablet Take 80 mg by mouth daily.     Yes Historical Provider  
aspirin 81 mg Tab Take 81 mg by mouth daily.     Yes Historical Provider CIALIS 5 mg tablet TAKE 5 MG BY MOUTH AS NEEDED. 2/19/18     Belenda Siemens, MD  
   
No Known Allergies 
  
Alcohol use (within 30 days of surgery):  
2 ? 7 drinks per week (social drinker) 
  
Recreational drug use:NO 
  
Kike status or cause of death in parents and siblings if 
Heart problems, stroke, or vascular disease in family members : NO 
  
HISTORY OF NON COMPLIANCE WITH MEDICINES:NO 
  
       
Prior to Admission medications Medication Sig Start Date End Date Taking? Authorizing Provider  
amoxicillin-clavulanate (AUGMENTIN) 875-125 mg per tablet Take 1 Tab by mouth every twelve (12) hours. 7/26/18   Yes Belenda Siemens, MD  
indomethacin (INDOCIN) 50 mg capsule TAKE ONE CAPSULE BY MOUTH 3 TIMES A DAY AS NEEDED 4/6/18   Yes Belenda Siemens, MD  
sAXagliptin (ONGLYZA) 5 mg tab tablet Take 1 Tab by mouth daily.  3/27/18   Yes Belenda Siemens, MD  
 omeprazole (PRILOSEC) 20 mg capsule Take 1 Cap by mouth daily. 3/23/18   Yes Cynthia Neff MD  
metFORMIN (GLUCOPHAGE) 500 mg tablet Take 1 Tab by mouth two (2) times daily (with meals). Patient taking differently: Take 500 mg by mouth two (2) times daily (with meals). 2 tabs Bid 3/23/18   Yes Cynthia Neff MD  
losartan (COZAAR) 50 mg tablet Take 1 Tab by mouth daily. 3/23/18   Yes Cynthia Neff MD  
allopurinol (ZYLOPRIM) 300 mg tablet TAKE 1 TABLET BY MOUTH EVERY DAY 3/23/18   Yes Cynthia Neff MD  
bumetanide (BUMEX) 1 mg tablet TAKE 1 TABLET EVERY DAY 12/6/16   Yes Cynthia Neff MD  
multivitamin (ONE A DAY) tablet Take 1 Tab by mouth daily.     Yes Historical Provider  
amLODIPine (NORVASC) 5 mg tablet Take 5 mg by mouth daily. 5/26/15   Yes Historical Provider  
omega-3 acid ethyl esters (LOVAZA) 1 gram capsule Take 2 capsules by mouth two (2) times daily (with meals). 1/8/15   Yes Cynthia Neff MD  
atenolol (TENORMIN) 100 mg tablet Take 1 tablet by mouth daily. 1/8/15   Yes Cynthia Neff MD  
atorvastatin (LIPITOR) 80 mg tablet Take 80 mg by mouth daily.     Yes Historical Provider  
aspirin 81 mg Tab Take 81 mg by mouth daily.     Yes Historical Provider CIALIS 5 mg tablet TAKE 5 MG BY MOUTH AS NEEDED. 2/19/18     Cynthia Neff MD  
  
  
REVIEW OF SYSTEMS:   
 [] Unable to obtain  ROS due to  []mental status change  []sedated   []intubated 
 [x]Total of 13 systems reviewed as follows:  
  
Review of Systems:  
Consititutional: Denies fever or chills. Eyes:  Denies use of glasses or vision problems(cataracts). ENT:  Denies hearing or swallowing difficulty. CV:  claudication, Resp: BOURNE 
: Denies dialysis or kidney problems. GI: Bright red blood in stool, Colonoscopy last month negative. HH 13.9/38 M/S: Denies joint or bone problems, or implanted artificial hardware. Skin: Denies varicose veins, ankle edema. Neuro: Denies strokes, or TIAs. Psych: Denies anxiety or depression. Endocrine: Denies thyroid problems. Uncontrolled diabetes with bilateral foot neuropathy. Morning BS 170s Heme/Lymphatic: Denies easy bruising or lymphedema.  
  
Objective:  
  
    
Visit Vitals  /88  Pulse 67  Temp 97.8 °F (36.6 °C)  Resp 16  
 Ht 6' 1\" (1.854 m)  Wt 270 lb (122.5 kg)  SpO2 99%  BMI 35.62 kg/m2  
  
  
EXAM: 
General:  Alert, cooperative, no distress Mouth/Throat: Teeth and gums normal.  
Neck: Supple, symmetrical, trachea midline, no adenopathy, thyroid: no enlargement/tenderness/nodules, no carotid bruit and no JVD. Lungs:   Clear to auscultation bilaterally. Heart:  Regular rate and rhythm, S1, S2 normal, no murmur, click, rub or gallop. Abdomen:   Soft, non-tender. Bowel sounds normal. No masses,  No organomegaly. Extremities: Low pre-tibial venous insufficiency changes bilateral   
Pulses: 2+ and symmetric all extremities. Skin:  No rashes or lesions  
     
Neurologic:  Gross motor and sensory apparatus intact.  
  
Labs:  
   
Recent Labs  
    10/02/18 
 8960 GLUCPOC  179*  
  
  
DIAGNOSTICS: 
  
Carotid Artery Doppler: 
 1. Bilateral <50% stenosis of the internal carotid arteries. 2. No significant stenosis in the external carotid arteries 
bilaterally. 3. Antegrade flow in both vertebral arteries. Pulmonary Function Testing: FVC- 5.o5 FEV1- 3.34 
  
Claus's: left radial abnormal not suitable for conduit 
  
  
Assessment:  
  
Active Problems: 
  CAD (Coronary Artery Disease) - stent 1999 (10/20/2009) 
  
  HTN (hypertension), benign (10/20/2009) 
  
  DM type 2 (diabetes mellitus, type 2) ([facility] [place] (10/19/2013)   
  
  
STS Risk Calculator: 
  
Procedure: CAB Only Risk of Mortality: 0.393% Morbidity or Mortality: 7.543% Long Length of Stay: 1.987% Short Length of Stay: 66.559% Permanent Stroke: 0.437% Prolonged Ventilation: 4.459% DSW Infection: 0.315% Renal Failure: 2.117% Reoperation: 2.572%  
  
Plan:  
  
  
 Stop Metformin and ARB 48 hrs before surgery 10/5/18 
  
Reviewed importance of DM management and dangers of nicotine.  
  
Reviewed expected surgical course with patient and family.

## 2018-10-04 ENCOUNTER — ANESTHESIA EVENT (OUTPATIENT)
Dept: CARDIOTHORACIC SURGERY | Age: 58
DRG: 236 | End: 2018-10-04
Payer: COMMERCIAL

## 2018-10-04 ENCOUNTER — HOSPITAL ENCOUNTER (OUTPATIENT)
Dept: NON INVASIVE DIAGNOSTICS | Age: 58
Discharge: HOME | End: 2018-10-04
Attending: PHYSICIAN ASSISTANT
Payer: COMMERCIAL

## 2018-10-04 ENCOUNTER — HOSPITAL ENCOUNTER (OUTPATIENT)
Dept: PREADMISSION TESTING | Age: 58
Discharge: HOME | End: 2018-10-04
Attending: THORACIC SURGERY (CARDIOTHORACIC VASCULAR SURGERY)
Payer: COMMERCIAL

## 2018-10-04 ENCOUNTER — HOSPITAL ENCOUNTER (OUTPATIENT)
Dept: GENERAL RADIOLOGY | Age: 58
Discharge: HOME | End: 2018-10-04
Attending: PHYSICIAN ASSISTANT
Payer: COMMERCIAL

## 2018-10-04 VITALS
OXYGEN SATURATION: 98 % | HEIGHT: 73 IN | DIASTOLIC BLOOD PRESSURE: 87 MMHG | HEART RATE: 66 BPM | TEMPERATURE: 98.8 F | BODY MASS INDEX: 36.52 KG/M2 | RESPIRATION RATE: 16 BRPM | SYSTOLIC BLOOD PRESSURE: 166 MMHG | WEIGHT: 275.57 LBS

## 2018-10-04 DIAGNOSIS — I25.118 CORONARY ARTERY DISEASE OF NATIVE ARTERY OF NATIVE HEART WITH STABLE ANGINA PECTORIS (HCC): Primary | ICD-10-CM

## 2018-10-04 LAB
ALBUMIN SERPL-MCNC: 4.2 G/DL (ref 3.5–5)
ALBUMIN/GLOB SERPL: 1.3 {RATIO} (ref 1.1–2.2)
ALP SERPL-CCNC: 103 U/L (ref 45–117)
ALT SERPL-CCNC: 44 U/L (ref 12–78)
ANION GAP SERPL CALC-SCNC: 8 MMOL/L (ref 5–15)
APPEARANCE UR: CLEAR
APTT PPP: 28.5 SEC (ref 22.1–32)
AST SERPL-CCNC: 30 U/L (ref 15–37)
ATRIAL RATE: 64 BPM
BACTERIA URNS QL MICRO: NEGATIVE /HPF
BASOPHILS # BLD: 0 K/UL (ref 0–0.1)
BASOPHILS NFR BLD: 1 % (ref 0–1)
BILIRUB SERPL-MCNC: 0.5 MG/DL (ref 0.2–1)
BILIRUB UR QL: NEGATIVE
BNP SERPL-MCNC: 13 PG/ML (ref 0–125)
BUN SERPL-MCNC: 11 MG/DL (ref 6–20)
BUN/CREAT SERPL: 13 (ref 12–20)
CALCIUM SERPL-MCNC: 8.8 MG/DL (ref 8.5–10.1)
CALCULATED P AXIS, ECG09: 39 DEGREES
CALCULATED R AXIS, ECG10: 9 DEGREES
CALCULATED T AXIS, ECG11: 38 DEGREES
CHLORIDE SERPL-SCNC: 105 MMOL/L (ref 97–108)
CO2 SERPL-SCNC: 26 MMOL/L (ref 21–32)
COLOR UR: NORMAL
CREAT SERPL-MCNC: 0.86 MG/DL (ref 0.7–1.3)
DIAGNOSIS, 93000: NORMAL
DIFFERENTIAL METHOD BLD: NORMAL
EOSINOPHIL # BLD: 0.1 K/UL (ref 0–0.4)
EOSINOPHIL NFR BLD: 1 % (ref 0–7)
EPITH CASTS URNS QL MICRO: NORMAL /LPF
ERYTHROCYTE [DISTWIDTH] IN BLOOD BY AUTOMATED COUNT: 12.9 % (ref 11.5–14.5)
EST. AVERAGE GLUCOSE BLD GHB EST-MCNC: 154 MG/DL
GLOBULIN SER CALC-MCNC: 3.2 G/DL (ref 2–4)
GLUCOSE SERPL-MCNC: 170 MG/DL (ref 65–100)
GLUCOSE UR STRIP.AUTO-MCNC: NEGATIVE MG/DL
HBA1C MFR BLD: 7 % (ref 4.2–6.3)
HCT VFR BLD AUTO: 38.5 % (ref 36.6–50.3)
HGB BLD-MCNC: 13.3 G/DL (ref 12.1–17)
HGB UR QL STRIP: NEGATIVE
HYALINE CASTS URNS QL MICRO: NORMAL /LPF (ref 0–5)
IMM GRANULOCYTES # BLD: 0 K/UL (ref 0–0.04)
IMM GRANULOCYTES NFR BLD AUTO: 0 % (ref 0–0.5)
INR PPP: 1 (ref 0.9–1.1)
KETONES UR QL STRIP.AUTO: NEGATIVE MG/DL
LEUKOCYTE ESTERASE UR QL STRIP.AUTO: NEGATIVE
LYMPHOCYTES # BLD: 1.5 K/UL (ref 0.8–3.5)
LYMPHOCYTES NFR BLD: 30 % (ref 12–49)
MCH RBC QN AUTO: 29.4 PG (ref 26–34)
MCHC RBC AUTO-ENTMCNC: 34.5 G/DL (ref 30–36.5)
MCV RBC AUTO: 85 FL (ref 80–99)
MONOCYTES # BLD: 0.5 K/UL (ref 0–1)
MONOCYTES NFR BLD: 9 % (ref 5–13)
NEUTS SEG # BLD: 2.8 K/UL (ref 1.8–8)
NEUTS SEG NFR BLD: 58 % (ref 32–75)
NITRITE UR QL STRIP.AUTO: NEGATIVE
NRBC # BLD: 0 K/UL (ref 0–0.01)
NRBC BLD-RTO: 0 PER 100 WBC
P-R INTERVAL, ECG05: 160 MS
PH UR STRIP: 5.5 [PH] (ref 5–8)
PLATELET # BLD AUTO: 177 K/UL (ref 150–400)
PMV BLD AUTO: 9.7 FL (ref 8.9–12.9)
POTASSIUM SERPL-SCNC: 4 MMOL/L (ref 3.5–5.1)
PROT SERPL-MCNC: 7.4 G/DL (ref 6.4–8.2)
PROT UR STRIP-MCNC: NEGATIVE MG/DL
PROTHROMBIN TIME: 9.8 SEC (ref 9–11.1)
Q-T INTERVAL, ECG07: 396 MS
QRS DURATION, ECG06: 96 MS
QTC CALCULATION (BEZET), ECG08: 408 MS
RBC # BLD AUTO: 4.53 M/UL (ref 4.1–5.7)
RBC #/AREA URNS HPF: NORMAL /HPF (ref 0–5)
SODIUM SERPL-SCNC: 139 MMOL/L (ref 136–145)
SP GR UR REFRACTOMETRY: 1.01 (ref 1–1.03)
THERAPEUTIC RANGE,PTTT: NORMAL SECS (ref 58–77)
TSH SERPL DL<=0.05 MIU/L-ACNC: 1.66 UIU/ML (ref 0.36–3.74)
UROBILINOGEN UR QL STRIP.AUTO: 1 EU/DL (ref 0.2–1)
VENTRICULAR RATE, ECG03: 64 BPM
WBC # BLD AUTO: 4.9 K/UL (ref 4.1–11.1)
WBC URNS QL MICRO: NORMAL /HPF (ref 0–4)

## 2018-10-04 PROCEDURE — 87086 URINE CULTURE/COLONY COUNT: CPT | Performed by: PHYSICIAN ASSISTANT

## 2018-10-04 PROCEDURE — 80053 COMPREHEN METABOLIC PANEL: CPT | Performed by: PHYSICIAN ASSISTANT

## 2018-10-04 PROCEDURE — 86923 COMPATIBILITY TEST ELECTRIC: CPT | Performed by: THORACIC SURGERY (CARDIOTHORACIC VASCULAR SURGERY)

## 2018-10-04 PROCEDURE — 86900 BLOOD TYPING SEROLOGIC ABO: CPT | Performed by: THORACIC SURGERY (CARDIOTHORACIC VASCULAR SURGERY)

## 2018-10-04 PROCEDURE — 93005 ELECTROCARDIOGRAM TRACING: CPT

## 2018-10-04 PROCEDURE — 85610 PROTHROMBIN TIME: CPT | Performed by: PHYSICIAN ASSISTANT

## 2018-10-04 PROCEDURE — 71046 X-RAY EXAM CHEST 2 VIEWS: CPT

## 2018-10-04 PROCEDURE — 83880 ASSAY OF NATRIURETIC PEPTIDE: CPT | Performed by: PHYSICIAN ASSISTANT

## 2018-10-04 PROCEDURE — 84443 ASSAY THYROID STIM HORMONE: CPT | Performed by: PHYSICIAN ASSISTANT

## 2018-10-04 PROCEDURE — 36415 COLL VENOUS BLD VENIPUNCTURE: CPT | Performed by: PHYSICIAN ASSISTANT

## 2018-10-04 PROCEDURE — 85025 COMPLETE CBC W/AUTO DIFF WBC: CPT | Performed by: PHYSICIAN ASSISTANT

## 2018-10-04 PROCEDURE — 85730 THROMBOPLASTIN TIME PARTIAL: CPT | Performed by: PHYSICIAN ASSISTANT

## 2018-10-04 PROCEDURE — 83036 HEMOGLOBIN GLYCOSYLATED A1C: CPT | Performed by: PHYSICIAN ASSISTANT

## 2018-10-04 PROCEDURE — 81001 URINALYSIS AUTO W/SCOPE: CPT | Performed by: PHYSICIAN ASSISTANT

## 2018-10-04 PROCEDURE — 93306 TTE W/DOPPLER COMPLETE: CPT

## 2018-10-04 NOTE — PERIOP NOTES
Pt returned from ECHO and Chest Xray. Anesthesia, Dr Watson Province in to see pt.  Prudence TAVARES

## 2018-10-04 NOTE — PERIOP NOTES
[facility] Preoperative Instructions Surgery Date 10/8/2018         Time of Arrival 5:45 AM 
 
1. On the day of your surgery, please report to the Surgical Services Registration Desk and sign in at your designated time. The Surgery Center is located to the right of the Emergency Room. 2. You must have someone with you to drive you home. You should not drive a car for 24 hours following surgery. Please make arrangements for a friend or family member to stay with you for the first 24 hours after your surgery. 3. Do not have anything to eat or drink (including water, gum, mints, coffee, juice) after midnight 10/7/2018?? Walter Celaya ? This may not apply to medications prescribed by your physician. ?(Please note below the special instructions with medications to take the morning of your procedure.) 4. We recommend you do not drink any alcoholic beverages for 24 hours before and after your surgery. 5. Contact your surgeons office for instructions on the following medications: non-steroidal anti-inflammatory drugs (i.e. Advil, Aleve), vitamins, and supplements. (Some surgeons will want you to stop these medications prior to surgery and others may allow you to take them) **If you are currently taking Plavix, Coumadin, Aspirin and/or other blood-thinning agents, contact your surgeon for instructions. ** Your surgeon will partner with the physician prescribing these medications to determine if it is safe to stop or if you need to continue taking. Please do not stop taking these medications without instructions from your surgeon 6. Wear comfortable clothes. Wear glasses instead of contacts. Do not bring any money or jewelry. Please bring picture ID, insurance card, and any prearranged co-payment or hospital payment. Do not wear make-up, particularly mascara the morning of your surgery. Do not wear nail polish, particularly if you are having foot /hand surgery.   Wear your hair loose or down, no ponytails, buns, sahara pins or clips. All body piercings must be removed. Please shower with antibacterial soap for three consecutive days before and on the morning of surgery, but do not apply any lotions, powders or deodorants after the shower on the day of surgery. Please use a fresh towels after each shower. Please sleep in clean clothes and change bed linens the night before surgery. Please do not shave for 48 hours prior to surgery. Shaving of the face is acceptable. 7. You should understand that if you do not follow these instructions your surgery may be cancelled. If your physical condition changes (I.e. fever, cold or flu) please contact your surgeon as soon as possible. 8. It is important that you be on time. If a situation occurs where you may be late, please call [phone] (OR Holding Area). 9. If you have any questions and or problems, please call [phone] (Pre-admission Testing). 10. Your surgery time may be subject to change. You will receive a phone call the evening prior if your time changes. 11.  If having outpatient surgery, you must have someone to drive you here, stay with you during the duration of your stay, and to drive you home at time of discharge. 12.   In an effort to improve the efficiency, privacy, and safety for all of our Pre-op patients visitors are not allowed in the Holding area. Once you arrive and are registered your family/visitors will be asked to remain in the waiting room. The Pre-op staff will get you from the Surgical Waiting Area and will explain to you and your family/visitors that the Pre-op phase is beginning. The staff will answer any questions and provide instructions for tracking of the patient, by use of the existing tracking number and color-coded status board in the waiting room.   At this time the staff will also ask for your designated spokesperson information in the event that the physician or staff need to provide an update or obtain any pertinent information. The designated spokesperson will be notified if the physician needs to speak to family during the pre-operative phase. If at any time your family/visitors has questions or concerns they may approach the volunteer desk in the waiting area for assistance. Special Instructions: Bring Your Incentive spirometer with you to the hospital the day of surgery. Start Mupirocin ointment to both nostrils two times a day on 10/7/2018 for 7 days  and bring with you to the hospital. Start Peridex Mouth Rinse  15 ml. by mouth, rinse and spit two times a day on 10/7/2018 for 7 days and bring with you to the hospital. Your LAST dose of losartan will be 10/5/2018. Take [id number] mg four times a day on 10/6/2018 and 10/7/2018. Take Amiodarone 400 mg two times a day on 10/5/2018 and 10/6/2018. Last dose of Metformin on 10/5/2018. Start Vitamin C 1,000mg per day 10/4/2017 through 10/7/2018. MEDICATIONS TO TAKE THE MORNING OF SURGERY WITH A SIP OF WATER: No Medications the morning of surgery. I understand a pre-operative phone call will be made to verify my surgery time. In the event that I am not available, I give permission for a message to be left on my answering service and/or with another person? Yes [phone] 
 
 
 
 ___________________      __________   _________ 
  (Signature of Patient)             (Witness)                (Date and Time)

## 2018-10-04 NOTE — PERIOP NOTES
Incentive Candelario Rasmussen Using the incentive spirometer helps expand the small air sacs of your lungs, helps you breathe deeply, and helps improve your lung function. Use your incentive spirometer twice a day (10 breaths each time) prior to surgery. How to Use Your Incentive Spirometer: 1. Hold the incentive spirometer in an upright position. 2. Breathe out as usual.  
3. Place the mouthpiece in your mouth and seal your lips tightly around it. 4. Take a deep breath. Breathe in slowly and as deeply as possible. Keep the blue flow rate guide between the arrows. 5. Hold your breath as long as possible. Then exhale slowly and allow the piston to fall to the bottom of the column. 6. Rest for a few seconds and repeat steps one through five at least 10 times. PAT Tidal Volume_____2500_____________  x_______1_________  Date_________10/4/2018______________ BRING THE INCENTIVE SPIROMETER WITH YOU TO THE HOSPITAL ON THE DAY OF YOUR SURGERY. Opportunity given to ask and answer questions as well as to observe return demonstration. Patient signature_____________________________    Witness___K. Oliver Coldhilaria RN_________________________

## 2018-10-04 NOTE — PROGRESS NOTES
Anesthesiology Consult Requested by the surgeon to evaluate for 1. General anesthesia with intubation 2. TRACI  
3. Invasive monitoring and catheters 
for proposed procedure. Subjective:  
  
Patient:  Marbella Beryr Sr  
 
Procedure: No Known Allergies Current Outpatient Prescriptions Medication Sig  [START ON 10/6/2018] ascorbic acid, vitamin C, (VITAMIN C) 1,000 mg tablet Take 1 Tab by mouth daily.  sAXagliptin (ONGLYZA) 5 mg tab tablet Take 1 Tab by mouth daily.  omeprazole (PRILOSEC) 20 mg capsule Take 1 Cap by mouth daily.  metFORMIN (GLUCOPHAGE) 500 mg tablet Take 1 Tab by mouth two (2) times daily (with meals). (Patient taking differently: Take 500 mg by mouth two (2) times daily (with meals). 2 tabs Bid)  losartan (COZAAR) 50 mg tablet Take 1 Tab by mouth daily.  allopurinol (ZYLOPRIM) 300 mg tablet TAKE 1 TABLET BY MOUTH EVERY DAY  CIALIS 5 mg tablet TAKE 5 MG BY MOUTH AS NEEDED.  bumetanide (BUMEX) 1 mg tablet TAKE 1 TABLET EVERY DAY  multivitamin (ONE A DAY) tablet Take 1 Tab by mouth daily.  amLODIPine (NORVASC) 5 mg tablet Take 5 mg by mouth daily.  atenolol (TENORMIN) 100 mg tablet Take 1 tablet by mouth daily.  atorvastatin (LIPITOR) 80 mg tablet Take 80 mg by mouth daily.  aspirin 81 mg Tab Take 81 mg by mouth daily.  [START ON 10/7/2018] mupirocin (BACTROBAN) 2 % ointment by Both Nostrils route two (2) times a day for 7 days.  amiodarone (PACERONE) 400 mg tablet Take 1 Tab by mouth daily for 2 days.  [START ON 10/7/2018] chlorhexidine (PERIDEX) 0.12 % solution Take 15 mL by mouth two (2) times a day for 7 days. Rising and spit  [START ON 10/6/2018] hydrALAZINE (APRESOLINE) 10 mg tablet Take 1 Tab by mouth four (4) times daily for 2 days. Indications: hypertension  glucose blood VI test strips (ONETOUCH VERIO) strip Twice daily  lancets (ONETOUCH DELICA LANCETS) 30 gauge misc Twice daily  amoxicillin-clavulanate (AUGMENTIN) 875-125 mg per tablet Take 1 Tab by mouth every twelve (12) hours. No current facility-administered medications for this encounter. Past Medical History:  
Diagnosis Date  Diabetes ([facility] [address]  GERD (gastroesophageal reflux disease)  Gout  Heart attack ([facility] [address]   
 stents placed  Hypercholesteremia  Hypertension  Ill-defined condition Gout  Sleep apnea Has not used in 3-4 months Past Surgical History:  
Procedure Laterality Date  [facility] [address]  
 stent x 2  
 HX APPENDECTOMY  HX HEART CATHETERIZATION Social History Substance Use Topics  Smoking status: Light Tobacco Smoker Packs/day: 0.25 Years: 10.00 Types: Cigarettes  Smokeless tobacco: Never Used  Alcohol use Yes Comment: 22 drinks per week Anesthetic Problems: none Denies a history of dysphagia, gastric ulcer, diverticulum, esophageal stricture, stomach surgeries or radiation in the past. 
 
Objective:  
 
Physical Exam: 
 
Patient Vitals for the past 8 hrs: 
 BP Temp Pulse Resp SpO2 Height Weight 10/04/18 1458 166/87 37.1 °C (98.8 °F) 66 16 98 % 6' 1\" (1.854 m) 125 kg (275 lb 9.2 oz) Temp (24hrs), Av.1 °C (98.8 °F), Min:37.1 °C (98.8 °F), Max:37.1 °C (98.8 °F) General appearance: alert, cooperative, no distress, appears stated age Airway: Mallampati 2, dentition - upper denture, lower intact, oral aperture adequate, neck full range of motion. Neck: symmetrical, trachea midline, no JVD Lungs: clear to auscultation bilaterally Heart: regular rhythm and normal rate, no murmur. Skin:normal 
Neurologic: Grossly normal 
 
Labs:  
Recent Results (from the past 24 hour(s)) CBC WITH AUTOMATED DIFF Collection Time: 10/04/18  2:12 PM  
Result Value Ref Range WBC 4.9 4.1 - 11.1 K/uL  
 RBC 4.53 4.10 - 5.70 M/uL  
 HGB 13.3 12.1 - 17.0 g/dL HCT 38.5 36.6 - 50.3 % MCV 85.0 80.0 - 99.0 FL  
 MCH 29.4 26.0 - 34.0 PG  
 MCHC 34.5 30.0 - 36.5 g/dL  
 RDW 12.9 11.5 - 14.5 % PLATELET 271 428 - 251 K/uL MPV 9.7 8.9 - 12.9 FL  
 NRBC 0.0 0  WBC ABSOLUTE NRBC 0.00 0.00 - 0.01 K/uL NEUTROPHILS 58 32 - 75 % LYMPHOCYTES 30 12 - 49 % MONOCYTES 9 5 - 13 % EOSINOPHILS 1 0 - 7 % BASOPHILS 1 0 - 1 % IMMATURE GRANULOCYTES 0 0.0 - 0.5 % ABS. NEUTROPHILS 2.8 1.8 - 8.0 K/UL  
 ABS. LYMPHOCYTES 1.5 0.8 - 3.5 K/UL  
 ABS. MONOCYTES 0.5 0.0 - 1.0 K/UL  
 ABS. EOSINOPHILS 0.1 0.0 - 0.4 K/UL  
 ABS. BASOPHILS 0.0 0.0 - 0.1 K/UL  
 ABS. IMM. GRANS. 0.0 0.00 - 0.04 K/UL  
 DF AUTOMATED METABOLIC PANEL, COMPREHENSIVE Collection Time: 10/04/18  2:12 PM  
Result Value Ref Range Sodium 139 136 - 145 mmol/L Potassium 4.0 3.5 - 5.1 mmol/L Chloride 105 97 - 108 mmol/L  
 CO2 26 21 - 32 mmol/L Anion gap 8 5 - 15 mmol/L Glucose 170 (H) 65 - 100 mg/dL BUN 11 6 - 20 MG/DL Creatinine 0.86 0.70 - 1.30 MG/DL  
 BUN/Creatinine ratio 13 12 - 20 GFR est AA >60 >60 ml/min/1.73m2 GFR est non-AA >60 >60 ml/min/1.73m2 Calcium 8.8 8.5 - 10.1 MG/DL Bilirubin, total 0.5 0.2 - 1.0 MG/DL  
 ALT (SGPT) 44 12 - 78 U/L  
 AST (SGOT) 30 15 - 37 U/L Alk. phosphatase 103 45 - 117 U/L Protein, total 7.4 6.4 - 8.2 g/dL Albumin 4.2 3.5 - 5.0 g/dL Globulin 3.2 2.0 - 4.0 g/dL A-G Ratio 1.3 1.1 - 2.2 PROTHROMBIN TIME + INR Collection Time: 10/04/18  2:12 PM  
Result Value Ref Range INR 1.0 0.9 - 1.1 Prothrombin time 9.8 9.0 - 11.1 sec PTT Collection Time: 10/04/18  2:12 PM  
Result Value Ref Range aPTT 28.5 22.1 - 32.0 sec  
 aPTT, therapeutic range     58.0 - 77.0 SECS  
URINALYSIS W/MICROSCOPIC Collection Time: 10/04/18  2:12 PM  
Result Value Ref Range Color YELLOW/STRAW Appearance CLEAR CLEAR Specific gravity 1.008 1.003 - 1.030    
 pH (UA) 5.5 5.0 - 8.0 Protein NEGATIVE  NEG mg/dL Glucose NEGATIVE  NEG mg/dL Ketone NEGATIVE  NEG mg/dL Bilirubin NEGATIVE  NEG Blood NEGATIVE  NEG Urobilinogen 1.0 0.2 - 1.0 EU/dL Nitrites NEGATIVE  NEG Leukocyte Esterase NEGATIVE  NEG    
 WBC 0-4 0 - 4 /hpf  
 RBC 0-5 0 - 5 /hpf Epithelial cells FEW FEW /lpf Bacteria NEGATIVE  NEG /hpf Hyaline cast 0-2 0 - 5 /lpf NT-PRO BNP Collection Time: 10/04/18  2:12 PM  
Result Value Ref Range NT pro-BNP 13 0 - 125 PG/ML  
TSH 3RD GENERATION Collection Time: 10/04/18  2:12 PM  
Result Value Ref Range TSH 1.66 0.36 - 3.74 uIU/mL EKG, 12 LEAD, INITIAL Collection Time: 10/04/18  2:25 PM  
Result Value Ref Range Ventricular Rate 64 BPM  
 Atrial Rate 64 BPM  
 P-R Interval 160 ms QRS Duration 96 ms  
 Q-T Interval 396 ms QTC Calculation (Bezet) 408 ms Calculated P Axis 39 degrees Calculated R Axis 9 degrees Calculated T Axis 38 degrees Diagnosis Normal sinus rhythm Normal ECG When compared with ECG of 05-FEB-2014 08:50, No significant change was found Assessment:  
No apparent contraindications for general anesthesia with intubation,TRACI or catheters for proposed surgical procedure. Active Problems: * No active hospital problems. * 
 
 
ASA 4 Plan/Recommendations/Medical Decision Making: Anesthetic Plan: General anesthesia with intubation Risks and benefits of the anesthetic plan including but not limited to intra and postoperative intubation, invasive monitoring, catheter placement, and TRACI discussed and agreed upon by the patient. All questions answered. Definitive plan pending day of surgery. Signed By: Ev Renee MD 
Date:           10/4/2018 Time:          3:59 PM

## 2018-10-05 LAB
BACTERIA SPEC CULT: NORMAL
BACTERIA SPEC CULT: NORMAL
SERVICE CMNT-IMP: NORMAL

## 2018-10-05 PROCEDURE — 74011250636 HC RX REV CODE- 250/636

## 2018-10-05 RX ORDER — MIDAZOLAM HYDROCHLORIDE 1 MG/ML
INJECTION, SOLUTION INTRAMUSCULAR; INTRAVENOUS
Status: DISCONTINUED
Start: 2018-10-05 | End: 2018-10-08 | Stop reason: HOSPADM

## 2018-10-05 RX ORDER — LIDOCAINE HYDROCHLORIDE 10 MG/ML
INJECTION, SOLUTION EPIDURAL; INFILTRATION; INTRACAUDAL; PERINEURAL
Status: DISCONTINUED
Start: 2018-10-05 | End: 2018-10-08 | Stop reason: HOSPADM

## 2018-10-05 RX ORDER — FENTANYL CITRATE 50 UG/ML
INJECTION, SOLUTION INTRAMUSCULAR; INTRAVENOUS
Status: DISCONTINUED
Start: 2018-10-05 | End: 2018-10-08 | Stop reason: HOSPADM

## 2018-10-05 RX ORDER — HEPARIN SODIUM 200 [USP'U]/100ML
INJECTION, SOLUTION INTRAVENOUS
Status: DISCONTINUED
Start: 2018-10-05 | End: 2018-10-08 | Stop reason: HOSPADM

## 2018-10-05 NOTE — PROCEDURES
[address] 
2D ECHOCARDIOGRAM 
 
Name:CULELN BAER SR MR#: [id number] : 1960 ACCOUNT #: [de-identified] DATE OF SERVICE: 10/02/2018 REASON FOR TEST:  Dyspnea with exertion. Spirometry was performed and it reveals: 1. No airflow obstruction. 2.  Normal FVC. 3.  Normal flow volume loop. Vera Whitman MD 
  
 
ERG / SN 
D: 10/05/2018 13:58    
T: 10/05/2018 18:59 
JOB #: [id number] CC: Luiz Loomis MD

## 2018-10-06 LAB
BACTERIA SPEC CULT: NORMAL
CC UR VC: NORMAL
SERVICE CMNT-IMP: NORMAL

## 2018-10-07 RX ORDER — PROTAMINE SULFATE 10 MG/ML
250 INJECTION, SOLUTION INTRAVENOUS
Status: DISCONTINUED | OUTPATIENT
Start: 2018-10-08 | End: 2018-10-08

## 2018-10-07 RX ORDER — DESMOPRESSIN ACETATE 4 UG/ML
2 INJECTION, SOLUTION INTRAVENOUS; SUBCUTANEOUS ONCE
Status: DISCONTINUED | OUTPATIENT
Start: 2018-10-08 | End: 2018-10-08

## 2018-10-07 RX ORDER — NITROGLYCERIN 20 MG/100ML
0-200 INJECTION INTRAVENOUS
Status: DISCONTINUED | OUTPATIENT
Start: 2018-10-08 | End: 2018-10-08

## 2018-10-07 RX ORDER — MAGNESIUM SULFATE HEPTAHYDRATE 40 MG/ML
2 INJECTION, SOLUTION INTRAVENOUS ONCE
Status: DISCONTINUED | OUTPATIENT
Start: 2018-10-08 | End: 2018-10-08

## 2018-10-07 RX ORDER — POTASSIUM CHLORIDE 29.8 MG/ML
20 INJECTION INTRAVENOUS ONCE
Status: DISCONTINUED | OUTPATIENT
Start: 2018-10-08 | End: 2018-10-08

## 2018-10-07 RX ORDER — DOPAMINE HYDROCHLORIDE 320 MG/100ML
5-20 INJECTION, SOLUTION INTRAVENOUS
Status: DISCONTINUED | OUTPATIENT
Start: 2018-10-08 | End: 2018-10-08

## 2018-10-07 RX ORDER — DOBUTAMINE HYDROCHLORIDE 200 MG/100ML
0-10 INJECTION INTRAVENOUS
Status: DISCONTINUED | OUTPATIENT
Start: 2018-10-08 | End: 2018-10-08

## 2018-10-08 ENCOUNTER — HOSPITAL ENCOUNTER (INPATIENT)
Age: 58
LOS: 4 days | Discharge: HOME HEALTH | DRG: 236 | End: 2018-10-12
Attending: THORACIC SURGERY (CARDIOTHORACIC VASCULAR SURGERY) | Admitting: THORACIC SURGERY (CARDIOTHORACIC VASCULAR SURGERY)
Payer: COMMERCIAL

## 2018-10-08 ENCOUNTER — ANESTHESIA (OUTPATIENT)
Dept: CARDIOTHORACIC SURGERY | Age: 58
DRG: 236 | End: 2018-10-08
Payer: COMMERCIAL

## 2018-10-08 ENCOUNTER — APPOINTMENT (OUTPATIENT)
Dept: GENERAL RADIOLOGY | Age: 58
DRG: 236 | End: 2018-10-08
Attending: PHYSICIAN ASSISTANT
Payer: COMMERCIAL

## 2018-10-08 DIAGNOSIS — I25.118 CORONARY ARTERY DISEASE OF NATIVE ARTERY OF NATIVE HEART WITH STABLE ANGINA PECTORIS (HCC): ICD-10-CM

## 2018-10-08 DIAGNOSIS — Z95.1 S/P CABG X 4: Primary | ICD-10-CM

## 2018-10-08 PROBLEM — I25.10 CAD (CORONARY ARTERY DISEASE), NATIVE CORONARY ARTERY: Status: ACTIVE | Noted: 2018-10-08

## 2018-10-08 LAB
ADMINISTERED INITIALS, ADMINIT: NORMAL
ALBUMIN SERPL-MCNC: 3.7 G/DL (ref 3.5–5)
ALBUMIN SERPL-MCNC: 4.1 G/DL (ref 3.5–5)
ALBUMIN/GLOB SERPL: 2.1 {RATIO} (ref 1.1–2.2)
ALBUMIN/GLOB SERPL: 2.2 {RATIO} (ref 1.1–2.2)
ALP SERPL-CCNC: 56 U/L (ref 45–117)
ALP SERPL-CCNC: 62 U/L (ref 45–117)
ALT SERPL-CCNC: 30 U/L (ref 12–78)
ALT SERPL-CCNC: 37 U/L (ref 12–78)
ANION GAP SERPL CALC-SCNC: 5 MMOL/L (ref 5–15)
ANION GAP SERPL CALC-SCNC: 6 MMOL/L (ref 5–15)
APTT PPP: 27.3 SEC (ref 22.1–32)
ARTERIAL PATENCY WRIST A: ABNORMAL
AST SERPL-CCNC: 34 U/L (ref 15–37)
AST SERPL-CCNC: 47 U/L (ref 15–37)
BASE DEFICIT BLDA-SCNC: 1.5 MMOL/L
BASE DEFICIT BLDA-SCNC: 1.9 MMOL/L
BASE DEFICIT BLDA-SCNC: 3 MMOL/L
BASOPHILS # BLD: 0 K/UL (ref 0–0.1)
BASOPHILS NFR BLD: 0 % (ref 0–1)
BDY SITE: ABNORMAL
BILIRUB SERPL-MCNC: 0.7 MG/DL (ref 0.2–1)
BILIRUB SERPL-MCNC: 0.9 MG/DL (ref 0.2–1)
BREATHS.SPONTANEOUS ON VENT: 14
BREATHS.SPONTANEOUS ON VENT: 17
BUN SERPL-MCNC: 11 MG/DL (ref 6–20)
BUN SERPL-MCNC: 13 MG/DL (ref 6–20)
BUN/CREAT SERPL: 11 (ref 12–20)
BUN/CREAT SERPL: 13 (ref 12–20)
CA-I BLD-SCNC: 1.13 MMOL/L (ref 1.13–1.32)
CALCIUM SERPL-MCNC: 7.6 MG/DL (ref 8.5–10.1)
CALCIUM SERPL-MCNC: 8.6 MG/DL (ref 8.5–10.1)
CHLORIDE SERPL-SCNC: 111 MMOL/L (ref 97–108)
CHLORIDE SERPL-SCNC: 112 MMOL/L (ref 97–108)
CO2 SERPL-SCNC: 25 MMOL/L (ref 21–32)
CO2 SERPL-SCNC: 26 MMOL/L (ref 21–32)
CREAT SERPL-MCNC: 0.99 MG/DL (ref 0.7–1.3)
CREAT SERPL-MCNC: 1.01 MG/DL (ref 0.7–1.3)
D50 ADMINISTERED, D50ADM: 0 ML
D50 ORDER, D50ORD: 0 ML
DIFFERENTIAL METHOD BLD: ABNORMAL
EOSINOPHIL # BLD: 0 K/UL (ref 0–0.4)
EOSINOPHIL NFR BLD: 0 % (ref 0–7)
EPAP/CPAP/PEEP, PAPEEP: 6
ERYTHROCYTE [DISTWIDTH] IN BLOOD BY AUTOMATED COUNT: 12.8 % (ref 11.5–14.5)
FIO2 ON VENT: 100 %
FIO2 ON VENT: 30 %
FIO2 ON VENT: 60 %
GAS FLOW.O2 SETTING OXYMISER: 12 L/MIN
GLOBULIN SER CALC-MCNC: 1.8 G/DL (ref 2–4)
GLOBULIN SER CALC-MCNC: 1.9 G/DL (ref 2–4)
GLSCOM COMMENTS: NORMAL
GLUCOSE BLD STRIP.AUTO-MCNC: 110 MG/DL (ref 65–100)
GLUCOSE BLD STRIP.AUTO-MCNC: 115 MG/DL (ref 65–100)
GLUCOSE BLD STRIP.AUTO-MCNC: 116 MG/DL (ref 65–100)
GLUCOSE BLD STRIP.AUTO-MCNC: 128 MG/DL (ref 65–100)
GLUCOSE BLD STRIP.AUTO-MCNC: 129 MG/DL (ref 65–100)
GLUCOSE BLD STRIP.AUTO-MCNC: 130 MG/DL (ref 65–100)
GLUCOSE BLD STRIP.AUTO-MCNC: 142 MG/DL (ref 65–100)
GLUCOSE BLD STRIP.AUTO-MCNC: 185 MG/DL (ref 65–100)
GLUCOSE SERPL-MCNC: 127 MG/DL (ref 65–100)
GLUCOSE SERPL-MCNC: 129 MG/DL (ref 65–100)
GLUCOSE, GLC: 110 MG/DL
GLUCOSE, GLC: 115 MG/DL
GLUCOSE, GLC: 116 MG/DL
GLUCOSE, GLC: 117 MG/DL
GLUCOSE, GLC: 128 MG/DL
GLUCOSE, GLC: 129 MG/DL
GLUCOSE, GLC: 130 MG/DL
GLUCOSE, GLC: 130 MG/DL
GLUCOSE, GLC: 131 MG/DL
GLUCOSE, GLC: 141 MG/DL
GLUCOSE, GLC: 142 MG/DL
GLUCOSE, GLC: 148 MG/DL
GLUCOSE, GLC: 157 MG/DL
GLUCOSE, GLC: 172 MG/DL
GLUCOSE, GLC: 184 MG/DL
HCO3 BLDA-SCNC: 22 MMOL/L (ref 22–26)
HCO3 BLDA-SCNC: 24 MMOL/L (ref 22–26)
HCO3 BLDA-SCNC: 25 MMOL/L (ref 22–26)
HCT VFR BLD AUTO: 31.2 % (ref 36.6–50.3)
HCT VFR BLD AUTO: 32.6 % (ref 36.6–50.3)
HGB BLD-MCNC: 10.7 G/DL (ref 12.1–17)
HGB BLD-MCNC: 11.1 G/DL (ref 12.1–17)
HIGH TARGET, HITG: 130 MG/DL
HIGH TARGET, HITG: 140 MG/DL
IMM GRANULOCYTES # BLD: 0 K/UL (ref 0–0.04)
IMM GRANULOCYTES NFR BLD AUTO: 0 % (ref 0–0.5)
INR PPP: 1.2 (ref 0.9–1.1)
INSULIN ADMINSTERED, INSADM: 3.7 UNITS/HOUR
INSULIN ADMINSTERED, INSADM: 4 UNITS/HOUR
INSULIN ADMINSTERED, INSADM: 4 UNITS/HOUR
INSULIN ADMINSTERED, INSADM: 4.4 UNITS/HOUR
INSULIN ADMINSTERED, INSADM: 4.5 UNITS/HOUR
INSULIN ADMINSTERED, INSADM: 4.5 UNITS/HOUR
INSULIN ADMINSTERED, INSADM: 4.9 UNITS/HOUR
INSULIN ADMINSTERED, INSADM: 4.9 UNITS/HOUR
INSULIN ADMINSTERED, INSADM: 5 UNITS/HOUR
INSULIN ADMINSTERED, INSADM: 5.3 UNITS/HOUR
INSULIN ADMINSTERED, INSADM: 5.4 UNITS/HOUR
INSULIN ADMINSTERED, INSADM: 5.5 UNITS/HOUR
INSULIN ADMINSTERED, INSADM: 5.6 UNITS/HOUR
INSULIN ADMINSTERED, INSADM: 5.7 UNITS/HOUR
INSULIN ADMINSTERED, INSADM: 6.6 UNITS/HOUR
INSULIN ORDER, INSORD: 3.7 UNITS/HOUR
INSULIN ORDER, INSORD: 4 UNITS/HOUR
INSULIN ORDER, INSORD: 4 UNITS/HOUR
INSULIN ORDER, INSORD: 4.4 UNITS/HOUR
INSULIN ORDER, INSORD: 4.5 UNITS/HOUR
INSULIN ORDER, INSORD: 4.5 UNITS/HOUR
INSULIN ORDER, INSORD: 4.9 UNITS/HOUR
INSULIN ORDER, INSORD: 4.9 UNITS/HOUR
INSULIN ORDER, INSORD: 5 UNITS/HOUR
INSULIN ORDER, INSORD: 5.3 UNITS/HOUR
INSULIN ORDER, INSORD: 5.4 UNITS/HOUR
INSULIN ORDER, INSORD: 5.5 UNITS/HOUR
INSULIN ORDER, INSORD: 5.6 UNITS/HOUR
INSULIN ORDER, INSORD: 5.7 UNITS/HOUR
INSULIN ORDER, INSORD: 6.6 UNITS/HOUR
LOW TARGET, LOT: 100 MG/DL
LOW TARGET, LOT: 95 MG/DL
LYMPHOCYTES # BLD: 0.5 K/UL (ref 0.8–3.5)
LYMPHOCYTES NFR BLD: 5 % (ref 12–49)
MAGNESIUM SERPL-MCNC: 2 MG/DL (ref 1.6–2.4)
MAGNESIUM SERPL-MCNC: 2.1 MG/DL (ref 1.6–2.4)
MCH RBC QN AUTO: 29.5 PG (ref 26–34)
MCHC RBC AUTO-ENTMCNC: 34.3 G/DL (ref 30–36.5)
MCV RBC AUTO: 86 FL (ref 80–99)
METAMYELOCYTES NFR BLD MANUAL: 1 %
MINUTES UNTIL NEXT BG, NBG: 120 MIN
MINUTES UNTIL NEXT BG, NBG: 60 MIN
MONOCYTES # BLD: 0.2 K/UL (ref 0–1)
MONOCYTES NFR BLD: 2 % (ref 5–13)
MULTIPLIER, MUL: 0.03
MULTIPLIER, MUL: 0.04
MULTIPLIER, MUL: 0.05
MULTIPLIER, MUL: 0.06
MULTIPLIER, MUL: 0.07
MULTIPLIER, MUL: 0.08
NEUTS BAND NFR BLD MANUAL: 7 %
NEUTS SEG # BLD: 8.9 K/UL (ref 1.8–8)
NEUTS SEG NFR BLD: 85 % (ref 32–75)
NRBC # BLD: 0 K/UL (ref 0–0.01)
NRBC BLD-RTO: 0 PER 100 WBC
ORDER INITIALS, ORDINIT: NORMAL
PCO2 BLDA: 40 MMHG (ref 35–45)
PCO2 BLDA: 44 MMHG (ref 35–45)
PCO2 BLDA: 47 MMHG (ref 35–45)
PH BLDA: 7.34 [PH] (ref 7.35–7.45)
PH BLDA: 7.35 [PH] (ref 7.35–7.45)
PH BLDA: 7.36 [PH] (ref 7.35–7.45)
PLATELET # BLD AUTO: 130 K/UL (ref 150–400)
PMV BLD AUTO: 9.7 FL (ref 8.9–12.9)
PO2 BLDA: 137 MMHG (ref 80–100)
PO2 BLDA: 60 MMHG (ref 80–100)
PO2 BLDA: 75 MMHG (ref 80–100)
POTASSIUM SERPL-SCNC: 4.1 MMOL/L (ref 3.5–5.1)
POTASSIUM SERPL-SCNC: 4.6 MMOL/L (ref 3.5–5.1)
PRESSURE SUPPORT SETTING VENT: 10 CM[H2O]
PROT SERPL-MCNC: 5.5 G/DL (ref 6.4–8.2)
PROT SERPL-MCNC: 6 G/DL (ref 6.4–8.2)
PROTHROMBIN TIME: 11.8 SEC (ref 9–11.1)
RBC # BLD AUTO: 3.63 M/UL (ref 4.1–5.7)
RBC MORPH BLD: ABNORMAL
SAO2 % BLD: 90 % (ref 92–97)
SAO2 % BLD: 94 % (ref 92–97)
SAO2 % BLD: 99 % (ref 92–97)
SAO2% DEVICE SAO2% SENSOR NAME: ABNORMAL
SERVICE CMNT-IMP: ABNORMAL
SODIUM SERPL-SCNC: 142 MMOL/L (ref 136–145)
SODIUM SERPL-SCNC: 143 MMOL/L (ref 136–145)
SPECIMEN SITE: ABNORMAL
THERAPEUTIC RANGE,PTTT: NORMAL SECS (ref 58–77)
VENTILATION MODE VENT: ABNORMAL
VT SETTING VENT: 650 ML
WBC # BLD AUTO: 9.7 K/UL (ref 4.1–11.1)

## 2018-10-08 PROCEDURE — 93005 ELECTROCARDIOGRAM TRACING: CPT

## 2018-10-08 PROCEDURE — 74011636637 HC RX REV CODE- 636/637: Performed by: THORACIC SURGERY (CARDIOTHORACIC VASCULAR SURGERY)

## 2018-10-08 PROCEDURE — 74011000250 HC RX REV CODE- 250: Performed by: THORACIC SURGERY (CARDIOTHORACIC VASCULAR SURGERY)

## 2018-10-08 PROCEDURE — 77030013904 HC PRB VASC PARSONT IMPR -B: Performed by: THORACIC SURGERY (CARDIOTHORACIC VASCULAR SURGERY)

## 2018-10-08 PROCEDURE — 74011000258 HC RX REV CODE- 258: Performed by: THORACIC SURGERY (CARDIOTHORACIC VASCULAR SURGERY)

## 2018-10-08 PROCEDURE — 77030011640 HC PAD GRND REM COVD -A: Performed by: THORACIC SURGERY (CARDIOTHORACIC VASCULAR SURGERY)

## 2018-10-08 PROCEDURE — 77030018836 HC SOL IRR NACL ICUM -A: Performed by: THORACIC SURGERY (CARDIOTHORACIC VASCULAR SURGERY)

## 2018-10-08 PROCEDURE — 77030006247 HC LD PCMKR MYOCRD BPLR TEMP MEDT -B: Performed by: THORACIC SURGERY (CARDIOTHORACIC VASCULAR SURGERY)

## 2018-10-08 PROCEDURE — 76060000044 HC ANESTHESIA 6.5 TO 7 HR: Performed by: THORACIC SURGERY (CARDIOTHORACIC VASCULAR SURGERY)

## 2018-10-08 PROCEDURE — 77030037878 HC DRSG MEPILEX >48IN BORD MOLN -B

## 2018-10-08 PROCEDURE — 74011000250 HC RX REV CODE- 250

## 2018-10-08 PROCEDURE — 77030010938 HC CLP LIG TELE -A: Performed by: THORACIC SURGERY (CARDIOTHORACIC VASCULAR SURGERY)

## 2018-10-08 PROCEDURE — 77030006921 HC BLD STRNL SAW TERU -B: Performed by: THORACIC SURGERY (CARDIOTHORACIC VASCULAR SURGERY)

## 2018-10-08 PROCEDURE — 74011250637 HC RX REV CODE- 250/637: Performed by: PHYSICIAN ASSISTANT

## 2018-10-08 PROCEDURE — 83735 ASSAY OF MAGNESIUM: CPT | Performed by: PHYSICIAN ASSISTANT

## 2018-10-08 PROCEDURE — 77030013861 HC PNCH AORT CLNCUT QUES -B: Performed by: THORACIC SURGERY (CARDIOTHORACIC VASCULAR SURGERY)

## 2018-10-08 PROCEDURE — 77030008771 HC TU NG SALEM SUMP -A: Performed by: THORACIC SURGERY (CARDIOTHORACIC VASCULAR SURGERY)

## 2018-10-08 PROCEDURE — 77030002933 HC SUT MCRYL J&J -A: Performed by: THORACIC SURGERY (CARDIOTHORACIC VASCULAR SURGERY)

## 2018-10-08 PROCEDURE — 80053 COMPREHEN METABOLIC PANEL: CPT | Performed by: PHYSICIAN ASSISTANT

## 2018-10-08 PROCEDURE — 85025 COMPLETE CBC W/AUTO DIFF WBC: CPT | Performed by: PHYSICIAN ASSISTANT

## 2018-10-08 PROCEDURE — 85018 HEMOGLOBIN: CPT | Performed by: PHYSICIAN ASSISTANT

## 2018-10-08 PROCEDURE — 77030011256 HC DRSG MEPILEX <16IN NO BORD MOLN -A: Performed by: THORACIC SURGERY (CARDIOTHORACIC VASCULAR SURGERY)

## 2018-10-08 PROCEDURE — 74011636637 HC RX REV CODE- 636/637

## 2018-10-08 PROCEDURE — 77030003010 HC SUT SURG STL J&J -B: Performed by: THORACIC SURGERY (CARDIOTHORACIC VASCULAR SURGERY)

## 2018-10-08 PROCEDURE — 77030006689 HC BLD OPHTH BVR BD -A: Performed by: THORACIC SURGERY (CARDIOTHORACIC VASCULAR SURGERY)

## 2018-10-08 PROCEDURE — 77030022199 HC SYS HARV VESL GTNG -G1: Performed by: THORACIC SURGERY (CARDIOTHORACIC VASCULAR SURGERY)

## 2018-10-08 PROCEDURE — 74011250636 HC RX REV CODE- 250/636

## 2018-10-08 PROCEDURE — 021109W BYPASS CORONARY ARTERY, TWO ARTERIES FROM AORTA WITH AUTOLOGOUS VENOUS TISSUE, OPEN APPROACH: ICD-10-PCS | Performed by: THORACIC SURGERY (CARDIOTHORACIC VASCULAR SURGERY)

## 2018-10-08 PROCEDURE — 77030014650 HC SEAL MTRX FLOSEL BAXT -C: Performed by: THORACIC SURGERY (CARDIOTHORACIC VASCULAR SURGERY)

## 2018-10-08 PROCEDURE — 77030010797: Performed by: THORACIC SURGERY (CARDIOTHORACIC VASCULAR SURGERY)

## 2018-10-08 PROCEDURE — 77030012390 HC DRN CHST BTL GTNG -B: Performed by: THORACIC SURGERY (CARDIOTHORACIC VASCULAR SURGERY)

## 2018-10-08 PROCEDURE — 77030018835 HC SOL IRR LR ICUM -A: Performed by: THORACIC SURGERY (CARDIOTHORACIC VASCULAR SURGERY)

## 2018-10-08 PROCEDURE — 77030008684 HC TU ET CUF COVD -B: Performed by: NURSE ANESTHETIST, CERTIFIED REGISTERED

## 2018-10-08 PROCEDURE — 65620000000 HC RM CCU GENERAL

## 2018-10-08 PROCEDURE — 77030020263 HC SOL INJ SOD CL0.9% LFCR 1000ML: Performed by: THORACIC SURGERY (CARDIOTHORACIC VASCULAR SURGERY)

## 2018-10-08 PROCEDURE — 77030021043 HC PRB VASC PARSONT IMPR -C: Performed by: THORACIC SURGERY (CARDIOTHORACIC VASCULAR SURGERY)

## 2018-10-08 PROCEDURE — 77030010818: Performed by: THORACIC SURGERY (CARDIOTHORACIC VASCULAR SURGERY)

## 2018-10-08 PROCEDURE — 02100Z9 BYPASS CORONARY ARTERY, ONE ARTERY FROM LEFT INTERNAL MAMMARY, OPEN APPROACH: ICD-10-PCS | Performed by: THORACIC SURGERY (CARDIOTHORACIC VASCULAR SURGERY)

## 2018-10-08 PROCEDURE — 77030039266 HC ADH SKN EXOFIN S2SG -A: Performed by: THORACIC SURGERY (CARDIOTHORACIC VASCULAR SURGERY)

## 2018-10-08 PROCEDURE — 77010033678 HC OXYGEN DAILY

## 2018-10-08 PROCEDURE — 77030013798 HC KT TRNSDUC PRSSR EDWD -B: Performed by: NURSE ANESTHETIST, CERTIFIED REGISTERED

## 2018-10-08 PROCEDURE — 77030018846 HC SOL IRR STRL H20 ICUM -A: Performed by: THORACIC SURGERY (CARDIOTHORACIC VASCULAR SURGERY)

## 2018-10-08 PROCEDURE — 77030020256 HC SOL INJ NACL 0.9%  500ML: Performed by: THORACIC SURGERY (CARDIOTHORACIC VASCULAR SURGERY)

## 2018-10-08 PROCEDURE — 82330 ASSAY OF CALCIUM: CPT | Performed by: PHYSICIAN ASSISTANT

## 2018-10-08 PROCEDURE — 77030018390 HC SPNG HEMSTAT2 J&J -B: Performed by: THORACIC SURGERY (CARDIOTHORACIC VASCULAR SURGERY)

## 2018-10-08 PROCEDURE — 77030010389 HC WRE ATR PACE AEMC -B: Performed by: THORACIC SURGERY (CARDIOTHORACIC VASCULAR SURGERY)

## 2018-10-08 PROCEDURE — 77030013798 HC KT TRNSDUC PRSSR EDWD -B: Performed by: THORACIC SURGERY (CARDIOTHORACIC VASCULAR SURGERY)

## 2018-10-08 PROCEDURE — 74011000258 HC RX REV CODE- 258: Performed by: PHYSICIAN ASSISTANT

## 2018-10-08 PROCEDURE — 74011000250 HC RX REV CODE- 250: Performed by: PHYSICIAN ASSISTANT

## 2018-10-08 PROCEDURE — 74011250636 HC RX REV CODE- 250/636: Performed by: PHYSICIAN ASSISTANT

## 2018-10-08 PROCEDURE — 77030002987 HC SUT PROL J&J -B: Performed by: THORACIC SURGERY (CARDIOTHORACIC VASCULAR SURGERY)

## 2018-10-08 PROCEDURE — 77030003020 HC SUT TICRN COVD -A: Performed by: THORACIC SURGERY (CARDIOTHORACIC VASCULAR SURGERY)

## 2018-10-08 PROCEDURE — 77030010605 HC BLWR MR MAL MEDT -B: Performed by: THORACIC SURGERY (CARDIOTHORACIC VASCULAR SURGERY)

## 2018-10-08 PROCEDURE — 77030034848: Performed by: THORACIC SURGERY (CARDIOTHORACIC VASCULAR SURGERY)

## 2018-10-08 PROCEDURE — 5A1221Z PERFORMANCE OF CARDIAC OUTPUT, CONTINUOUS: ICD-10-PCS | Performed by: THORACIC SURGERY (CARDIOTHORACIC VASCULAR SURGERY)

## 2018-10-08 PROCEDURE — P9047 ALBUMIN (HUMAN), 25%, 50ML: HCPCS | Performed by: THORACIC SURGERY (CARDIOTHORACIC VASCULAR SURGERY)

## 2018-10-08 PROCEDURE — 74011250636 HC RX REV CODE- 250/636: Performed by: THORACIC SURGERY (CARDIOTHORACIC VASCULAR SURGERY)

## 2018-10-08 PROCEDURE — 71045 X-RAY EXAM CHEST 1 VIEW: CPT

## 2018-10-08 PROCEDURE — P9045 ALBUMIN (HUMAN), 5%, 250 ML: HCPCS

## 2018-10-08 PROCEDURE — C1751 CATH, INF, PER/CENT/MIDLINE: HCPCS | Performed by: NURSE ANESTHETIST, CERTIFIED REGISTERED

## 2018-10-08 PROCEDURE — 77030002912 HC SUT ETHBND J&J -A: Performed by: THORACIC SURGERY (CARDIOTHORACIC VASCULAR SURGERY)

## 2018-10-08 PROCEDURE — 77030019908 HC STETH ESOPH SIMS -A: Performed by: NURSE ANESTHETIST, CERTIFIED REGISTERED

## 2018-10-08 PROCEDURE — 77030005401 HC CATH RAD ARRO -A: Performed by: NURSE ANESTHETIST, CERTIFIED REGISTERED

## 2018-10-08 PROCEDURE — 77030020167 HC PERF SET MULT MEDT -B: Performed by: THORACIC SURGERY (CARDIOTHORACIC VASCULAR SURGERY)

## 2018-10-08 PROCEDURE — 77030011255 HC DSG AQUACEL AG BMS -A: Performed by: THORACIC SURGERY (CARDIOTHORACIC VASCULAR SURGERY)

## 2018-10-08 PROCEDURE — 77030010813: Performed by: THORACIC SURGERY (CARDIOTHORACIC VASCULAR SURGERY)

## 2018-10-08 PROCEDURE — 36415 COLL VENOUS BLD VENIPUNCTURE: CPT | Performed by: PHYSICIAN ASSISTANT

## 2018-10-08 PROCEDURE — 74011000258 HC RX REV CODE- 258

## 2018-10-08 PROCEDURE — 85610 PROTHROMBIN TIME: CPT | Performed by: PHYSICIAN ASSISTANT

## 2018-10-08 PROCEDURE — 94002 VENT MGMT INPAT INIT DAY: CPT

## 2018-10-08 PROCEDURE — 85730 THROMBOPLASTIN TIME PARTIAL: CPT | Performed by: PHYSICIAN ASSISTANT

## 2018-10-08 PROCEDURE — 82803 BLOOD GASES ANY COMBINATION: CPT | Performed by: PHYSICIAN ASSISTANT

## 2018-10-08 PROCEDURE — 77030018729 HC ELECTRD DEFIB PAD CARD -B: Performed by: THORACIC SURGERY (CARDIOTHORACIC VASCULAR SURGERY)

## 2018-10-08 PROCEDURE — 77030026438 HC STYL ET INTUB CARD -A: Performed by: NURSE ANESTHETIST, CERTIFIED REGISTERED

## 2018-10-08 PROCEDURE — 74011636637 HC RX REV CODE- 636/637: Performed by: PHYSICIAN ASSISTANT

## 2018-10-08 PROCEDURE — 77030019579 HC CBL PACE DISP REMG -B: Performed by: THORACIC SURGERY (CARDIOTHORACIC VASCULAR SURGERY)

## 2018-10-08 PROCEDURE — 77030002986 HC SUT PROL J&J -A: Performed by: THORACIC SURGERY (CARDIOTHORACIC VASCULAR SURGERY)

## 2018-10-08 PROCEDURE — 77030014008 HC SPNG HEMSTAT J&J -C: Performed by: THORACIC SURGERY (CARDIOTHORACIC VASCULAR SURGERY)

## 2018-10-08 PROCEDURE — 82962 GLUCOSE BLOOD TEST: CPT

## 2018-10-08 PROCEDURE — 76010000119 HC CV SURG 6.5 TO 7 HR: Performed by: THORACIC SURGERY (CARDIOTHORACIC VASCULAR SURGERY)

## 2018-10-08 PROCEDURE — 06BP4ZZ EXCISION OF RIGHT SAPHENOUS VEIN, PERCUTANEOUS ENDOSCOPIC APPROACH: ICD-10-PCS | Performed by: THORACIC SURGERY (CARDIOTHORACIC VASCULAR SURGERY)

## 2018-10-08 RX ORDER — ACETAMINOPHEN 325 MG/1
650 TABLET ORAL
Status: DISCONTINUED | OUTPATIENT
Start: 2018-10-09 | End: 2018-10-10

## 2018-10-08 RX ORDER — AMOXICILLIN 250 MG
1 CAPSULE ORAL DAILY
Status: DISCONTINUED | OUTPATIENT
Start: 2018-10-09 | End: 2018-10-12 | Stop reason: HOSPADM

## 2018-10-08 RX ORDER — SODIUM CHLORIDE 9 MG/ML
INJECTION, SOLUTION INTRAVENOUS
Status: DISCONTINUED | OUTPATIENT
Start: 2018-10-08 | End: 2018-10-08 | Stop reason: HOSPADM

## 2018-10-08 RX ORDER — SODIUM CHLORIDE 450 MG/100ML
10 INJECTION, SOLUTION INTRAVENOUS CONTINUOUS
Status: DISCONTINUED | OUTPATIENT
Start: 2018-10-08 | End: 2018-10-10

## 2018-10-08 RX ORDER — LANOLIN ALCOHOL/MO/W.PET/CERES
400 CREAM (GRAM) TOPICAL 2 TIMES DAILY
Status: DISCONTINUED | OUTPATIENT
Start: 2018-10-09 | End: 2018-10-12 | Stop reason: HOSPADM

## 2018-10-08 RX ORDER — ROCURONIUM BROMIDE 10 MG/ML
INJECTION, SOLUTION INTRAVENOUS AS NEEDED
Status: DISCONTINUED | OUTPATIENT
Start: 2018-10-08 | End: 2018-10-08 | Stop reason: HOSPADM

## 2018-10-08 RX ORDER — SUFENTANIL CITRATE 50 UG/ML
INJECTION EPIDURAL; INTRAVENOUS
Status: DISCONTINUED | OUTPATIENT
Start: 2018-10-08 | End: 2018-10-08 | Stop reason: HOSPADM

## 2018-10-08 RX ORDER — VECURONIUM BROMIDE FOR INJECTION 1 MG/ML
INJECTION, POWDER, LYOPHILIZED, FOR SOLUTION INTRAVENOUS AS NEEDED
Status: DISCONTINUED | OUTPATIENT
Start: 2018-10-08 | End: 2018-10-08 | Stop reason: HOSPADM

## 2018-10-08 RX ORDER — SUFENTANIL CITRATE 50 UG/ML
INJECTION EPIDURAL; INTRAVENOUS AS NEEDED
Status: DISCONTINUED | OUTPATIENT
Start: 2018-10-08 | End: 2018-10-08 | Stop reason: HOSPADM

## 2018-10-08 RX ORDER — SODIUM CHLORIDE 9 MG/ML
9 INJECTION, SOLUTION INTRAVENOUS CONTINUOUS
Status: DISCONTINUED | OUTPATIENT
Start: 2018-10-08 | End: 2018-10-10

## 2018-10-08 RX ORDER — SODIUM CHLORIDE 0.9 % (FLUSH) 0.9 %
5-10 SYRINGE (ML) INJECTION EVERY 8 HOURS
Status: DISCONTINUED | OUTPATIENT
Start: 2018-10-08 | End: 2018-10-10

## 2018-10-08 RX ORDER — NALOXONE HYDROCHLORIDE 0.4 MG/ML
0.4 INJECTION, SOLUTION INTRAMUSCULAR; INTRAVENOUS; SUBCUTANEOUS AS NEEDED
Status: DISCONTINUED | OUTPATIENT
Start: 2018-10-08 | End: 2018-10-12 | Stop reason: HOSPADM

## 2018-10-08 RX ORDER — ACETAMINOPHEN 10 MG/ML
1000 INJECTION, SOLUTION INTRAVENOUS EVERY 6 HOURS
Status: COMPLETED | OUTPATIENT
Start: 2018-10-08 | End: 2018-10-09

## 2018-10-08 RX ORDER — INSULIN LISPRO 100 [IU]/ML
INJECTION, SOLUTION INTRAVENOUS; SUBCUTANEOUS
Status: DISCONTINUED | OUTPATIENT
Start: 2018-10-10 | End: 2018-10-12 | Stop reason: HOSPADM

## 2018-10-08 RX ORDER — CEFAZOLIN SODIUM/WATER 2 G/20 ML
2 SYRINGE (ML) INTRAVENOUS EVERY 6 HOURS
Status: COMPLETED | OUTPATIENT
Start: 2018-10-08 | End: 2018-10-10

## 2018-10-08 RX ORDER — CEFAZOLIN SODIUM/WATER 2 G/20 ML
2 SYRINGE (ML) INTRAVENOUS
Status: COMPLETED | OUTPATIENT
Start: 2018-10-08 | End: 2018-10-08

## 2018-10-08 RX ORDER — ALBUMIN HUMAN 50 G/1000ML
12.5 SOLUTION INTRAVENOUS
Status: DISCONTINUED | OUTPATIENT
Start: 2018-10-08 | End: 2018-10-10

## 2018-10-08 RX ORDER — NEOSTIGMINE METHYLSULFATE 1 MG/ML
INJECTION INTRAVENOUS AS NEEDED
Status: DISCONTINUED | OUTPATIENT
Start: 2018-10-08 | End: 2018-10-08 | Stop reason: HOSPADM

## 2018-10-08 RX ORDER — SUCCINYLCHOLINE CHLORIDE 20 MG/ML
INJECTION INTRAMUSCULAR; INTRAVENOUS AS NEEDED
Status: DISCONTINUED | OUTPATIENT
Start: 2018-10-08 | End: 2018-10-08 | Stop reason: HOSPADM

## 2018-10-08 RX ORDER — SODIUM CHLORIDE 0.9 % (FLUSH) 0.9 %
5-10 SYRINGE (ML) INJECTION AS NEEDED
Status: DISCONTINUED | OUTPATIENT
Start: 2018-10-08 | End: 2018-10-08

## 2018-10-08 RX ORDER — PROTAMINE SULFATE 10 MG/ML
INJECTION, SOLUTION INTRAVENOUS AS NEEDED
Status: DISCONTINUED | OUTPATIENT
Start: 2018-10-08 | End: 2018-10-08 | Stop reason: HOSPADM

## 2018-10-08 RX ORDER — CHLORHEXIDINE GLUCONATE 1.2 MG/ML
10 RINSE ORAL 2 TIMES DAILY
Status: DISCONTINUED | OUTPATIENT
Start: 2018-10-08 | End: 2018-10-12 | Stop reason: HOSPADM

## 2018-10-08 RX ORDER — AMIODARONE HYDROCHLORIDE 200 MG/1
400 TABLET ORAL 2 TIMES DAILY
Status: DISCONTINUED | OUTPATIENT
Start: 2018-10-09 | End: 2018-10-12 | Stop reason: HOSPADM

## 2018-10-08 RX ORDER — ATORVASTATIN CALCIUM 20 MG/1
20 TABLET, FILM COATED ORAL DAILY
Status: DISCONTINUED | OUTPATIENT
Start: 2018-10-09 | End: 2018-10-12 | Stop reason: HOSPADM

## 2018-10-08 RX ORDER — FENTANYL CITRATE 50 UG/ML
INJECTION, SOLUTION INTRAMUSCULAR; INTRAVENOUS AS NEEDED
Status: DISCONTINUED | OUTPATIENT
Start: 2018-10-08 | End: 2018-10-08 | Stop reason: HOSPADM

## 2018-10-08 RX ORDER — MAGNESIUM SULFATE 100 %
4 CRYSTALS MISCELLANEOUS AS NEEDED
Status: DISCONTINUED | OUTPATIENT
Start: 2018-10-08 | End: 2018-10-12 | Stop reason: HOSPADM

## 2018-10-08 RX ORDER — LIDOCAINE HYDROCHLORIDE 20 MG/ML
INJECTION, SOLUTION EPIDURAL; INFILTRATION; INTRACAUDAL; PERINEURAL AS NEEDED
Status: DISCONTINUED | OUTPATIENT
Start: 2018-10-08 | End: 2018-10-08 | Stop reason: HOSPADM

## 2018-10-08 RX ORDER — GLYCOPYRROLATE 0.2 MG/ML
INJECTION INTRAMUSCULAR; INTRAVENOUS AS NEEDED
Status: DISCONTINUED | OUTPATIENT
Start: 2018-10-08 | End: 2018-10-08 | Stop reason: HOSPADM

## 2018-10-08 RX ORDER — CEFAZOLIN SODIUM 1 G/3ML
1 INJECTION, POWDER, FOR SOLUTION INTRAMUSCULAR; INTRAVENOUS ONCE
Status: COMPLETED | OUTPATIENT
Start: 2018-10-08 | End: 2018-10-08

## 2018-10-08 RX ORDER — SODIUM CHLORIDE 0.9 % (FLUSH) 0.9 %
5-10 SYRINGE (ML) INJECTION EVERY 8 HOURS
Status: DISCONTINUED | OUTPATIENT
Start: 2018-10-08 | End: 2018-10-08

## 2018-10-08 RX ORDER — MUPIROCIN 20 MG/G
OINTMENT TOPICAL 2 TIMES DAILY
Status: DISCONTINUED | OUTPATIENT
Start: 2018-10-08 | End: 2018-10-12 | Stop reason: HOSPADM

## 2018-10-08 RX ORDER — PAPAVERINE HYDROCHLORIDE 30 MG/ML
30 INJECTION INTRAMUSCULAR; INTRAVENOUS ONCE
Status: COMPLETED | OUTPATIENT
Start: 2018-10-08 | End: 2018-10-08

## 2018-10-08 RX ORDER — POLYETHYLENE GLYCOL 3350 17 G/17G
34 POWDER, FOR SOLUTION ORAL DAILY
Status: DISCONTINUED | OUTPATIENT
Start: 2018-10-09 | End: 2018-10-12 | Stop reason: HOSPADM

## 2018-10-08 RX ORDER — HEPARIN SODIUM 1000 [USP'U]/ML
INJECTION, SOLUTION INTRAVENOUS; SUBCUTANEOUS AS NEEDED
Status: DISCONTINUED | OUTPATIENT
Start: 2018-10-08 | End: 2018-10-08 | Stop reason: HOSPADM

## 2018-10-08 RX ORDER — MORPHINE SULFATE 2 MG/ML
1-2 INJECTION, SOLUTION INTRAMUSCULAR; INTRAVENOUS
Status: DISCONTINUED | OUTPATIENT
Start: 2018-10-08 | End: 2018-10-10

## 2018-10-08 RX ORDER — OXYCODONE HYDROCHLORIDE 5 MG/1
5-10 TABLET ORAL
Status: DISCONTINUED | OUTPATIENT
Start: 2018-10-08 | End: 2018-10-12 | Stop reason: HOSPADM

## 2018-10-08 RX ORDER — MAGNESIUM SULFATE 1 G/100ML
1 INJECTION INTRAVENOUS DAILY
Status: DISCONTINUED | OUTPATIENT
Start: 2018-10-09 | End: 2018-10-10 | Stop reason: ALTCHOICE

## 2018-10-08 RX ORDER — INSULIN LISPRO 100 [IU]/ML
INJECTION, SOLUTION INTRAVENOUS; SUBCUTANEOUS
Status: ACTIVE | OUTPATIENT
Start: 2018-10-09 | End: 2018-10-10

## 2018-10-08 RX ORDER — ONDANSETRON 2 MG/ML
4 INJECTION INTRAMUSCULAR; INTRAVENOUS
Status: DISCONTINUED | OUTPATIENT
Start: 2018-10-08 | End: 2018-10-12 | Stop reason: HOSPADM

## 2018-10-08 RX ORDER — SODIUM CHLORIDE 0.9 % (FLUSH) 0.9 %
5-10 SYRINGE (ML) INJECTION AS NEEDED
Status: DISCONTINUED | OUTPATIENT
Start: 2018-10-08 | End: 2018-10-10

## 2018-10-08 RX ORDER — GUAIFENESIN 100 MG/5ML
81 LIQUID (ML) ORAL DAILY
Status: DISCONTINUED | OUTPATIENT
Start: 2018-10-09 | End: 2018-10-12 | Stop reason: HOSPADM

## 2018-10-08 RX ORDER — MAGNESIUM SULFATE 1 G/100ML
1 INJECTION INTRAVENOUS ONCE
Status: COMPLETED | OUTPATIENT
Start: 2018-10-08 | End: 2018-10-11

## 2018-10-08 RX ORDER — DOBUTAMINE HYDROCHLORIDE 200 MG/100ML
2.5-1 INJECTION INTRAVENOUS
Status: DISCONTINUED | OUTPATIENT
Start: 2018-10-08 | End: 2018-10-09

## 2018-10-08 RX ORDER — MIDAZOLAM HYDROCHLORIDE 1 MG/ML
INJECTION, SOLUTION INTRAMUSCULAR; INTRAVENOUS AS NEEDED
Status: DISCONTINUED | OUTPATIENT
Start: 2018-10-08 | End: 2018-10-08 | Stop reason: HOSPADM

## 2018-10-08 RX ORDER — SODIUM CHLORIDE, SODIUM LACTATE, POTASSIUM CHLORIDE, CALCIUM CHLORIDE 600; 310; 30; 20 MG/100ML; MG/100ML; MG/100ML; MG/100ML
25 INJECTION, SOLUTION INTRAVENOUS CONTINUOUS
Status: DISCONTINUED | OUTPATIENT
Start: 2018-10-08 | End: 2018-10-08

## 2018-10-08 RX ORDER — MORPHINE SULFATE 10 MG/ML
INJECTION, SOLUTION INTRAMUSCULAR; INTRAVENOUS AS NEEDED
Status: DISCONTINUED | OUTPATIENT
Start: 2018-10-08 | End: 2018-10-08 | Stop reason: HOSPADM

## 2018-10-08 RX ORDER — ZOLPIDEM TARTRATE 5 MG/1
5 TABLET ORAL
Status: DISCONTINUED | OUTPATIENT
Start: 2018-10-08 | End: 2018-10-08

## 2018-10-08 RX ORDER — DESMOPRESSIN ACETATE 4 UG/ML
INJECTION, SOLUTION INTRAVENOUS; SUBCUTANEOUS AS NEEDED
Status: DISCONTINUED | OUTPATIENT
Start: 2018-10-08 | End: 2018-10-08 | Stop reason: HOSPADM

## 2018-10-08 RX ORDER — ASCORBIC ACID 500 MG
1000 TABLET ORAL DAILY
Status: DISCONTINUED | OUTPATIENT
Start: 2018-10-09 | End: 2018-10-12 | Stop reason: HOSPADM

## 2018-10-08 RX ORDER — PROPOFOL 10 MG/ML
INJECTION, EMULSION INTRAVENOUS AS NEEDED
Status: DISCONTINUED | OUTPATIENT
Start: 2018-10-08 | End: 2018-10-08 | Stop reason: HOSPADM

## 2018-10-08 RX ORDER — HEPARIN SODIUM 1000 [USP'U]/ML
2000 INJECTION, SOLUTION INTRAVENOUS; SUBCUTANEOUS ONCE
Status: COMPLETED | OUTPATIENT
Start: 2018-10-08 | End: 2018-10-08

## 2018-10-08 RX ORDER — DEXTROSE 50 % IN WATER (D50W) INTRAVENOUS SYRINGE
12.5-25 AS NEEDED
Status: DISCONTINUED | OUTPATIENT
Start: 2018-10-08 | End: 2018-10-10

## 2018-10-08 RX ORDER — ALBUTEROL SULFATE 0.83 MG/ML
2.5 SOLUTION RESPIRATORY (INHALATION)
Status: DISCONTINUED | OUTPATIENT
Start: 2018-10-08 | End: 2018-10-10

## 2018-10-08 RX ORDER — ALBUMIN HUMAN 50 G/1000ML
SOLUTION INTRAVENOUS AS NEEDED
Status: DISCONTINUED | OUTPATIENT
Start: 2018-10-08 | End: 2018-10-08 | Stop reason: HOSPADM

## 2018-10-08 RX ADMIN — ROCURONIUM BROMIDE 50 MG: 10 INJECTION, SOLUTION INTRAVENOUS at 08:14

## 2018-10-08 RX ADMIN — CEFAZOLIN 1 G: 1 INJECTION, POWDER, FOR SOLUTION INTRAMUSCULAR; INTRAVENOUS; PARENTERAL at 12:00

## 2018-10-08 RX ADMIN — HEPARIN SODIUM 49000 UNITS: 1000 INJECTION, SOLUTION INTRAVENOUS; SUBCUTANEOUS at 09:11

## 2018-10-08 RX ADMIN — DESMOPRESSIN ACETATE 36 MCG: 4 INJECTION, SOLUTION INTRAVENOUS; SUBCUTANEOUS at 12:17

## 2018-10-08 RX ADMIN — SODIUM CHLORIDE: 9 INJECTION, SOLUTION INTRAVENOUS at 07:30

## 2018-10-08 RX ADMIN — Medication 10 ML: at 15:43

## 2018-10-08 RX ADMIN — SODIUM CHLORIDE 5.4 UNITS/HR: 900 INJECTION, SOLUTION INTRAVENOUS at 18:58

## 2018-10-08 RX ADMIN — ALBUMIN HUMAN 250 ML: 50 SOLUTION INTRAVENOUS at 11:47

## 2018-10-08 RX ADMIN — MORPHINE SULFATE 2 MG: 10 INJECTION, SOLUTION INTRAMUSCULAR; INTRAVENOUS at 13:15

## 2018-10-08 RX ADMIN — AMIODARONE HYDROCHLORIDE 1 MG/MIN: 50 INJECTION, SOLUTION INTRAVENOUS at 15:30

## 2018-10-08 RX ADMIN — VECURONIUM BROMIDE FOR INJECTION 4 MG: 1 INJECTION, POWDER, LYOPHILIZED, FOR SOLUTION INTRAVENOUS at 09:34

## 2018-10-08 RX ADMIN — MORPHINE SULFATE 2 MG: 2 INJECTION, SOLUTION INTRAMUSCULAR; INTRAVENOUS at 19:55

## 2018-10-08 RX ADMIN — GLYCOPYRROLATE 0.4 MG: 0.2 INJECTION INTRAMUSCULAR; INTRAVENOUS at 14:27

## 2018-10-08 RX ADMIN — FENTANYL CITRATE 100 MCG: 50 INJECTION, SOLUTION INTRAMUSCULAR; INTRAVENOUS at 08:08

## 2018-10-08 RX ADMIN — PROPOFOL 200 MG: 10 INJECTION, EMULSION INTRAVENOUS at 08:08

## 2018-10-08 RX ADMIN — SODIUM CHLORIDE 9 ML/HR: 900 INJECTION, SOLUTION INTRAVENOUS at 16:12

## 2018-10-08 RX ADMIN — PAPAVERINE HYDROCHLORIDE 30 MG: 30 INJECTION, SOLUTION INTRAMUSCULAR; INTRAVENOUS at 09:00

## 2018-10-08 RX ADMIN — SUCCINYLCHOLINE CHLORIDE 200 MG: 20 INJECTION INTRAMUSCULAR; INTRAVENOUS at 08:08

## 2018-10-08 RX ADMIN — ALBUMIN HUMAN 250 ML: 50 SOLUTION INTRAVENOUS at 11:59

## 2018-10-08 RX ADMIN — SUFENTANIL CITRATE 30 MCG: 50 INJECTION EPIDURAL; INTRAVENOUS at 08:32

## 2018-10-08 RX ADMIN — MORPHINE SULFATE 2 MG: 2 INJECTION, SOLUTION INTRAMUSCULAR; INTRAVENOUS at 16:05

## 2018-10-08 RX ADMIN — HEPARIN SODIUM 2000 UNITS: 1000 INJECTION, SOLUTION INTRAVENOUS; SUBCUTANEOUS at 09:00

## 2018-10-08 RX ADMIN — FENTANYL CITRATE 150 MCG: 50 INJECTION, SOLUTION INTRAMUSCULAR; INTRAVENOUS at 07:03

## 2018-10-08 RX ADMIN — MORPHINE SULFATE 2 MG: 10 INJECTION, SOLUTION INTRAMUSCULAR; INTRAVENOUS at 13:22

## 2018-10-08 RX ADMIN — ACETAMINOPHEN 1000 MG: 10 INJECTION, SOLUTION INTRAVENOUS at 16:08

## 2018-10-08 RX ADMIN — MIDAZOLAM HYDROCHLORIDE 5 MG: 1 INJECTION, SOLUTION INTRAMUSCULAR; INTRAVENOUS at 07:03

## 2018-10-08 RX ADMIN — AMINOCAPROIC ACID 10 G/HR: 250 INJECTION, SOLUTION INTRAVENOUS at 07:42

## 2018-10-08 RX ADMIN — SODIUM CHLORIDE 3.7 UNITS/HR: 900 INJECTION, SOLUTION INTRAVENOUS at 08:10

## 2018-10-08 RX ADMIN — Medication 2 G: at 19:57

## 2018-10-08 RX ADMIN — MUPIROCIN: 20 OINTMENT TOPICAL at 20:57

## 2018-10-08 RX ADMIN — Medication 3 G: at 13:44

## 2018-10-08 RX ADMIN — SODIUM CHLORIDE 0.4 MCG/KG/HR: 900 INJECTION, SOLUTION INTRAVENOUS at 20:55

## 2018-10-08 RX ADMIN — SODIUM CHLORIDE 0.5 MCG/KG/HR: 900 INJECTION, SOLUTION INTRAVENOUS at 11:40

## 2018-10-08 RX ADMIN — CALCIUM CHLORIDE 1 G: 100 INJECTION, SOLUTION INTRAVENOUS at 16:50

## 2018-10-08 RX ADMIN — MAGNESIUM SULFATE HEPTAHYDRATE 1 G: 1 INJECTION, SOLUTION INTRAVENOUS at 22:28

## 2018-10-08 RX ADMIN — MORPHINE SULFATE 2 MG: 2 INJECTION, SOLUTION INTRAMUSCULAR; INTRAVENOUS at 23:46

## 2018-10-08 RX ADMIN — SUFENTANIL CITRATE 0.3 MCG/KG/HR: 50 INJECTION EPIDURAL; INTRAVENOUS at 08:00

## 2018-10-08 RX ADMIN — SODIUM CHLORIDE 0.4 MCG/KG/HR: 900 INJECTION, SOLUTION INTRAVENOUS at 15:25

## 2018-10-08 RX ADMIN — ACETAMINOPHEN 1000 MG: 10 INJECTION, SOLUTION INTRAVENOUS at 20:58

## 2018-10-08 RX ADMIN — Medication 10 ML: at 21:34

## 2018-10-08 RX ADMIN — MORPHINE SULFATE 2 MG: 2 INJECTION, SOLUTION INTRAMUSCULAR; INTRAVENOUS at 17:43

## 2018-10-08 RX ADMIN — CHLORHEXIDINE GLUCONATE 10 ML: 1.2 RINSE ORAL at 17:50

## 2018-10-08 RX ADMIN — OXYCODONE HYDROCHLORIDE 10 MG: 5 TABLET ORAL at 22:28

## 2018-10-08 RX ADMIN — PROPOFOL 50 MG: 10 INJECTION, EMULSION INTRAVENOUS at 09:25

## 2018-10-08 RX ADMIN — VECURONIUM BROMIDE FOR INJECTION 2 MG: 1 INJECTION, POWDER, LYOPHILIZED, FOR SOLUTION INTRAVENOUS at 09:44

## 2018-10-08 RX ADMIN — ALBUMIN HUMAN 250 ML: 50 SOLUTION INTRAVENOUS at 14:00

## 2018-10-08 RX ADMIN — PROTAMINE SULFATE 350 MG: 10 INJECTION, SOLUTION INTRAVENOUS at 11:50

## 2018-10-08 RX ADMIN — SODIUM CHLORIDE: 9 INJECTION, SOLUTION INTRAVENOUS at 07:45

## 2018-10-08 RX ADMIN — SODIUM CHLORIDE, SODIUM LACTATE, POTASSIUM CHLORIDE, AND CALCIUM CHLORIDE 25 ML/HR: 600; 310; 30; 20 INJECTION, SOLUTION INTRAVENOUS at 06:33

## 2018-10-08 RX ADMIN — Medication 3 G: at 10:47

## 2018-10-08 RX ADMIN — PROPOFOL 50 MG: 10 INJECTION, EMULSION INTRAVENOUS at 08:31

## 2018-10-08 RX ADMIN — Medication 3 G: at 07:57

## 2018-10-08 RX ADMIN — NEOSTIGMINE METHYLSULFATE 3 MG: 1 INJECTION INTRAVENOUS at 14:27

## 2018-10-08 RX ADMIN — LIDOCAINE HYDROCHLORIDE 100 MG: 20 INJECTION, SOLUTION EPIDURAL; INFILTRATION; INTRACAUDAL; PERINEURAL at 08:08

## 2018-10-08 NOTE — IP AVS SNAPSHOT
[address] [facility] 
[phone] Patient: Karina Dalton 
MRN: [id number] Chauncey Epps About your hospitalization You were admitted on:  October 8, 2018 You last received care in the:  [facility] 2 PROGRESSIVE CARE You were discharged on:  October 12, 2018 Why you were hospitalized Your primary diagnosis was:  Not on File Your diagnoses also included:  Cad (Coronary Artery Disease), Cad (Coronary Artery Disease), Native Coronary Artery, S/P Cabg X 4 Follow-up Information Follow up With Details Comments Contact Info [facility] Go on 11/5/2018 Your appointment is at 1:00pm. [address] [facility] [address]   [address] [address] 
[phone] [facility] 227  This is the provider of your home health services. If you do not hear from them within 24 hours after discharge, please give them a call. [address] [facility] [address] 
[phone] Frandy Larios MD Go on 10/25/2018 For hospital follow up appointment at 2:15PM  [address] [facility] [address] [facility] 
[phone] Vanessa Simpson MD Go on 10/19/2018 For scheduled post op appointment at 2:00PM  [address] [facility] [address] [place] [address] 
[phone] Your Scheduled Appointments Friday October 19, 2018  2:00 PM EDT  
POST OP with Vanessa Simpson MD  
[facility] - [address]) [address] [place] [address] [address] [address] [phone] Thursday October 25, 2018  2:15 PM EDT TRANSITIONAL CARE MANAGEMENT with Frandy Larios, [address]) [address] [facility] [address] [facility]  
[phone] Monday November 05, 2018  1:00 PM EST  
CR INTAKE with [facility]  
[facility] CARDIOPULM [facility] ([address]) [address] [facility]  
[phone] Wednesday November 07, 2018  2:15 PM EST  
POST OP with Jnany Penny MD  
[facility] - [address]) [address] [facility] [address] Chris Ybarra [phone] Thursday December 06, 2018  8:15 AM EST  
ROUTINE CARE with Riya Wilson, [address]) 1500 [facility] [address] [facility]  
[phone] Discharge Orders None A check suly indicates which time of day the medication should be taken. My Medications START taking these medications Instructions Each Dose to Equal  
 Morning Noon Evening Bedtime  
 amiodarone 400 mg tablet Commonly known as:  Laxmi Peojulian Your last dose was: Your next dose is: Take 1 Tab by mouth daily for 30 days. 400 mg  
    
   
   
   
  
 metoprolol tartrate 25 mg tablet Commonly known as:  LOPRESSOR Your last dose was: Your next dose is: Take 1 Tab by mouth every twelve (12) hours for 60 days. 25 mg  
    
   
   
   
  
 oxyCODONE IR 5 mg immediate release tablet Commonly known as:  Annelise Holley Your last dose was: Your next dose is: Take 1 Tab by mouth every eight (8) hours as needed. Max Daily Amount: 15 mg.  
 5 mg  
    
   
   
   
  
 potassium chloride SR 20 mEq tablet Commonly known as:  K-TAB Your last dose was: Your next dose is: Take 1 Tab by mouth daily for 30 days. 20 mEq CHANGE how you take these medications Instructions Each Dose to Equal  
 Morning Noon Evening Bedtime  
 bumetanide 2 mg tablet Commonly known as:  Kirit Ram What changed:  See the new instructions. Your last dose was: Your next dose is: Take 1 Tab by mouth daily for 30 days. 2 mg metFORMIN 1,000 mg tablet Commonly known as:  GLUCOPHAGE What changed:   
- medication strength 
- how much to take Your last dose was: Your next dose is: Take 1 Tab by mouth two (2) times daily (with meals) for 60 days. 1000 mg CONTINUE taking these medications Instructions Each Dose to Equal  
 Morning Noon Evening Bedtime  
 allopurinol 300 mg tablet Commonly known as:  Cl Field Your last dose was: Your next dose is: TAKE 1 TABLET BY MOUTH EVERY DAY  
     
   
   
   
  
 amLODIPine 5 mg tablet Commonly known as:  Peyton Thomas Your last dose was: Your next dose is: Take 1 Tab by mouth daily for 180 days. 5 mg  
    
   
   
   
  
 ascorbic acid (vitamin C) 1,000 mg tablet Commonly known as:  VITAMIN C Your last dose was: Your next dose is: Take 1 Tab by mouth daily for 180 days. 1000 mg  
    
   
   
   
  
 aspirin 81 mg tablet Your last dose was: Your next dose is: Take 81 mg by mouth daily. 81 mg  
    
   
   
   
  
 glucose blood VI test strips strip Commonly known as:  Rere Renuka Your last dose was: Your next dose is:    
   
   
 Twice daily  
     
   
   
   
  
 lancets 30 gauge Misc Commonly known as:  Ly Hammock LANCETS Your last dose was: Your next dose is:    
   
   
 Twice daily LIPITOR 80 mg tablet Generic drug:  atorvastatin Your last dose was: Your next dose is: Take 80 mg by mouth daily. 80 mg  
    
   
   
   
  
 multivitamin tablet Commonly known as:  ONE A DAY Your last dose was: Your next dose is: Take 1 Tab by mouth daily. 1 Tab  
    
   
   
   
  
 omeprazole 20 mg capsule Commonly known as:  PRILOSEC Your last dose was: Your next dose is: Take 1 Cap by mouth daily. 20 mg  
    
   
   
   
  
 sAXagliptin 5 mg Tab tablet Commonly known as:  ONGLYZA Your last dose was: Your next dose is: Take 1 Tab by mouth daily. 5 mg STOP taking these medications   
 atenolol 100 mg tablet Commonly known as:  TENORMIN  
   
  
 chlorhexidine 0.12 % solution Commonly known as:  PERIDEX  
   
  
 losartan 50 mg tablet Commonly known as:  COZAAR Where to Get Your Medications Information on where to get these meds will be given to you by the nurse or doctor. ! Ask your nurse or doctor about these medications  
  amiodarone 400 mg tablet  
 amLODIPine 5 mg tablet  
 ascorbic acid (vitamin C) 1,000 mg tablet  
 bumetanide 2 mg tablet  
 metFORMIN 1,000 mg tablet  
 metoprolol tartrate 25 mg tablet  
 oxyCODONE IR 5 mg immediate release tablet  
 potassium chloride SR 20 mEq tablet Opioid Education Prescription Opioids: What You Need to Know: 
 
Prescription opioids can be used to help relieve moderate-to-severe pain and are often prescribed following a surgery or injury, or for certain health conditions. These medications can be an important part of treatment but also come with serious risks. Opioids are strong pain medicines. Examples include hydrocodone, oxycodone, fentanyl, and morphine. Heroin is an example of an illegal opioid. It is important to work with your health care provider to make sure you are getting the safest, most effective care. WHAT ARE THE RISKS AND SIDE EFFECTS OF OPIOID USE? Prescription opioids carry serious risks of addiction and overdose, especially with prolonged use. An opioid overdose, often marked by slow breathing, can cause sudden death. The use of prescription opioids can have a number of side effects as well, even when taken as directed.  
  
· Tolerance-meaning you might need to take more of a medication for the same pain relief · Physical dependence-meaning you have symptoms of withdrawal when the medication is stopped. Withdrawal symptoms can include nausea, sweating, chills, diarrhea, stomach cramps, and muscle aches. Withdrawal can last up to several weeks, depending on which drug you took and how long you took it. · Increased sensitivity to pain · Constipation · Nausea, vomiting, and dry mouth · Sleepiness and dizziness · Confusion · Depression · Low levels of testosterone that can result in lower sex drive, energy, and strength · Itching and sweating RISKS ARE GREATER WITH:      
· History of drug misuse, substance use disorder, or overdose · Mental health conditions (such as depression or anxiety) · Sleep apnea · Older age (72 years or older) · Pregnancy Avoid alcohol while taking prescription opioids. Also, unless specifically advised by your health care provider, medications to avoid include: · Benzodiazepines (such as Xanax or Valium) · Muscle relaxants (such as Soma or Flexeril) · Hypnotics (such as Ambien or Lunesta) · Other prescription opioids KNOW YOUR OPTIONS Talk to your health care provider about ways to manage your pain that don't involve prescription opioids. Some of these options may actually work better and have fewer risks and side effects. Consult your physician before adding or stopping any medications, treatments, or physical activity. Options may include: 
· Pain relievers such as acetaminophen, ibuprofen, and naproxen · Some medications that are also used for depression or seizures · Physical therapy and exercise · Counseling to help patients learn how to cope better with triggers of pain and stress. · Application of heat or cold compress · Massage therapy · Relaxation techniques Be Informed Make sure you know the name of your medication, how much and how often to take it, and its potential risks & side effects. IF YOU ARE PRESCRIBED OPIOIDS FOR PAIN: 
· Never take opioids in greater amounts or more often than prescribed. Remember the goal is not to be pain-free but to manage your pain at a tolerable level. · Follow up with your primary care provider to: · Work together to create a plan on how to manage your pain. · Talk about ways to help manage your pain that don't involve prescription opioids. · Talk about any and all concerns and side effects. · Help prevent misuse and abuse. · Never sell or share prescription opioids · Help prevent misuse and abuse. · Store prescription opioids in a secure place and out of reach of others (this may include visitors, children, friends, and family). · Safely dispose of unused/unwanted prescription opioids: Find your community drug take-back program or your pharmacy mail-back program, or flush them down the toilet, following guidance from the Food and Drug Administration (www.fda.gov/Drugs/ResourcesForYou). · Visit www.cdc.gov/drugoverdose to learn about the risks of opioid abuse and overdose. · If you believe you may be struggling with addiction, tell your health care provider and ask for guidance or call [address] [place] [address] at [phone]-[facility]. Discharge Instructions Card[facility] Specialist 
 
[address] [place] [address] [address] [facility] Office- [phone]  Fax- [phone]       Office- [phone]  Fax- [phone] 
_____________________________________________________________ Dr. Italo De Santiago [facility]   Ava Buckner NP    
 
 Klaudia Carvalho PA-C Name:Florian RILEY Anson Drake Active Problems: 
  CAD (Coronary Artery Disease) - stent 1999 (10/20/2009) CAD (coronary artery disease), native coronary artery (10/8/2018) S/P CABG x 4 (10/8/2018) Discharge Date: 10/12/2018 Discharge to: Home INSTRUCTIONS: 
NO SMOKING OR TOBACCO PRODUCTS Follow all the instructions in your discharge book You may shower. Wash all incisions twice daily with mild soap and water. No lotions, ointments or powder. Call the office immediately for any redness, swelling, or drainage from your incision. Take your temperature daily and call for a temperature of 101 degrees or higher or for any symptoms that make you think you have and infection. Weigh yourself each morning. Call if you gain more than 5 pounds in 48 hours. Use the incentive spirometer 6-8 times a day-10 breaths each time. Use a pillow or your bear to splint your breastbone when coughing or sneezing. If you feel your breast bone clicking or popping, notify the office immediately. Walk several hundred feet several times daily. DIET 
2000 Kcal ADA diet. Check your blood sugars  And keep a log of your results. ACTIVITY 
NO DRIVINGyou will be evaluated to drive at your follow up visit. Increase your activity by walking several times a day. Stay out of bed most of the day. When sitting, keep your legs elevated. You may ride in a car, but you must get out every hour and walk around. If you ride in a car with an airbag that can not be switched off, put the seat ALL the way back or ride in the back seat. NO LIFTING MORE THAN 5 POUND FOR 1 MONTH, THEN YOU CAN INCREASE TO NO MORE THAN 10 LBS FOR THE NEXT 2 MONTHS. AFTER 3 MONTHS, YOU WILL NO LONGER HAVE ANY LIFTING RESTRICTIONS. FOLLOW UP 
 
       1.  Your first follow up appointment with your surgeon will be on 10/19/18 at 2 PM  [facility] office 2. Your appointment with Dr. Binh Oreilly  will be arranged later 3. Your second appointment with your surgeon will be on 11/17/18 at 2:15 PM 
       4. Please call our office at [phone] if you are unable to make either one of these appointments. 5.  Our  will make an appointment with your cardiologist for you after your one month appointment with your surgeon. 6.  Consult you primary care physician regarding your influenza &  
pneumovax vaccines. 7.   PLEASE bring your medication bottles to each appointment. 8. Please call Cardiac Rehab at [phone] if you do not already have an appointment. 9. Please sign up for My Chart. CALL [phone] FOR ANY QUESTIONS OR PROBLEMS. Signature:___________________________________________________ Smoking Cessation Program:  
This is a free, 
phone/text/email based, smoking cessation program. The program is individualized to meet each patient's needs. To enroll use this link [email or web address] Help Scout Announcement We are excited to announce that we are making your provider's discharge notes available to you in Help Scout. You will see these notes when they are completed and signed by the physician that discharged you from your recent hospital stay. If you have any questions or concerns about any information you see in MarketBrieft, please call the Health Information Department where you were seen or reach out to your Primary Care Provider for more information about your plan of care. Introducing [facility]! Dear Hallie Martinez: Thank you for requesting a Help Scout account. Our records indicate that you already have an active Help Scout account. You can access your account anytime at [email or web address] Did you know that you can access your hospital and ER discharge instructions at any time in GIVTEDhart? You can also review all of your test results from your hospital stay or ER visit. Additional Information If you have questions, please visit the Frequently Asked Questions section of the VINTAGEHUB website at [email or web address] Remember, Poupt is NOT to be used for urgent needs. For medical emergencies, dial 911. Now available from your iPhone and Android! Introducing Tyler Otto As a Paula Witt patient, I wanted to make you aware of our electronic visit tool called Tyler Clarita. Paula Witt 24/7 allows you to connect within minutes with a medical provider 24 hours a day, seven days a week via a mobile device or tablet or logging into a secure website from your computer. You can access Tyler Otto from anywhere in the [place]. A virtual visit might be right for you when you have a simple condition and feel like you just dont want to get out of bed, or cant get away from work for an appointment, when your regular Paula Witt provider is not available (evenings, weekends or holidays), or when youre out of town and need minor care. Electronic visits cost only $49 and if the Paula Witt 24/7 provider determines a prescription is needed to treat your condition, one can be electronically transmitted to a nearby pharmacy*. Please take a moment to enroll today if you have not already done so. The enrollment process is free and takes just a few minutes. To enroll, please download the Paula Witt 24/7 mary kate to your tablet or phone, or visit [email or web address] to enroll on your computer. And, as an [address] [place] [address] patient with a [employer] account, the results of your visits will be scanned into your electronic medical record and your primary care provider will be able to view the scanned results.    
We urge you to continue to see your regular Paula Witt provider for your ongoing medical care. And while your primary care provider may not be the one available when you seek a Tyler Otto virtual visit, the peace of mind you get from getting a real diagnosis real time can be priceless. For more information on Tyler Dentonbob, view our Frequently Asked Questions (FAQs) at [email or web address]. Sincerely, 
 
Matt Gale MD 
Chief Medical Officer Ann Rivera *:  certain medications cannot be prescribed via Tyler Otto Providers Seen During Your Hospitalization Provider Specialty Primary office phone Maria Durbin MD Cardiothoracic Surgery [phone] Your Primary Care Physician (PCP) Primary Care Physician Office Phone Office Fax Anusha Tovar [phone] You are allergic to the following No active allergies Recent Documentation Height Weight BMI Smoking Status 1.854 m 125.1 kg 36.39 kg/m2 Light Tobacco Smoker Emergency Contacts Name Discharge Info Relation Home Work Mobile Chula Gann DISCHARGE CAREGIVER [3] Girlfriend [18]   [phone] Anson Martínez DISCHARGE CAREGIVER [3] Child [2] [phone] Patient Belongings The following personal items are in your possession at time of discharge: 
  Dental Appliances: Partials, Uppers  Visual Aid: Contacts      Home Medications: None   Jewelry: None  Clothing: Shirt, Shorts, Footwear, Undergarments    Other Valuables: Eyeglasses  Personal Items Sent to Safe: declined Please provide this summary of care documentation to your next provider. Signatures-by signing, you are acknowledging that this After Visit Summary has been reviewed with you and you have received a copy. Patient Signature:  ____________________________________________________________ Date:  ____________________________________________________________  
  
Naveen Captain  Provider Signature: ____________________________________________________________ Date:  ____________________________________________________________

## 2018-10-08 NOTE — OP NOTES
[address] 
OPERATIVE REPORT Name:CULLEN BAER SR MR#: [id number] : 1960 ACCOUNT #: [de-identified] DATE OF SERVICE: 10/08/2018 PREOPERATIVE DIAGNOSIS:  Coronary artery disease. POSTOPERATIVE DIAGNOSIS:  Coronary artery disease. PROCEDURE PERFORMED:  Coronary artery bypass grafts, using left internal mammary artery to the anterior descending, saphenous vein graft from the aorta to the posterior descending artery, and saphenous vein graft from the ascending aorta to the first marginal branch of the circumflex; 
endoscopic vein harvesting from the right lower extremity; 
epiaortic ultrasound; 
TRACI by Dr. Miah Ruff. SURGEON:  Maribel Erazo MD 
 
ASSISTANT:  Tess Degroot MD and Carmen Shrestha. ANESTHESIOLOGIST:  Kirit Maravilla MD 
 
ANESTHESIA:  General endotracheal. 
 
SPECIMENS REMOVED: none ESTIMATED BLOOD LOSS:  1 liter. IMPLANTS:  None. COMPLICATIONS:  None recognized. PROCEDURE:  The patient was taken to the operating room and placed on the operating table in the supine position. After induction of general endotracheal anesthesia, Dr. Miah Ruff placed a TRACI probe. TRACI findings were discussed. The operative field was prepared with chlorhexidine. Sterile drapes were applied. The saphenous vein was harvested endoscopically from the right lower extremity while median sternotomy was performed. The left internal mammary artery was harvested with its pedicle and then wrapped in a papaverine soaked sponge. The patient was systemically anticoagulated with heparin. Pericardium was opened longitudinally and suspended. The ascending aorta was interrogated with the ultrasound probe but no prohibitive lesions were found. The patient was cannulated in the ascending aorta and in the right auricle using a two-stage venous cannula.  A combination cardioplegia and root suction cannula was placed in the ascending aorta. The patient was placed on cardiopulmonary bypass and cooled to 34 degrees centigrade. The ascending aorta was clamped and cold hyperkalemic sanguineous cardioplegic solution was instilled antegrade into the aortic root. Topical cold solution was applied to the pericardial well. The antegrade cardioplegia was repeated intermittently throughout the period of aortic cross-clamping. The vein grafts were added to the antegrade circuit as they became available. The first length of saphenous vein was taken end-to-side to the posterior descending artery for anastomosis parallel in orientation using running 7-0 Prolene. The posterior descending was diffusely diseased at the site of arteriotomy and had only about a 1.6 mm inside diameter. The anastomosis was technically satisfactory. The next length of saphenous vein was taken end-to-side for anastomosis with the first marginal branch of the circumflex which was approached in an intramyocardial location where it was seen to be a 2.5 mm coronary artery with nice soft walls. The anastomosis was technically satisfactory, parallel in orientation. The mammary artery was trimmed and spatulated and taken end-to-side for anastomosis on the anterior descending. This was seen to be about a 2.2 mm artery. The anastomosis was technically satisfactory using running 7-0 Prolene. The mammary was tacked at the epicardium. The two vein grafts were originated from the ascending aorta using punch aortotomies and running 6-0 Prolene. After appropriate de-airing maneuvers, the aortic crossclamp was removed. Spontaneous cardiac contractions resumed. The grafts were seen to lie nicely and to be hemostatic. The patient was  from cardiopulmonary bypass without difficulty. TRACI showed good left ventricular function. Anticoagulation was reversed with protamine. Decannulation was completed. Hemostasis was assured.   A temporary bipolar pacing lead was placed in the anterior aspect of the right ventricle. Two temporary pacing leads were sewn to the right atrial free wall. Saeed drains were placed in the left pleural space, the inferior pericardial cavity, and the anterior pericardial cavity. The pericardium was not closed. Good hemodynamic function continued. Sternum was closed with stainless steel wire, linea alba was closed with interrupted 0 Vicryl. The presternal fascia closed with running 0 Vicryl. Dermis was approximated with a running Monocryl. Sterile dressing was applied. Patient had a technically satisfactory revascularization procedure that went well. Bella Marie MD dictating on behalf of Shae Whitley. MD CARLEEN Queen / CLAUDIO 
D: 10/08/2018 13:33    
T: 10/08/2018 15:03 JOB #: [id number]

## 2018-10-08 NOTE — ANESTHESIA PREPROCEDURE EVALUATION
Anesthetic History No history of anesthetic complications Review of Systems / Medical History Patient summary reviewed, nursing notes reviewed and pertinent labs reviewed Pulmonary Within defined limits Sleep apnea: No treatment Neuro/Psych Within defined limits Cardiovascular Within defined limits Hypertension CAD Exercise tolerance: >4 METS 
  
GI/Hepatic/Renal 
Within defined limits GERD Endo/Other Within defined limits Diabetes Morbid obesity Other Findings Physical Exam 
 
Airway Mallampati: II 
TM Distance: 4 - 6 cm Neck ROM: normal range of motion Mouth opening: Normal 
 
 Cardiovascular Regular rate and rhythm,  S1 and S2 normal,  no murmur, click, rub, or gallop Dental 
No notable dental hx Pulmonary Breath sounds clear to auscultation Abdominal 
GI exam deferred Other Findings Anesthetic Plan ASA: 3 Anesthesia type: general 
 
Monitoring Plan: Arterial line, BIS, CVP, [place] and TRACI Post procedure ventilation Induction: Intravenous Anesthetic plan and risks discussed with: Patient

## 2018-10-08 NOTE — IP AVS SNAPSHOT
[address] 
[phone] Patient: Jeffrey Paul 
MRN: [id number] Jeanette Cerrato A check suly indicates which time of day the medication should be taken. My Medications START taking these medications Instructions Each Dose to Equal  
 Morning Noon Evening Bedtime  
 amiodarone 400 mg tablet Commonly known as:  Manuel Lott Your last dose was: Your next dose is: Take 1 Tab by mouth daily for 30 days. 400 mg  
    
   
   
   
  
 metoprolol tartrate 25 mg tablet Commonly known as:  LOPRESSOR Your last dose was: Your next dose is: Take 1 Tab by mouth every twelve (12) hours for 60 days. 25 mg  
    
   
   
   
  
 oxyCODONE IR 5 mg immediate release tablet Commonly known as:  Deniz Zimmerman Your last dose was: Your next dose is: Take 1 Tab by mouth every eight (8) hours as needed. Max Daily Amount: 15 mg.  
 5 mg  
    
   
   
   
  
 potassium chloride SR 20 mEq tablet Commonly known as:  K-TAB Your last dose was: Your next dose is: Take 1 Tab by mouth daily for 30 days. 20 mEq CHANGE how you take these medications Instructions Each Dose to Equal  
 Morning Noon Evening Bedtime  
 bumetanide 2 mg tablet Commonly known as:  Primitivo Villarreal What changed:  See the new instructions. Your last dose was: Your next dose is: Take 1 Tab by mouth daily for 30 days. 2 mg  
    
   
   
   
  
 metFORMIN 1,000 mg tablet Commonly known as:  GLUCOPHAGE What changed:   
- medication strength 
- how much to take Your last dose was: Your next dose is: Take 1 Tab by mouth two (2) times daily (with meals) for 60 days. 1000 mg CONTINUE taking these medications Instructions Each Dose to Equal  
 Morning Noon Evening Bedtime  
 allopurinol 300 mg tablet Commonly known as:  Rayna Mendez Your last dose was: Your next dose is: TAKE 1 TABLET BY MOUTH EVERY DAY  
     
   
   
   
  
 amLODIPine 5 mg tablet Commonly known as:  Michael Whyte Your last dose was: Your next dose is: Take 1 Tab by mouth daily for 180 days. 5 mg  
    
   
   
   
  
 ascorbic acid (vitamin C) 1,000 mg tablet Commonly known as:  VITAMIN C Your last dose was: Your next dose is: Take 1 Tab by mouth daily for 180 days. 1000 mg  
    
   
   
   
  
 aspirin 81 mg tablet Your last dose was: Your next dose is: Take 81 mg by mouth daily. 81 mg  
    
   
   
   
  
 glucose blood VI test strips strip Commonly known as:  Srinivasan Morrell Your last dose was: Your next dose is:    
   
   
 Twice daily  
     
   
   
   
  
 lancets 30 gauge Misc Commonly known as:  Gayle Sportsman LANCETS Your last dose was: Your next dose is:    
   
   
 Twice daily LIPITOR 80 mg tablet Generic drug:  atorvastatin Your last dose was: Your next dose is: Take 80 mg by mouth daily. 80 mg  
    
   
   
   
  
 multivitamin tablet Commonly known as:  ONE A DAY Your last dose was: Your next dose is: Take 1 Tab by mouth daily. 1 Tab  
    
   
   
   
  
 omeprazole 20 mg capsule Commonly known as:  PRILOSEC Your last dose was: Your next dose is: Take 1 Cap by mouth daily. 20 mg  
    
   
   
   
  
 sAXagliptin 5 mg Tab tablet Commonly known as:  ONGLYZA Your last dose was: Your next dose is: Take 1 Tab by mouth daily. 5 mg STOP taking these medications   
 atenolol 100 mg tablet Commonly known as:  TENORMIN  
 chlorhexidine 0.12 % solution Commonly known as:  PERIDEX  
   
  
 losartan 50 mg tablet Commonly known as:  COZAAR Where to Get Your Medications Information on where to get these meds will be given to you by the nurse or doctor. ! Ask your nurse or doctor about these medications  
  amiodarone 400 mg tablet  
 amLODIPine 5 mg tablet  
 ascorbic acid (vitamin C) 1,000 mg tablet  
 bumetanide 2 mg tablet  
 metFORMIN 1,000 mg tablet  
 metoprolol tartrate 25 mg tablet  
 oxyCODONE IR 5 mg immediate release tablet  
 potassium chloride SR 20 mEq tablet

## 2018-10-08 NOTE — ANESTHESIA PROCEDURE NOTES
Arterial Line Placement Start time: 10/8/2018 7:16 AM 
End time: 10/8/2018 7:26 AM 
Performed by: Lucinda Cogan Authorized by: Lucinda Cogan Pre-Procedure Indications:  Arterial pressure monitoring and blood sampling Preanesthetic Checklist: patient identified, risks and benefits discussed, anesthesia consent, site marked, patient being monitored, timeout performed and patient being monitored Procedure:  
Prep:  Chlorhexidine Seldinger Technique?: Yes Orientation:  Right Location:  Radial artery Catheter size:  20 G Number of attempts:  1 Assessment:  
Post-procedure:  Line secured and sterile dressing applied Patient Tolerance:  Patient tolerated the procedure well with no immediate complications

## 2018-10-08 NOTE — ANESTHESIA PROCEDURE NOTES
Central Line Placement Start time: 10/8/2018 6:51 AM 
End time: 10/8/2018 7:06 AM 
Performed by: Magali Chanel Authorized by: Magali Chanel Indications: vascular access, central pressure monitoring and need for vasopressors Preanesthetic Checklist: patient identified, risks and benefits discussed, anesthesia consent, site marked, patient being monitored and timeout performed Pre-procedure: All elements of maximal sterile barrier technique followed? Yes   
2% Chlorhexidine for cutaneous antisepsis, Hand hygiene performed prior to catheter insertion and Ultrasound guidance Procedure:  
Prep:  Chlorhexidine Location:  Internal jugular Orientation:  Right Catheter type:  Triple lumen Catheter size:  9 Fr Catheter length:  10 cm Number of attempts:  1 Successful placement: Yes Assessment:  
Post-procedure:  Catheter secured, sterile dressing applied and sterile dressing with CHG applied Assessment:  Blood return through all ports and free fluid flow Insertion:  Uncomplicated Patient tolerance:  Patient tolerated the procedure well with no immediate complications PA catheter inserted .

## 2018-10-08 NOTE — PROGRESS NOTES
Physical Therapy Note Orders received. Chart reviewed. Pt underwent cardiac procedure this date. Will follow up tomorrow for PT evaluation as appropriate.  
 
Thank you, 
Nehemiah Larios, PT, DPT

## 2018-10-08 NOTE — PROGRESS NOTES
[facility] Consult Service Progress NOTEPulmonary, Critical Care, and Sleep Medicine Name: Michelle Simental MRN: [id number] : 1960 Hospital: [facility] Date: 10/8/2018  Admission Date: 10/8/2018 Hospital Day: 1 Chart and notes reviewed. Data reviewed. I have evaluated and examined the patient. Pt now in CCU following coronary revascularization; Excerpts from admission and consult notes reviewed as follows: Michelle Simental is a 63 y.o. hypertensive, uncontrolled type 2 diabetic with peripheral neuropathy, cigarette smoking, s/p PCI to OM at age 29, cau male who was referred for opinion and advise regarding coronary revascularization  by Dr. Jose Antonio Slaughter / Conrado Higgins MD.  
   
One month history of exertional chest pain with left arm radiation associated dyspnea relieved by rest/ time. No resting symptoms. Ankle edema noticed relieved while supine overnight. Denies nausea, vomiting, claudication, palpitations, PND or syncope. \" 
 
Cardiac catheretization LM 60-70% Patent OM stent Multiple tight RCA blockages Intubated on Vent, sedated Infusions: insulin, mar,  
Drains and pacing wires: 2 atrial wires, 1 bipolar ventricular wire, 3 saw drains IMPRESSION:  
1. CAD 2. S/p CABG 10/8/2018 3. Coronary Artery Bypass Grafting x 3, LIMA to LAD, RSVG Sequenced to PDA, OM; Left [facility] 4. Post op hypoxia 5. Diabetes ([address] 6. HTN 
7. GERD (gastroesophageal reflux disease) 8. Gout 9. Sleep apnea untreated 10. Morbid obesity 11. Heart attack ([facility])[id number] stents placed 12. 12 Cans of beer per week Comment: 2 beers per week 13. Light Tobacco Smoker Packs/day:0.25 14. Additional workup outlined below 15. Pt is requiring Drug therapy requiring intensive monitoring for toxicity RECOMMENDATIONS/PLAN:  
1. CCU monitoring 2. Monitor bronchial secretions 3. CXR in AM 
4. Wean from vent per protocol 5. Hemodynamic monitoring 6. Bronchial hygiene therapy 7. Bronchodilators prn 8. post op care per protocol 9. hemodynamics per cardiac surgery 10. chest tubes in place 11. Smoking cessation counseling when able 12. PT, OT when able 13. Pt needs IV fluids with additives and Drug therapy requiring intensive monitoring for toxicity 14. DVT, SUP prophylaxiss 15. Glucose monitoring and insulin management for tight glycemic control 16. Prescription drug management with home med reconciliation done My assessment/management discussed with: Cardiothoracic surgery, Cardiology, Consultants, Nursing, Pharmacy, Case Management, PT, OT, Respiratory Therapy, Nutrition, Diabetes specialist and Family for coordination of care Pt's condition is acute and unstable requiring inpatient hospitalization. This care involved high complexity decision making which includes independently reviewing the patient's past medical records, current laboratory results, medication profiles that were immediately available to me and actual Xray images at the bedside in order to assess, support vital system function, and to treat this degree of vital organ system failure, and to prevent further deterioration of the patients condition. Risk of deterioration: high  
[x] High complexity decision making was performed [x] See my orders for details Social History Substance Use Topics  Smoking status: Light Tobacco Smoker Packs/day: 0.25 Years: 10.00 Types: Cigarettes  Smokeless tobacco: Never Used  Alcohol use Yes Comment: 22 drinks per week Family History Problem Relation Age of Onset  Diabetes Mother  No Known Problems Father No Known Allergies MAR reviewed and pertinent medications noted or modified as needed Current Facility-Administered Medications Medication  [START ON 10/9/2018] ascorbic acid (vitamin C) (VITAMIN C) tablet 1,000 mg  mupirocin (BACTROBAN) 2 % ointment  acetaminophen (OFIRMEV) infusion 1,000 mg  
 calcium chloride 1 g in 0.9% sodium chloride 250 mL IVPB  oxyCODONE IR (ROXICODONE) tablet 5-10 mg  
 sodium chloride (NS) flush 5-10 mL  sodium chloride (NS) flush 5-10 mL  albumin human 5% (BUMINATE) solution 12.5 g  
 0.45% sodium chloride infusion  
 0.9% sodium chloride infusion  acetaminophen (TYLENOL) tablet 650 mg  
 morphine injection 1-2 mg  
 naloxone (NARCAN) injection 0.4 mg  
 ceFAZolin (ANCEF) 2 g/20 mL in sterile water IV syringe  amiodarone (CORDARONE) tablet 400 mg  [START ON 10/9/2018] atorvastatin (LIPITOR) tablet 20 mg  
 ondansetron (ZOFRAN) injection 4 mg  albuterol (PROVENTIL VENTOLIN) nebulizer solution 2.5 mg  
 DOBUTamine (DOBUTREX) 2,000 mcg/ml infusion  [START ON 10/9/2018] aspirin chewable tablet 81 mg  
 chlorhexidine (PERIDEX) 0.12 % mouthwash 10 mL  [START ON 10/9/2018] magnesium oxide (MAG-OX) tablet 400 mg  [START ON 10/9/2018] polyethylene glycol (MIRALAX) packet 34 g  [START ON 10/9/2018] senna-docusate (PERICOLACE) 8.6-50 mg per tablet 1 Tab  ELECTROLYTE REPLACEMENT PROTOCOL  [START ON 10/9/2018] magnesium sulfate 1 g/100 ml IVPB (premix or compounded)  insulin regular (NOVOLIN R, HUMULIN R) 100 Units in 0.9% sodium chloride 100 mL infusion  glucose chewable tablet 16 g  
 dextrose (D50W) injection syrg 12.5-25 g  
 glucagon (GLUCAGEN) injection 1 mg  
 insulin lispro (HUMALOG) injection  insulin lispro (HUMALOG) injection  amiodarone (CORDARONE) 900 mg/250 ml D5W infusion  dexmedeTOMidine (PRECEDEX) 400 mcg in 0.9% sodium chloride 100 mL infusion Vital Signs: Intake/Output: Intake/Output:  
Visit Vitals  /70  Pulse 87  Temp 96.6 °F (35.9 °C)  Resp 14  
 Ht 6' 1\" (1.854 m)  Wt 122.5 kg (270 lb 1 oz)  SpO2 100%  BMI 35.63 kg/m2 Temp (24hrs), Av.3 °F (36.3 °C), Min:96.5 °F (35.8 °C), Max:98.7 °F (37.1 °C) Telemetry: PACED O2 Device: Other (comment) O2 Flow Rate (L/min): 2 l/min Wt Readings from Last 4 Encounters:  
10/08/18 122.5 kg (270 lb 1 oz) 10/04/18 125 kg (275 lb 9.2 oz)  
10/02/18 122.5 kg (270 lb)  
07/26/18 124.3 kg (274 lb) Intake/Output Summary (Last 24 hours) at 10/08/18 1445 Last data filed at 10/08/18 1359 Gross per 24 hour Intake             2175 ml Output              750 ml Net             1425 ml Last shift:      10/08 0701 - 10/08 1900 In: 2175 [I.V.:1500] Out: 750 [Urine:750] Last 3 shifts:   
Physical Exam:  
 General: severely ill;   male HEAD: Normocephalic, without obvious abnormality, atraumatic EYES: conjunctivae clear. PERRL,  AN Icteric sclerae NOSE: nares normal, no drainage, no flaring, THROAT: mucous membranes dry; Lips, mucosa dry; intubated Neck: Supple, symmetrical, trachea midline,  No accessory mm use; No Stridor/ cuff leak, No goiter or thyroid tenderness LYMPH: No abnormally enlarged lymph nodes. in neck or groin Chest: increased AP diameter; mediastinal tubes in place; pacemaker wires Lungs: decreased air exchange bibasilar Heart: Regular rate and rhythm ; NO edema Abdomen: soft, protuberant : normal;  Parish,  clear urine; NO gross hematuria Extremity: negative, cyanosis, clubbing Neuro: obtunded; NON verbal; withdraws to pain; unable to check gait and station; does NOT follow simple commands Psych: oriented to time, place and person; Unable to assess; No agitation; normal affect Skin: Pallor; ;  
 Pulses:Bilateral, DP, 1+ Capillary refill: normal; warm, well perfused, 
 
Tubes:   Parish and Intubated/Vent DATA: 
 
Labs: 
 
No results for input(s): WBC, HGB, PLT, INR, APTT, HGBEXT, PLTEXT in the last 72 hours. No lab exists for component: FIB, DDMER, INREXT No results for input(s): NA, K, CL, CO2, GLU, BUN, CREA, CA, MG, PHOS, LAC, ALB, TBIL, SGOT, ALT, AML, LPSE in the last 72 hours. No results for input(s): PH, PCO2, PO2, HCO3, FIO2 in the last 72 hours. No results for input(s): CPK, CKNDX, TROIQ in the last 72 hours. No lab exists for component: CPKMB No results found for: BNPP, BNP Lab Results Component Value Date/Time Culture result: NO GROWTH 2 DAYS 10/04/2018 02:12 PM  
 Culture result: MRSA NOT PRESENT 10/04/2018 02:12 PM  
 Culture result:  10/04/2018 02:12 PM  
      Screening of patient nares for MRSA is for surveillance purposes and, if positive, to facilitate isolation considerations in high risk settings. It is not intended for automatic decolonization interventions per se as regimens are not sufficiently effective to warrant routine use. Lab Results Component Value Date/Time TSH 1.66 10/04/2018 02:12 PM  
 
 
Imaging:  
  
 
Results from Hospital Encounter encounter on 10/04/18 XR CHEST PA LAT Narrative EXAM:  XR CHEST PA LAT INDICATION:  Preoperative chest radiograph before heart surgery. COMPARISON: May 12, 2009 TECHNIQUE: PA and lateral chest views FINDINGS: The cardiomediastinal and hilar contours are within normal limits. The 
pulmonary vasculature is within normal limits. The lungs and pleural spaces are clear. The visualized bones and upper abdomen 
are age-appropriate. Impression IMPRESSION: 
 
Normal PA and lateral chest views. No results found for this or any previous visit. I have personally and independently reviewed the patients interval lab, diagnostic data, radiographs and the reports. I have ordered additional labs to follow the current medical conditions and to monitor treatment responses over the next 24 hours or sooner if needed. I will order additional imaging to follow longitudinal changes found on the most current imaging. Medical Decision Making Today: · Reviewed the flowsheet and previous days notes · Reviewed and summarized records or history from previous days note or discussions with staff, family · Parenteral controlled substances - Reviewed/ Adjusted / Weaned / Started · High Risk Drug therapy requiring intensive monitoring for toxicity: eg steroids, pressors, antibiotics · Reviewed and/or ordered Clinical lab tests · Reviewed and/or ordered Radiology tests · Reviewed and/or ordered of Medicine tests · Independently visualized radiologic Images · Reviewed and/or adjusted Ventilator / NiPPV settings · I have personally reviewed the patients ECG / Telemetry Celio Bauer MD

## 2018-10-08 NOTE — ANESTHESIA PROCEDURE NOTES
Central Line Placement Start time: 10/8/2018 7:06 AM 
End time: 10/8/2018 7:17 AM 
Performed by: Zehra Meyer Authorized by: Zehra Meyer Indications: vascular access and central pressure monitoring Preanesthetic Checklist: patient identified, risks and benefits discussed, anesthesia consent, site marked, patient being monitored and timeout performed Pre-procedure: All elements of maximal sterile barrier technique followed? Yes   
2% Chlorhexidine for cutaneous antisepsis and Hand hygiene performed prior to catheter insertion Procedure:  
Prep:  Chlorhexidine Location:  Internal jugular Orientation:  Right Patient position:  Trendelenburg Catheter type:  Triple lumen Catheter size:  7 Fr Catheter length:  16 cm Number of attempts:  1 Successful placement: Yes Assessment:  
Post-procedure:  Catheter secured, sterile dressing applied and sterile dressing with CHG applied Assessment:  Blood return through all ports and free fluid flow Insertion:  Uncomplicated Patient tolerance:  Patient tolerated the procedure well with no immediate complications

## 2018-10-08 NOTE — PROGRESS NOTES
1430 Patient arrived into CCU from OR with OR team, Dr Roscoe Burnette , and Sierra DAWN. Patient CABG x 3 with precedex, insulin and amio gtts infusing. Report received from CRNA at the bedside. Chest tube drainage so far 55. Labs, chest x-ray, EKG, and assessment completed. 1652  ABG drawn on SBT. PO2 low and drawn on Fio2 60%. D/w Dr. Isabel Watts. Order received to decrease Fio2 to 30% and redraw ABG later. 1719  ABG drawn and shown to Dr. Isabel Watts. Order received to extubate pt. BIPAP ordered PRN in case sleep apnea causes problems 1734  Pt extubated to 6LPM NC. Tolerated well.

## 2018-10-08 NOTE — ANESTHESIA PROCEDURE NOTES
TRACI 
 
 
 
Procedure Details: probe placement, image aquisition & interpretation Site marked Risks and benefits discussed with the patient and plans are to proceed Procedure Note Performed by: Denilson Toribio Authorized by: Denilson Toribio Indications: assessment of ascending aorta and assessment of surgical repair Modalities: 2D, CF, CWD, contrast, PWD Probe Type: epiaortic and multiplane Insertion: atraumatic Patient Status: intubated Echocardiographic and Doppler Measurements Aorta  Size  Diam(cm)  Dissection PlaqueThick(mm)  Plaque Mobile Ascending normal  No 0-3 No  
 Arch normal  No  No  
 Descending normal  No >3 No  
 
 
 
 Valves  Annulus  Stenosis  Area/Grad  Regurg  Leaflet Morph  Leaflet Motion Aortic normal none  0 normal normal  
 Mitral dilated   1+ normal normal  
 Tricuspid normal none  2+ normal normal  
 
 
 
 Atria  Size  SEC (smoke)  Thrombus  Tumor  Device Rt Atrium dilated No No No No  
 Lt Atrium normal No No No No  
 
Interatrial Septum Morphology: normal 
 
Interventricular Septum Morphology: normal 
 
Ventricle  Cavity Size  Cavity Dimension Hypertrophy  Thrombus  Gloal FXN  EF  
 RV normal  No no normal   
 LV normal  Yes No normal 60 Regional Function 
(1 = normal, 2 = mildly hypokinetic, 3 = severely hypokinetic, 4 = akinetic, 5 = dyskinetic) LAV - [place] ME LAV = 0  ME LAV = 90  ME LAV = 130 Basal Sept:1 Basal Ant:1 Basal Post:1 Mid Sept:1 Mid Ant:1 Mid Post:1 Apical Sept:1 Apical Ant:1 Basal Ant Sept:1 Basal Lat:1 Basal Inf:1 Mid Lat:1 Mid Inf:1 Apical Lat:1 Apical Inf:1   
 
Diastolic function: normal 
Pericardium: normal 
 
Post Intervention Follow-up Study Ventricular Global Function: improved Ventricular Regional Function: improved Valve  Function  Regurgitation  Area Aortic no change Mitral no change Tricuspid no change Prosthetic Complications: None

## 2018-10-08 NOTE — PERIOP NOTES
TRANSFER - OUT REPORT: 
 
Verbal report given to ANDRE Langston RN on Salma Martinez  being transferred to CCU for routine post - op Report consisted of patients Situation, Background, Assessment and  
Recommendations(SBAR). Information from the following report(s) SBAR, OR Summary, Procedure Summary and MAR was reviewed with the receiving nurse. Lines:  
Triple Lumen 10/08/18 Right Internal jugular (Active) Triple Lumen 10/08/18 Right Internal jugular (Active) Peripheral IV 10/08/18 Left Hand (Active) Site Assessment Clean, dry, & intact 10/8/2018  6:33 AM  
Phlebitis Assessment 0 10/8/2018  6:33 AM  
Infiltration Assessment 0 10/8/2018  6:33 AM  
Dressing Status Clean, dry, & intact 10/8/2018  6:33 AM  
Dressing Type Tape;Transparent 10/8/2018  6:33 AM  
Hub Color/Line Status Pink; Infusing 10/8/2018  6:33 AM  
   
Arterial Line 10/08/18 Right Radial artery (Active) Opportunity for questions and clarification was provided. Patient transported with: 
 Monitor O2 @ 10 liters Registered Nurse CRNA Perfussion

## 2018-10-09 ENCOUNTER — APPOINTMENT (OUTPATIENT)
Dept: GENERAL RADIOLOGY | Age: 58
DRG: 236 | End: 2018-10-09
Attending: PHYSICIAN ASSISTANT
Payer: COMMERCIAL

## 2018-10-09 LAB
ADMINISTERED INITIALS, ADMINIT: NORMAL
ALBUMIN SERPL-MCNC: 3.5 G/DL (ref 3.5–5)
ALBUMIN/GLOB SERPL: 1.6 {RATIO} (ref 1.1–2.2)
ALP SERPL-CCNC: 55 U/L (ref 45–117)
ALT SERPL-CCNC: 30 U/L (ref 12–78)
ANION GAP SERPL CALC-SCNC: 7 MMOL/L (ref 5–15)
AST SERPL-CCNC: 53 U/L (ref 15–37)
BASE DEFICIT BLDV-SCNC: 0.8 MMOL/L
BDY SITE: ABNORMAL
BILIRUB SERPL-MCNC: 0.6 MG/DL (ref 0.2–1)
BUN SERPL-MCNC: 15 MG/DL (ref 6–20)
BUN/CREAT SERPL: 15 (ref 12–20)
CALCIUM SERPL-MCNC: 8 MG/DL (ref 8.5–10.1)
CHLORIDE SERPL-SCNC: 110 MMOL/L (ref 97–108)
CO2 SERPL-SCNC: 23 MMOL/L (ref 21–32)
CREAT SERPL-MCNC: 0.98 MG/DL (ref 0.7–1.3)
D50 ADMINISTERED, D50ADM: 0 ML
D50 ORDER, D50ORD: 0 ML
ERYTHROCYTE [DISTWIDTH] IN BLOOD BY AUTOMATED COUNT: 13.2 % (ref 11.5–14.5)
GAS FLOW.O2 O2 DELIVERY SYS: 6 L/MIN
GLOBULIN SER CALC-MCNC: 2.2 G/DL (ref 2–4)
GLSCOM COMMENTS: NORMAL
GLUCOSE BLD STRIP.AUTO-MCNC: 101 MG/DL (ref 65–100)
GLUCOSE BLD STRIP.AUTO-MCNC: 104 MG/DL (ref 65–100)
GLUCOSE BLD STRIP.AUTO-MCNC: 105 MG/DL (ref 65–100)
GLUCOSE BLD STRIP.AUTO-MCNC: 106 MG/DL (ref 65–100)
GLUCOSE BLD STRIP.AUTO-MCNC: 108 MG/DL (ref 65–100)
GLUCOSE BLD STRIP.AUTO-MCNC: 110 MG/DL (ref 65–100)
GLUCOSE BLD STRIP.AUTO-MCNC: 115 MG/DL (ref 65–100)
GLUCOSE BLD STRIP.AUTO-MCNC: 120 MG/DL (ref 65–100)
GLUCOSE BLD STRIP.AUTO-MCNC: 133 MG/DL (ref 65–100)
GLUCOSE BLD STRIP.AUTO-MCNC: 135 MG/DL (ref 65–100)
GLUCOSE BLD STRIP.AUTO-MCNC: 151 MG/DL (ref 65–100)
GLUCOSE BLD STRIP.AUTO-MCNC: 84 MG/DL (ref 65–100)
GLUCOSE BLD STRIP.AUTO-MCNC: 90 MG/DL (ref 65–100)
GLUCOSE BLD STRIP.AUTO-MCNC: 94 MG/DL (ref 65–100)
GLUCOSE BLD STRIP.AUTO-MCNC: 98 MG/DL (ref 65–100)
GLUCOSE SERPL-MCNC: 97 MG/DL (ref 65–100)
GLUCOSE, GLC: 101 MG/DL
GLUCOSE, GLC: 104 MG/DL
GLUCOSE, GLC: 105 MG/DL
GLUCOSE, GLC: 106 MG/DL
GLUCOSE, GLC: 108 MG/DL
GLUCOSE, GLC: 110 MG/DL
GLUCOSE, GLC: 115 MG/DL
GLUCOSE, GLC: 120 MG/DL
GLUCOSE, GLC: 133 MG/DL
GLUCOSE, GLC: 135 MG/DL
GLUCOSE, GLC: 151 MG/DL
GLUCOSE, GLC: 84 MG/DL
GLUCOSE, GLC: 90 MG/DL
GLUCOSE, GLC: 94 MG/DL
GLUCOSE, GLC: 98 MG/DL
HCO3 BLDV-SCNC: 24 MMOL/L (ref 23–28)
HCT VFR BLD AUTO: 31 % (ref 36.6–50.3)
HGB BLD-MCNC: 10.6 G/DL (ref 12.1–17)
HIGH TARGET, HITG: 130 MG/DL
INSULIN ADMINSTERED, INSADM: 1.5 UNITS/HOUR
INSULIN ADMINSTERED, INSADM: 1.8 UNITS/HOUR
INSULIN ADMINSTERED, INSADM: 2 UNITS/HOUR
INSULIN ADMINSTERED, INSADM: 2.6 UNITS/HOUR
INSULIN ADMINSTERED, INSADM: 2.8 UNITS/HOUR
INSULIN ADMINSTERED, INSADM: 3 UNITS/HOUR
INSULIN ADMINSTERED, INSADM: 3 UNITS/HOUR
INSULIN ADMINSTERED, INSADM: 3.4 UNITS/HOUR
INSULIN ADMINSTERED, INSADM: 3.5 UNITS/HOUR
INSULIN ADMINSTERED, INSADM: 3.6 UNITS/HOUR
INSULIN ADMINSTERED, INSADM: 3.7 UNITS/HOUR
INSULIN ADMINSTERED, INSADM: 3.7 UNITS/HOUR
INSULIN ADMINSTERED, INSADM: 4 UNITS/HOUR
INSULIN ADMINSTERED, INSADM: 4.1 UNITS/HOUR
INSULIN ADMINSTERED, INSADM: 4.4 UNITS/HOUR
INSULIN ADMINSTERED, INSADM: 5.4 UNITS/HOUR
INSULIN ADMINSTERED, INSADM: 6.3 UNITS/HOUR
INSULIN ADMINSTERED, INSADM: 6.7 UNITS/HOUR
INSULIN ORDER, INSORD: 1.5 UNITS/HOUR
INSULIN ORDER, INSORD: 1.8 UNITS/HOUR
INSULIN ORDER, INSORD: 2 UNITS/HOUR
INSULIN ORDER, INSORD: 2.6 UNITS/HOUR
INSULIN ORDER, INSORD: 2.8 UNITS/HOUR
INSULIN ORDER, INSORD: 3 UNITS/HOUR
INSULIN ORDER, INSORD: 3 UNITS/HOUR
INSULIN ORDER, INSORD: 3.4 UNITS/HOUR
INSULIN ORDER, INSORD: 3.5 UNITS/HOUR
INSULIN ORDER, INSORD: 3.6 UNITS/HOUR
INSULIN ORDER, INSORD: 3.7 UNITS/HOUR
INSULIN ORDER, INSORD: 3.7 UNITS/HOUR
INSULIN ORDER, INSORD: 4 UNITS/HOUR
INSULIN ORDER, INSORD: 4.1 UNITS/HOUR
INSULIN ORDER, INSORD: 4.4 UNITS/HOUR
INSULIN ORDER, INSORD: 5.4 UNITS/HOUR
INSULIN ORDER, INSORD: 6.3 UNITS/HOUR
INSULIN ORDER, INSORD: 6.7 UNITS/HOUR
LOW TARGET, LOT: 95 MG/DL
MAGNESIUM SERPL-MCNC: 2.2 MG/DL (ref 1.6–2.4)
MCH RBC QN AUTO: 29.4 PG (ref 26–34)
MCHC RBC AUTO-ENTMCNC: 34.2 G/DL (ref 30–36.5)
MCV RBC AUTO: 86.1 FL (ref 80–99)
MINUTES UNTIL NEXT BG, NBG: 120 MIN
MINUTES UNTIL NEXT BG, NBG: 60 MIN
MULTIPLIER, MUL: 0.05
MULTIPLIER, MUL: 0.06
MULTIPLIER, MUL: 0.06
MULTIPLIER, MUL: 0.07
MULTIPLIER, MUL: 0.08
NRBC # BLD: 0 K/UL (ref 0–0.01)
NRBC BLD-RTO: 0 PER 100 WBC
ORDER INITIALS, ORDINIT: NORMAL
PCO2 BLDV: 42 MMHG (ref 41–51)
PH BLDV: 7.38 [PH] (ref 7.32–7.42)
PLATELET # BLD AUTO: 167 K/UL (ref 150–400)
PMV BLD AUTO: 10.3 FL (ref 8.9–12.9)
PO2 BLDV: 30 MMHG (ref 25–40)
POTASSIUM SERPL-SCNC: 4.4 MMOL/L (ref 3.5–5.1)
PROT SERPL-MCNC: 5.7 G/DL (ref 6.4–8.2)
RBC # BLD AUTO: 3.6 M/UL (ref 4.1–5.7)
SAO2 % BLDV: 56 % (ref 65–88)
SAO2% DEVICE SAO2% SENSOR NAME: ABNORMAL
SERVICE CMNT-IMP: ABNORMAL
SERVICE CMNT-IMP: NORMAL
SODIUM SERPL-SCNC: 140 MMOL/L (ref 136–145)
SPECIMEN SITE: ABNORMAL
WBC # BLD AUTO: 15.3 K/UL (ref 4.1–11.1)

## 2018-10-09 PROCEDURE — 97116 GAIT TRAINING THERAPY: CPT

## 2018-10-09 PROCEDURE — G8978 MOBILITY CURRENT STATUS: HCPCS

## 2018-10-09 PROCEDURE — G8979 MOBILITY GOAL STATUS: HCPCS

## 2018-10-09 PROCEDURE — 74011636637 HC RX REV CODE- 636/637: Performed by: PHYSICIAN ASSISTANT

## 2018-10-09 PROCEDURE — 74011250637 HC RX REV CODE- 250/637: Performed by: PHYSICIAN ASSISTANT

## 2018-10-09 PROCEDURE — 74011250636 HC RX REV CODE- 250/636: Performed by: PHYSICIAN ASSISTANT

## 2018-10-09 PROCEDURE — 74011000250 HC RX REV CODE- 250: Performed by: PHYSICIAN ASSISTANT

## 2018-10-09 PROCEDURE — 97530 THERAPEUTIC ACTIVITIES: CPT

## 2018-10-09 PROCEDURE — 83735 ASSAY OF MAGNESIUM: CPT | Performed by: PHYSICIAN ASSISTANT

## 2018-10-09 PROCEDURE — G8987 SELF CARE CURRENT STATUS: HCPCS

## 2018-10-09 PROCEDURE — 85027 COMPLETE CBC AUTOMATED: CPT | Performed by: PHYSICIAN ASSISTANT

## 2018-10-09 PROCEDURE — 97162 PT EVAL MOD COMPLEX 30 MIN: CPT

## 2018-10-09 PROCEDURE — 74011250636 HC RX REV CODE- 250/636: Performed by: THORACIC SURGERY (CARDIOTHORACIC VASCULAR SURGERY)

## 2018-10-09 PROCEDURE — 82962 GLUCOSE BLOOD TEST: CPT

## 2018-10-09 PROCEDURE — 82803 BLOOD GASES ANY COMBINATION: CPT | Performed by: THORACIC SURGERY (CARDIOTHORACIC VASCULAR SURGERY)

## 2018-10-09 PROCEDURE — 36415 COLL VENOUS BLD VENIPUNCTURE: CPT | Performed by: PHYSICIAN ASSISTANT

## 2018-10-09 PROCEDURE — 74011000258 HC RX REV CODE- 258: Performed by: THORACIC SURGERY (CARDIOTHORACIC VASCULAR SURGERY)

## 2018-10-09 PROCEDURE — 97165 OT EVAL LOW COMPLEX 30 MIN: CPT

## 2018-10-09 PROCEDURE — 77010033678 HC OXYGEN DAILY

## 2018-10-09 PROCEDURE — 93005 ELECTROCARDIOGRAM TRACING: CPT

## 2018-10-09 PROCEDURE — 80053 COMPREHEN METABOLIC PANEL: CPT | Performed by: PHYSICIAN ASSISTANT

## 2018-10-09 PROCEDURE — G8988 SELF CARE GOAL STATUS: HCPCS

## 2018-10-09 PROCEDURE — 97535 SELF CARE MNGMENT TRAINING: CPT

## 2018-10-09 PROCEDURE — 71045 X-RAY EXAM CHEST 1 VIEW: CPT

## 2018-10-09 PROCEDURE — 74011000258 HC RX REV CODE- 258: Performed by: PHYSICIAN ASSISTANT

## 2018-10-09 PROCEDURE — 65620000000 HC RM CCU GENERAL

## 2018-10-09 PROCEDURE — 74011000250 HC RX REV CODE- 250: Performed by: THORACIC SURGERY (CARDIOTHORACIC VASCULAR SURGERY)

## 2018-10-09 PROCEDURE — 77030013797 HC KT TRNSDUC PRSSR EDWD -A

## 2018-10-09 PROCEDURE — P9045 ALBUMIN (HUMAN), 5%, 250 ML: HCPCS | Performed by: PHYSICIAN ASSISTANT

## 2018-10-09 RX ORDER — KETOROLAC TROMETHAMINE 30 MG/ML
15 INJECTION, SOLUTION INTRAMUSCULAR; INTRAVENOUS EVERY 6 HOURS
Status: DISCONTINUED | OUTPATIENT
Start: 2018-10-09 | End: 2018-10-09 | Stop reason: SDUPTHER

## 2018-10-09 RX ORDER — FUROSEMIDE 10 MG/ML
20 INJECTION INTRAMUSCULAR; INTRAVENOUS ONCE
Status: COMPLETED | OUTPATIENT
Start: 2018-10-10 | End: 2018-10-10

## 2018-10-09 RX ORDER — FUROSEMIDE 10 MG/ML
10 INJECTION INTRAMUSCULAR; INTRAVENOUS ONCE
Status: COMPLETED | OUTPATIENT
Start: 2018-10-09 | End: 2018-10-09

## 2018-10-09 RX ORDER — ENOXAPARIN SODIUM 100 MG/ML
40 INJECTION SUBCUTANEOUS EVERY 24 HOURS
Status: DISCONTINUED | OUTPATIENT
Start: 2018-10-10 | End: 2018-10-12 | Stop reason: HOSPADM

## 2018-10-09 RX ORDER — KETOROLAC TROMETHAMINE 30 MG/ML
30 INJECTION, SOLUTION INTRAMUSCULAR; INTRAVENOUS
Status: COMPLETED | OUTPATIENT
Start: 2018-10-09 | End: 2018-10-09

## 2018-10-09 RX ORDER — KETOROLAC TROMETHAMINE 30 MG/ML
15 INJECTION, SOLUTION INTRAMUSCULAR; INTRAVENOUS EVERY 6 HOURS
Status: COMPLETED | OUTPATIENT
Start: 2018-10-09 | End: 2018-10-10

## 2018-10-09 RX ADMIN — ATORVASTATIN CALCIUM 20 MG: 20 TABLET, FILM COATED ORAL at 09:15

## 2018-10-09 RX ADMIN — OXYCODONE HYDROCHLORIDE AND ACETAMINOPHEN 1000 MG: 500 TABLET ORAL at 09:16

## 2018-10-09 RX ADMIN — SODIUM CHLORIDE 4 UNITS/HR: 900 INJECTION, SOLUTION INTRAVENOUS at 10:32

## 2018-10-09 RX ADMIN — ACETAMINOPHEN 1000 MG: 10 INJECTION, SOLUTION INTRAVENOUS at 08:16

## 2018-10-09 RX ADMIN — OXYCODONE HYDROCHLORIDE 10 MG: 5 TABLET ORAL at 21:55

## 2018-10-09 RX ADMIN — SODIUM CHLORIDE 4.4 UNITS/HR: 900 INJECTION, SOLUTION INTRAVENOUS at 16:25

## 2018-10-09 RX ADMIN — SODIUM CHLORIDE 3 UNITS/HR: 900 INJECTION, SOLUTION INTRAVENOUS at 14:58

## 2018-10-09 RX ADMIN — Medication 10 ML: at 06:00

## 2018-10-09 RX ADMIN — MORPHINE SULFATE 2 MG: 2 INJECTION, SOLUTION INTRAMUSCULAR; INTRAVENOUS at 17:06

## 2018-10-09 RX ADMIN — Medication 2 G: at 13:43

## 2018-10-09 RX ADMIN — ALBUMIN (HUMAN) 12.5 G: 12.5 INJECTION, SOLUTION INTRAVENOUS at 13:23

## 2018-10-09 RX ADMIN — ENOXAPARIN SODIUM 40 MG: 40 INJECTION, SOLUTION INTRAVENOUS; SUBCUTANEOUS at 23:39

## 2018-10-09 RX ADMIN — SENNOSIDES AND DOCUSATE SODIUM 1 TABLET: 8.6; 5 TABLET ORAL at 09:15

## 2018-10-09 RX ADMIN — SODIUM CHLORIDE 3 UNITS/HR: 900 INJECTION, SOLUTION INTRAVENOUS at 11:42

## 2018-10-09 RX ADMIN — KETOROLAC TROMETHAMINE 15 MG: 30 INJECTION, SOLUTION INTRAMUSCULAR at 20:06

## 2018-10-09 RX ADMIN — ALBUMIN (HUMAN) 12.5 G: 12.5 INJECTION, SOLUTION INTRAVENOUS at 04:18

## 2018-10-09 RX ADMIN — POLYETHYLENE GLYCOL 3350 34 G: 17 POWDER, FOR SOLUTION ORAL at 09:21

## 2018-10-09 RX ADMIN — ACETAMINOPHEN 1000 MG: 10 INJECTION, SOLUTION INTRAVENOUS at 02:29

## 2018-10-09 RX ADMIN — Medication 2 G: at 08:12

## 2018-10-09 RX ADMIN — SODIUM CHLORIDE 6.7 UNITS/HR: 900 INJECTION, SOLUTION INTRAVENOUS at 18:35

## 2018-10-09 RX ADMIN — AMIODARONE HYDROCHLORIDE 400 MG: 200 TABLET ORAL at 17:06

## 2018-10-09 RX ADMIN — Medication 2 G: at 02:27

## 2018-10-09 RX ADMIN — FUROSEMIDE 10 MG: 10 INJECTION, SOLUTION INTRAMUSCULAR; INTRAVENOUS at 18:37

## 2018-10-09 RX ADMIN — ALBUMIN (HUMAN) 12.5 G: 12.5 INJECTION, SOLUTION INTRAVENOUS at 11:08

## 2018-10-09 RX ADMIN — AMIODARONE HYDROCHLORIDE 0.5 MG/MIN: 50 INJECTION, SOLUTION INTRAVENOUS at 01:18

## 2018-10-09 RX ADMIN — AMIODARONE HYDROCHLORIDE 400 MG: 200 TABLET ORAL at 09:16

## 2018-10-09 RX ADMIN — ASPIRIN 81 MG CHEWABLE TABLET 81 MG: 81 TABLET CHEWABLE at 09:15

## 2018-10-09 RX ADMIN — MORPHINE SULFATE 2 MG: 2 INJECTION, SOLUTION INTRAMUSCULAR; INTRAVENOUS at 11:13

## 2018-10-09 RX ADMIN — MORPHINE SULFATE 2 MG: 2 INJECTION, SOLUTION INTRAMUSCULAR; INTRAVENOUS at 06:33

## 2018-10-09 RX ADMIN — CHLORHEXIDINE GLUCONATE 10 ML: 1.2 RINSE ORAL at 17:11

## 2018-10-09 RX ADMIN — SODIUM CHLORIDE 3.7 UNITS/HR: 900 INJECTION, SOLUTION INTRAVENOUS at 02:24

## 2018-10-09 RX ADMIN — ACETAMINOPHEN 650 MG: 325 TABLET ORAL at 22:45

## 2018-10-09 RX ADMIN — Medication 400 MG: at 09:15

## 2018-10-09 RX ADMIN — KETOROLAC TROMETHAMINE 15 MG: 30 INJECTION, SOLUTION INTRAMUSCULAR at 14:38

## 2018-10-09 RX ADMIN — OXYCODONE HYDROCHLORIDE 10 MG: 5 TABLET ORAL at 02:29

## 2018-10-09 RX ADMIN — OXYCODONE HYDROCHLORIDE 5 MG: 5 TABLET ORAL at 15:25

## 2018-10-09 RX ADMIN — ALBUMIN (HUMAN) 12.5 G: 12.5 INJECTION, SOLUTION INTRAVENOUS at 15:17

## 2018-10-09 RX ADMIN — Medication 10 ML: at 14:39

## 2018-10-09 RX ADMIN — SODIUM CHLORIDE 2.8 UNITS/HR: 900 INJECTION, SOLUTION INTRAVENOUS at 13:51

## 2018-10-09 RX ADMIN — MUPIROCIN: 20 OINTMENT TOPICAL at 09:27

## 2018-10-09 RX ADMIN — KETOROLAC TROMETHAMINE 30 MG: 30 INJECTION, SOLUTION INTRAMUSCULAR at 08:11

## 2018-10-09 RX ADMIN — SODIUM CHLORIDE 3.7 UNITS/HR: 900 INJECTION, SOLUTION INTRAVENOUS at 12:42

## 2018-10-09 RX ADMIN — MORPHINE SULFATE 2 MG: 2 INJECTION, SOLUTION INTRAMUSCULAR; INTRAVENOUS at 09:13

## 2018-10-09 RX ADMIN — CHLORHEXIDINE GLUCONATE 10 ML: 1.2 RINSE ORAL at 09:26

## 2018-10-09 RX ADMIN — Medication 10 ML: at 22:45

## 2018-10-09 RX ADMIN — Medication 2 G: at 20:06

## 2018-10-09 RX ADMIN — OXYCODONE HYDROCHLORIDE 10 MG: 5 TABLET ORAL at 18:43

## 2018-10-09 RX ADMIN — MUPIROCIN: 20 OINTMENT TOPICAL at 20:07

## 2018-10-09 RX ADMIN — OXYCODONE HYDROCHLORIDE 10 MG: 5 TABLET ORAL at 06:58

## 2018-10-09 RX ADMIN — SODIUM CHLORIDE 0.4 MCG/KG/HR: 900 INJECTION, SOLUTION INTRAVENOUS at 02:23

## 2018-10-09 RX ADMIN — Medication 400 MG: at 17:07

## 2018-10-09 NOTE — DIABETES MGMT
[facility] Cardiac Surgery Progress Note Recommendations/ Comments:  Pt arrived to CCU at 1423 10/8/18. Consider continuing insulin gtt for at least 48hrs post-op and eating 50% solid foods then, 
1) transition off gtt per [employer] Protocol 2) continue accu-checks and humalog correctional insulin ac & hs  
3) ADA/AHA diet as diet advanced 4) resume tradjenta 5mg Qam beginning tomorrow (hospital formulary for onglyza) 5) resume metformin 1000mg ac b/d beginning tomorrow Insulin gtt should not be stopped until after 1420 10/10/18 to complete 48hr post-op time frame. Pt could receive transition as early as [phone] 10/10/18 if all criteria met per protocol. D[facility] will f/u with pt for post-op education/reinforcement of previous education taught pre-op. Currently on insulin gtt. Chart reviewed on Florian Grgigs Sr. 
 
Patient is 63 y.o. male s/p Cardiac surgery  POD 1. Known Type 2 Diabetes on onglyza 5mg daily and metformin 1000mg ac b/d at home. A1c:  
Lab Results Component Value Date/Time Hemoglobin A1c 7.0 (H) 10/04/2018 02:12 PM  
 
 
 
Recent Glucose Results:  
Lab Results Component Value Date/Time GLU 97 10/09/2018 04:30 AM  
  (H) 10/08/2018 07:34 PM  
  (H) 10/08/2018 02:50 PM  
 GLUCPOC 110 (H) 10/09/2018 12:41 PM  
 GLUCPOC 101 (H) 10/09/2018 11:36 AM  
 GLUCPOC 115 (H) 10/09/2018 10:30 AM  
  
 
Lab Results Component Value Date/Time Creatinine 0.98 10/09/2018 04:30 AM  
 
Estimated Creatinine Clearance: 114 mL/min (based on Cr of 0.98). Active Orders Diet DIET CLEAR LIQUID  
  
 
PO intake: No data found. Will continue to follow as needed. Thank you. Jagdeep Gomez, BSN, RN, CDE [facility]

## 2018-10-09 NOTE — PROGRESS NOTES
Cardiac Surgery ICU Progress Note Admit Date: 10/8/2018 POD: 1 Day Post-Op Problems:  Active Problems: 
  CAD (Coronary Artery Disease) - stent 1999 (10/20/2009) CAD (coronary artery disease), native coronary artery (10/8/2018) Procedure:  Procedure(s): CORONARY ARTERY BYPASS GRAFT TIMES THREE  WITH RIGHT SAPHENOUS VEIN GRAFT ( ENDOSCOPIC HARVEST ) AND LEFT INTERNAL MAMMARY ARTERY WITH EXTRA CORPOREAL CIRCULATION. TRACI AND EPIAORTIC ULTRAOUND DONE BY DR Mary Ennisspmg Pearson. Summary:  
 
Stable hemodynamics in sinus rhythm without ectopy on Tyrell 20 Inadequate pain control OOB to chair WBC 15 HH 10/31  CXR: bibasilar atel  300/12 hrs Cr 0.9 
UOP 1600 Plan/Recommendations/Medical Decision Making:  
 
DC central lines with SVO2 > 55% and off Tyrell 
and mera Toradol for pain Wean Venti mask as tolerated Meds: aspirin/statin B-Blocker held for bradycardia ACE held for hypotension Anticipated acute blood loss anemia Increase activity Increase I/S effort and frequency Sternal precautions Stimulate bowel movement Patient seen and reviewed with  Dr. Kenyetta Prince MD 
 
 
 Objective: CXR Results  (Last 48 hours) 10/09/18 0510  XR CHEST PORT Final result Impression:  IMPRESSION: No significant change following extubation and removal of  
nasogastric tube. Narrative:  EXAM:  XR CHEST PORT. INDICATION: Postop heart. COMPARISON: 10/8/2018. FINDINGS:   
A portable AP radiograph of the chest was obtained at 0457 hours. There are  
sternal sutures. Lines and tubes: The patient is on a cardiac monitor. The endotracheal tube and  
nasogastric tube have been removed. The right jugular triple-lumen and [place]-Maddie  
catheters are unchanged in position. The mediastinal drain and chest tubes are  
also unchanged. Lungs: There is atelectasis in the lower lung zones, unchanged. Pleura: There is no pneumothorax or pleural effusion. Mediastinum: The cardiac silhouette is enlarged. Bones and soft tissues: The bones and soft tissues are grossly within normal  
limits. 10/08/18 1508  XR CHEST PORT Final result Impression:  IMPRESSION:  
   
ETT in satisfactory position. Hypoexpanded lungs. Central vascular plethora presumably in part related to shallow ventilation. Scattered bilateral subsegmental atelectasis. Narrative:  INDICATION: Postop. Coronary artery disease. EXAM:  
   
Portable AP chest radiograph was obtained at 1443 hours on October 8, 2018. FINDINGS:  
   
Comparison studies:  October 4, 2018. The heart is top normal  in size. The lungs are hypo- expanded bilaterally. The visualized bones are grossly within normal limits. Tip of endotracheal tube is in satisfactory position. Nasogastric catheter is in  
place but its tip not visualized on this exam. [place]-Maddie catheter tip projected  
over the right intrapericardial pulmonary artery. Central pulmonary vascular  
plethora and scattered bilateral subsegmental atelectasis. Pleural/mediastinal  
drains are in place. Central line tip over caval-atrial junction region. Labs: Recent Labs 10/09/18 
 0748   10/09/18 
 0430   10/08/18 
 1450 WBC   --    --   15.3*   --   9.7 HGB   --    --   10.6*   < >  10.7* HCT   --    --   31.0*   < >  31.2*  
PLT   --    --   167   --   130* NA   --    --   140   < >  143 K   --    --   4.4   < >  4.1 BUN   --    --   15   < >  11  
CREA   --    --   0.98   < >  1.01  
GLU   --    --   97   < >  129* GLUCPOC  101*   < >   --    < >   --   
INR   --    --    --    --   1.2*  
 < > = values in this interval not displayed. Vitals: 
 
Visit Vitals  /68  Pulse 82  Temp 100.3 °F (37.9 °C)  Resp 29  
 Ht 6' 1\" (1.854 m)  Wt 270 lb 1 oz (122.5 kg)  SpO2 90%  BMI 35.63 kg/m2 Temp (24hrs), Av.6 °F (37 °C), Min:96.5 °F (35.8 °C), Max:100.3 °F (37.9 °C) Last 3 Recorded Weights in this Encounter 10/07/18 1751 10/08/18 0544 Weight: 275 lb 9.2 oz (125 kg) 270 lb 1 oz (122.5 kg) Hemodynamics: 
 CO: CO (l/min): 6.1 l/min CI: CI (l/min/m2): 4.7 l/min/m2 CVP: CVP (mmHg): 19 mmHg (10/09/18 0800) SVR: SVR (dyne*sec)/cm5: 867 (dyne*sec)/cm5 (10/09/18 0100) PAP Systolic: PAP Systolic: 42 ( 6804) PAP Diastolic: PAP Diastolic: 20 ( 0738) PVR:   
 SV02: SVO2 (%): 49 % (10/09/18 0800) SCV02:   
 
Ventilator: 
Ventilator Volumes Vt Set (ml): 650 ml (10/08/18 142) Vt Exhaled (Machine Breath) (ml): 700 ml (10/08/18 1428) Vt Spont (ml): 973 ml (10/08/18 1629) Ve Observed (l/min): 12 l/min (10/08/18 1629) Oxygen Therapy: 
Oxygen Therapy O2 Sat (%): 90 % (10/09/18 0800) Pulse via Oximetry: 82 beats per minute (10/09/18 0800) O2 Device: Nasal cannula;Ventimask (10/09/18 0800) O2 Flow Rate (L/min): 6 l/min (10/09/18 0800) FIO2 (%): 50 % (10/09/18 0800) Admission Weight: Last Weight Weight: 275 lb 9.2 oz (125 kg) Weight: 270 lb 1 oz (122.5 kg) Intake / Output / Drain: 
Current Shift: 10/09 0701 - 10/09 1900 In: -  
Out: [de-identified] [Urine:45; Drains:35] Last 24 hrs.:  
Intake/Output Summary (Last 24 hours) at 10/09/18 0728 Last data filed at 10/09/18 0800 Gross per 24 hour Intake          4219.24 ml Output             2389 ml Net          1830.24 ml EXAM:   
  General:OOB to chair, inadequate pain control Chest:  Stable sternum Incisions: dry and intact Lungs:    Rhonchi bilaterally. Heart:  Regular rate and rhythm, S1, S2 normal, no murmur, no click, Abdomen:   Soft, non-tender. Bowel sounds present. Extremities:  mild edema Neurologic:  Gross motor and sensory apparatus intact.  
  
 
Signed By: LÓPEZ Hearn

## 2018-10-09 NOTE — PROGRESS NOTES
Problem: Mobility Impaired (Adult and Pediatric) Goal: *Acute Goals and Plan of Care (Insert Text) Physical Therapy Goals Initiated 10/9/2018 1. Patient will move from supine to sit and sit to supine , scoot up and down and roll side to side in bed with modified independence within 5 days. 2.  Patient will perform sit to/from stand with modified independence within 5 days. 3.  Patient will ambulate 400 feet with least restrictive assistive device and independence within 5 days. 4.  Patient will ascend/descend 14 stairs with 1 handrail(s) with modified independence within 5 days. 5.  Patient will perform cardiac exercises per protocol with independence within 5 days. 6.  Patient will verbally and functionally recall 3/3 sternal precautions within 5 days. physical Therapy TREATMENT Patient: Antony Summers (63 y.o. male) Date: 10/9/2018 Diagnosis: CAD 
CAD (coronary artery disease) CAD (coronary artery disease), native coronary artery <principal problem not specified> Procedure(s) (LRB): 
CORONARY ARTERY BYPASS GRAFT TIMES THREE  WITH RIGHT SAPHENOUS VEIN GRAFT ( ENDOSCOPIC HARVEST ) AND LEFT INTERNAL MAMMARY ARTERY WITH EXTRA CORPOREAL CIRCULATION. TRACI AND EPIAORTIC ULTRAOUND DONE BY DR SANDOVAL [facility]. (N/A) 1 Day Post-Op Precautions: Fall, Sternal 
Chart, physical therapy assessment, plan of care and goals were reviewed. ASSESSMENT: Seen for PM session for gait training Pt received sitting in bedside chair on 5 L/min O2 NC with nursing and family present and agreeable to PT intervention. Pt cleared by nursing for mobility. Pt unable to recall sternal precautions and was re-educated on 3/3 sternal precautions. Pt with good adherence to precautions throughout session. Needed min-CGA to scoot forward in chair and CGA x 2 for sit<>stand transfers.  He ambulated 100 ft with CGA while pushing cardiac cart, however demonstrates slow gait speed, widened SAWYER, shuffled gait, and increased trunk sway throughout. Pt needed three brief standing rest breaks due to increased pain. Reported 3/10 pain at rest, however pain increased to 5/10 with activity. SaO2 86-93% on 5-6 L/min O2 NC (on 6 L/min O2 NC during ambulation). Reviewed cardiac exercises. Pt was left sitting in bedside chair with all needs met, RN aware, family present, and VSS on 5 L/min O2 NC following therapy session. Recommend pt discharge home with family support and HHPT pending progress in acute PT. Progression toward goals: 
[]      Improving appropriately and progressing toward goals [x]      Improving slowly and progressing toward goals 
[]      Not making progress toward goals and plan of care will be adjusted PLAN: 
Patient continues to benefit from skilled intervention to address the above impairments. Continue treatment per established plan of care. Discharge Recommendations:  Home Health Further Equipment Recommendations for Discharge:  TBD - likely none SUBJECTIVE:  
Patient stated This actually feels better.  The patient stated 0/3 sternal precautions. Reviewed all 3 with patient. OBJECTIVE DATA SUMMARY:  
Patient mobilized on continuous portable monitor/telemetry. Critical Behavior: 
Neurologic State: Appropriate for age Orientation Level: Oriented X4 Cognition: Follows commands Safety/Judgement: Awareness of environment, Fall prevention Functional Mobility Training: 
Bed Mobility: 
Log Rolling: Minimum assistance;Assist x2 Sit to Supine: Moderate assistance Scooting: Contact guard assistance;Minimum assistance;Assist x1 Transfers: 
Sit to Stand: Contact guard assistance;Assist x2 Stand to Sit: Contact guard assistance Bed to Chair: Minimum assistance;Assist x2 Balance: 
Sitting: Intact Standing: Impaired; With support Standing - Static: Good Standing - Dynamic : Fair Ambulation/Gait Training: 
Distance (ft): 100 Feet (ft) Assistive Device: Gait belt (pushing cardiac cart) Ambulation - Level of Assistance: Contact guard assistance;Assist x1;Additional time Gait Description (WDL): Exceptions to [facility] Gait Abnormalities: Decreased step clearance;Trunk sway increased Base of Support: Widened Speed/Guerita: Pace decreased (<100 feet/min) Step Length: Left shortened;Right shortened Pain: 
Pain Scale 1: Numeric (0 - 10) Pain Intensity 1: 3-5 Pain Location 1: Chest 
Pain Orientation 1: Anterior;Mid 
Pain Description 1: Aching; Sore Pain Intervention(s) 1: Medication (see MAR) Activity Tolerance:  
Fair/improving - increased gait distance; SaO2 86-93% on 5-6 L/min O2 NC; BP and HR stable; pain 3/10 at rest and 5/10 with activity Please refer to the flowsheet for vital signs taken during this treatment. After treatment:  
[x] Patient left in no apparent distress sitting up in chair 
[] Patient left in no apparent distress in bed 
[x] Call bell left within reach [x] Nursing notified 
[x] Caregiver present 
[] Bed alarm activated COMMUNICATION/COLLABORATION:  
The patients plan of care was discussed with: Physical Therapist and Registered Nurse Bethany Hill PT, DPT Time Calculation: 26 mins

## 2018-10-09 NOTE — PROGRESS NOTES
[facility] Consult Service Progress NOTEPulmonary, Critical Care, and Sleep Medicine Name: Gerald Young MRN: [id number] : 1960 Hospital: Critical access hospital Date: 10/9/2018  Admission Date: 10/8/2018 Hospital Day: 2 Chart and notes reviewed. Data reviewed. I have evaluated and examined the patient. Successfully extubated, but he is having a lot of pain, leading to increased O2 requirements IMPRESSION:  
1. CAD 2. S/p CABG 10/9/2018 3. Coronary Artery Bypass Grafting x 3, LIMA to LAD, RSVG Sequenced to PDA, OM; Left GSV [facility] 4. Post op hypoxia 5. Diabetes ([address] 6. HTN 
7. GERD (gastroesophageal reflux disease) 8. Gout 9. Sleep apnea untreated 10. Morbid obesity 11. Heart attack ([id number] stents placed 12. 12 Cans of beer per week Comment: 2 beers per week 13. Smoker Packs/day:0.25 RECOMMENDATIONS/PLAN:  
1. O2 
2. Pain control 3. Consider diuresis 4. Bronchodilators prn 5. Post op care per protocol 6. Glycemic control 7. Hemodynamics per cardiac surgery 8. Chest tubes in place 9. Smoking cessation counseling when able 10. PT, OT 11. DVT, SUP prophylaxiss My assessment/management discussed with: Cardiothoracic surgery, Cardiology, Consultants, Nursing, Pharmacy, Case Management, PT, OT, Respiratory Therapy, Nutrition, Diabetes specialist and Family for coordination of care Pt's condition is acute and unstable requiring inpatient hospitalization. This care involved high complexity decision making which includes independently reviewing the patient's past medical records, current laboratory results, medication profiles that were immediately available to me and actual Xray images at the bedside in order to assess, support vital system function, and to treat this degree of vital organ system failure, and to prevent further deterioration of the patients condition. Risk of deterioration: high [x] High complexity decision making was performed [x] See my orders for details Social History Substance Use Topics  Smoking status: Light Tobacco Smoker Packs/day: 0.25 Years: 10.00 Types: Cigarettes  Smokeless tobacco: Never Used  Alcohol use Yes Comment: 22 drinks per week Family History Problem Relation Age of Onset  Diabetes Mother  No Known Problems Father No Known Allergies MAR reviewed and pertinent medications noted or modified as needed Current Facility-Administered Medications Medication  PHENYLephrine (KATHRINE-SYNEPHRINE) 30 mg in 0.9% sodium chloride 250 mL infusion  ascorbic acid (vitamin C) (VITAMIN C) tablet 1,000 mg  mupirocin (BACTROBAN) 2 % ointment  acetaminophen (OFIRMEV) infusion 1,000 mg  
 calcium chloride 1 g in 0.9% sodium chloride 250 mL IVPB  oxyCODONE IR (ROXICODONE) tablet 5-10 mg  
 sodium chloride (NS) flush 5-10 mL  sodium chloride (NS) flush 5-10 mL  albumin human 5% (BUMINATE) solution 12.5 g  
 0.45% sodium chloride infusion  
 0.9% sodium chloride infusion  acetaminophen (TYLENOL) tablet 650 mg  
 morphine injection 1-2 mg  
 naloxone (NARCAN) injection 0.4 mg  
 ceFAZolin (ANCEF) 2 g/20 mL in sterile water IV syringe  amiodarone (CORDARONE) tablet 400 mg  
 atorvastatin (LIPITOR) tablet 20 mg  
 ondansetron (ZOFRAN) injection 4 mg  albuterol (PROVENTIL VENTOLIN) nebulizer solution 2.5 mg  
 DOBUTamine (DOBUTREX) 2,000 mcg/ml infusion  aspirin chewable tablet 81 mg  
 chlorhexidine (PERIDEX) 0.12 % mouthwash 10 mL  magnesium oxide (MAG-OX) tablet 400 mg  polyethylene glycol (MIRALAX) packet 34 g  
 senna-docusate (PERICOLACE) 8.6-50 mg per tablet 1 Tab  ELECTROLYTE REPLACEMENT PROTOCOL  magnesium sulfate 1 g/100 ml IVPB (premix or compounded)  insulin regular (NOVOLIN R, HUMULIN R) 100 Units in 0.9% sodium chloride 100 mL infusion  glucose chewable tablet 16 g  dextrose (D50W) injection syrg 12.5-25 g  
 glucagon (GLUCAGEN) injection 1 mg  
 insulin lispro (HUMALOG) injection  [START ON 10/10/2018] insulin lispro (HUMALOG) injection  amiodarone (CORDARONE) 900 mg/250 ml D5W infusion  dexmedeTOMidine (PRECEDEX) 400 mcg in 0.9% sodium chloride 100 mL infusion Vital Signs: Intake/Output: Intake/Output:  
Visit Vitals  BP 90/57  Pulse 71  Temp 99.7 °F (37.6 °C)  Resp 25  
 Ht 6' 1\" (1.854 m)  Wt 122.5 kg (270 lb 1 oz)  SpO2 (!) 86%  BMI 35.63 kg/m2 Temp (24hrs), Av.5 °F (36.9 °C), Min:96.5 °F (35.8 °C), Max:99.8 °F (37.7 °C) Telemetry: PACED O2 Device: Nasal cannula O2 Flow Rate (L/min): 6 l/min Wt Readings from Last 4 Encounters:  
10/08/18 122.5 kg (270 lb 1 oz) 10/04/18 125 kg (275 lb 9.2 oz)  
10/02/18 122.5 kg (270 lb)  
18 124.3 kg (274 lb) Intake/Output Summary (Last 24 hours) at 10/09/18 0715 Last data filed at 10/09/18 0600 Gross per 24 hour Intake          4219.24 ml Output             2234 ml Net          1985.24 ml Last shift:        
 
Last 3 shifts: 10/07 1901 - 10/09 0700 In: 4219.2 [P.O.:480; I.V.:3064.2] Out: 2234 [ILOBN:6596; Drains:580] Physical Exam:  
 General: severely ill;   male HEAD: Normocephalic, without obvious abnormality, atraumatic EYES:anicteric NOSE: nares normal, no drainage, no flaring, THROAT: mucous membranes dry; Lips, mucosa dry; intubated Neck: Supple, symmetrical, trachea midline Lungs: decreased air exchange bilaterally with few basilar rales Heart: Regular rate and rhythm, no murmur Abdomen: soft, protuberant Extremity: negative, cyanosis, clubbing Neuro: no focal findings Psych: oriented to time, place and person Skin: Pallor; ;  
 
Data:  
Labs: 
Recent Labs 10/09/18 
 0430  10/08/18 
 1934  10/08/18 
 1450 WBC  15.3*   --   9.7 HGB  10.6*  11.1*  10.7* HCT  31.0*  32.6*  31.2*  
 PLT  167   --   130* Recent Labs 10/09/18 
 0430  10/08/18 
 1934  10/08/18 
 1450 NA  140  142  143  
K  4.4  4.6  4.1 CL  110*  111*  112* CO2  23  25  26 GLU  97  127*  129* BUN  15  13  11 CREA  0.98  0.99  1.01  
CA  8.0*  8.6  7.6*  
MG  2.2  2.0  2.1 ALB  3.5  4.1  3.7 TBILI  0.6  0.9  0.7 SGOT  53*  47*  34 ALT  30  37  30 INR   --    --   1.2* Recent Labs 10/08/18 
 1719  10/08/18 
 1652  10/08/18 [address] PH  7.36  7.35  7.34* PCO2  40  44  47* PO2  60*  75*  137* HCO3  22  24  25 FIO2  30  60  100 Imaging: 
I have personally reviewed the patients radiographs: 
Postop, mild edema, low lung volumes Manual MD Elida

## 2018-10-09 NOTE — PROGRESS NOTES
Occupational Therapy Goals Initiated 10/9/2018 1. Patient will perform ADLs standing 5 mins without fatigue or LOB with supervision/set-up within 7 day(s). 2.  Patient will perform lower body ADLs with supervision/set-up within 7 day(s). 3.  Patient will perform gathering ADL items high and low 2/2 with supervision/set-up within 7 day(s). 4.  Patient will perform toilet transfers with supervision/set-up within 7 day(s). 5.  Patient will perform all aspects of toileting with supervision/set-up within 7 day(s). 6.  Patient will participate in cardiac/sternal upper extremity therapeutic exercise/activities to increase independence with ADLs with supervision/set-up for 5 minutes within 7 day(s). Occupational Therapy EVALUATION Patient: Karina Dalton (63 y.o. male) Date: 10/9/2018 Primary Diagnosis: CAD 
CAD (coronary artery disease) CAD (coronary artery disease), native coronary artery Procedure(s) (LRB): 
CORONARY ARTERY BYPASS GRAFT TIMES THREE  WITH RIGHT SAPHENOUS VEIN GRAFT ( ENDOSCOPIC HARVEST ) AND LEFT INTERNAL MAMMARY ARTERY WITH EXTRA CORPOREAL CIRCULATION. TRACI AND EPIAORTIC ULTRAOUND DONE BY DR SANDOVAL [facility]. (N/A) 1 Day Post-Op Precautions:   Fall, Sternal 
 
ASSESSMENT : 
Based on the objective data described below, the patient presents with generalized weakness, decreased endurance, strength, functional mobility and ADLs. 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 Pt was cleared to be seen for therapy and was sitting up in [facility] when OT ararived and all VSS. Pt was educated on sternal precautions and that he was not able to raise one arm over his head and has to take both arms over his head. Pt was min  assist of 2 to stand and min assist of 2 to turn and sit down on the bed.   He was able to perform BUE cardiac ex sitting on EOB and he was able to relax a little and was mod to max assist for sit to supine and stated that his pain increased during this task. Pt was positioned on his right side and nursing notified and recommend that pt have cardiac out pt at discharge. Patient will benefit from skilled intervention to address the above impairments. Patients rehabilitation potential is considered to be Good Factors which may influence rehabilitation potential include:  
[x]             None noted []             Mental ability/status []             Medical condition []             Home/family situation and support systems []             Safety awareness []             Pain tolerance/management 
[]             Other: PLAN : 
Recommendations and Planned Interventions: 
[x]               Self Care Training                  []        Therapeutic Activities [x]               Functional Mobility Training    []        Cognitive Retraining 
[x]               Therapeutic Exercises           [x]        Endurance Activities []               Balance Training                   []        Neuromuscular Re-Education []               Visual/Perceptual Training     [x]   Home Safety Training 
[x]               Patient Education                 []        Family Training/Education []               Other (comment): Frequency/Duration: Patient will be followed by occupational therapy 5 times a week to address goals. Discharge Recommendations: Outpatient Cardiac Rehab Further Equipment Recommendations for Discharge: tbd SUBJECTIVE:  
Patient stated I dont work 40 hours, more like 79 or [de-identified].  The patient stated 3/3 sternal precautions. Reviewed all 3 with patient. OBJECTIVE DATA SUMMARY:  
HISTORY:  
Past Medical History:  
Diagnosis Date  Diabetes ([facility] [address]  GERD (gastroesophageal reflux disease)  Gout  Heart attack ([facility] [address] 1996  
 stents placed  Hypercholesteremia  Hypertension  Ill-defined condition Gout  Sleep apnea Has not used in 3-4 months Past Surgical History:  
Procedure Laterality Date [address]  
 stent x 2  
 HX APPENDECTOMY  HX HEART CATHETERIZATION    
 NEUROLOGICAL PROCEDURE UNLISTED Right 2017  
 carpal tunnel Prior Level of Function/Environment/Context: pt lives with family and was working 2 jobs and independent with ADLs and ILS prior Expanded or extensive additional review of patient history:  
 
Home Situation Home Environment: Private residence One/Two Story Residence: Other (Comment) (three story; bedroom on second floor) # of Interior Steps: 15 Lift Chair Available: No 
Living Alone: No 
Support Systems: Spouse/Significant Other/Partner, Family member(s) Patient Expects to be Discharged to[de-identified] Private residence Current DME Used/Available at Home: CPAP, Glucometer Hand dominance: Right EXAMINATION OF PERFORMANCE DEFICITS: 
Cognitive/Behavioral Status: 
Neurologic State: Appropriate for age Orientation Level: Oriented X4 Cognition: Follows commands Perception: Appears intact Perseveration: No perseveration noted Safety/Judgement: Awareness of environment; Fall prevention Skin: in good health Edema: swelling in B hands Hearing: 
  
 
Vision/Perceptual:   
    
    
    
 intact Range of Motion: 
 
AROM: Generally decreased, functional 
PROM: Generally decreased, functional 
  
  
  
  
  
  
 
Strength: 
 
Strength: Generally decreased, functional 
  
  
  
  
 
Coordination: 
Coordination: Within functional limits Fine Motor Skills-Upper: Left Intact; Right Intact Gross Motor Skills-Upper: Left Intact; Right Intact Tone & Sensation: 
 
Tone: Normal 
  
  
  
  
  
  
  
 
Balance: 
Sitting: Intact Standing: Impaired; With support Standing - Static: Good Standing - Dynamic : Fair Functional Mobility and Transfers for ADLs: 
Bed Mobility: 
Rolling: Minimum assistance;Assist x2 
 Sit to Supine: Moderate assistance Scooting: Minimum assistance Transfers: 
Sit to Stand: Minimum assistance;Assist x2 Stand to Sit: Minimum assistance Bed to Chair: Minimum assistance;Assist x2 ADL Assessment: 
Feeding: Setup Oral Facial Hygiene/Grooming: Setup Bathing: Maximum assistance Upper Body Dressing: Maximum assistance; Moderate assistance Lower Body Dressing: Maximum assistance Toileting: Maximum assistance Cognitive Retraining Safety/Judgement: Awareness of environment; Fall prevention Patient instructed no asymmetrical reaching over head to ensure B UEs when shoulders >90* i.e. reaching in cabinets and dressing. Instruction on upper body dressing techniques of over head, then arms through to decrease pain and unilateral shoulder flexion >90*. Instruction on the benefits of utilizing B UEs during functional tasks i.e. opening the fridge, stepping into the tub. Instruction if continued pain at home with shoulder IR for BM hygiene can use wet wipes and toilet tongs PRN. Avoid valsalva maneuvers. Therapeutic Exercises:  
Patient instructed on the benefits and demonstrated cardiac exercises while sitting with Contact guard assistance and Minimum assistance. Instructed and indicated understanding on how to progress reps, sets against gravity, working up to 5 lbs, standing and so on based on surgeon clearance for more weight in prep for basic and instrumental ADLs. Instruction on the use of household items in place of weights as needed. CARDIAC EXERCISE Sets Reps Active  Active Assist   
Passive  Self ROM Comments Shoulder flexion  1  5   [x]                            []                             []                             []                               
Shoulder abduction  1  5  [x]                             []                             []                             []                               
 Scapular elevation  1  5  [x]                             []                              []                             []                               
Scapular retraction  1  5  [x]                             []                             []                             []                               
Trunk rotation  1  5  []                             []                             []                             []                               
Trunk sidebending  1  5  []                             []                              []                             []                                     
 
Functional Measure: 
Barthel Index: 
 
Bathin Bladder: 0 Bowels: 10 
Groomin Dressin Feeding: 10 Mobility: 5 Stairs: 0 Toilet Use: 5 Transfer (Bed to Chair and Back): 5 Total: 40 Barthel and G-code impairment scale: 
Percentage of impairment CH 
0% CI 
1-19% CJ 
20-39% CK 
40-59% CL 
60-79% CM 
80-99% CN 
100% Barthel Score 0-100 100 99-80 79-60 59-40 20-39 1-19 
 0 Barthel Score 0-20 20 17-19 13-16 9-12 5-8 1-4 0 The Barthel ADL Index: Guidelines 1. The index should be used as a record of what a patient does, not as a record of what a patient could do. 2. The main aim is to establish degree of independence from any help, physical or verbal, however minor and for whatever reason. 3. The need for supervision renders the patient not independent. 4. A patient's performance should be established using the best available evidence. Asking the patient, friends/relatives and nurses are the usual sources, but direct observation and common sense are also important. However direct testing is not needed. 5. Usually the patient's performance over the preceding 24-48 hours is important, but occasionally longer periods will be relevant. 6. Middle categories imply that the patient supplies over 50 per cent of the effort. 7. Use of aids to be independent is allowed. Kota Brandon., Barthel, D.W. ([id number]). Functional evaluation: the Barthel Index. [facility] (14)2. JOHN Huitron, Saeed Grider.Rosalie., [place], [address] (1999). Measuring the change indisability after inpatient rehabilitation; comparison of the responsiveness of the Barthel Index and Functional [place] Measure. Journal of Neurology, Neurosurgery, and Psychiatry, 66(4), 012-070. YOANDY Steele, ISAAK Hoffmann, & Edson Matthews M.A. (2004.) Assessment of post-stroke quality of life in cost-effectiveness studies: The usefulness of the Barthel Index and the EuroQoL-5D. [facility], 13, 965-11 G codes: In compliance with CMSs Claims Based Outcome Reporting, the following G-code set was chosen for this patient based on their primary functional limitation being treated: The outcome measure chosen to determine the severity of the functional limitation was the Barthel with a score of 40/100 which was correlated with the impairment scale. ? Self Care:  
  - CURRENT STATUS: CK - 40%-59% impaired, limited or restricted  - GOAL STATUS: CJ - 20%-39% impaired, limited or restricted  - D/C STATUS:  ---------------To be determined--------------- Occupational Therapy Evaluation Charge Determination History Examination Decision-Making MEDIUM Complexity : Expanded review of history including physical, cognitive and psychosocial  history  LOW Complexity : 1-3 performance deficits relating to physical, cognitive , or psychosocial skils that result in activity limitations and / or participation restrictions  LOW Complexity : No comorbidities that affect functional and no verbal or physical assistance needed to complete eval tasks Based on the above components, the patient evaluation is determined to be of the following complexity level: LOW Pain: 
Pain Scale 1: Numeric (0 - 10) Pain Intensity 1: 3 Pain Location 1: Chest 
 Pain Orientation 1: Anterior;Mid 
Pain Description 1: Aching; Sore Pain Intervention(s) 1: Medication (see MAR) Activity Tolerance:  
fair Please refer to the flowsheet for vital signs taken during this treatment. After treatment:  
[] Patient left in no apparent distress sitting up in chair 
[x] Patient left in no apparent distress in bed 
[x] Call bell left within reach [x] Nursing notified 
[x] Caregiver present 
[] Bed alarm activated COMMUNICATION/EDUCATION:  
The patients plan of care was discussed with: Physical Therapist and Registered Nurse. [x] Home safety education was provided and the patient/caregiver indicated understanding. [x] Patient/family have participated as able in goal setting and plan of care. [x] Patient/family agree to work toward stated goals and plan of care. [] Patient understands intent and goals of therapy, but is neutral about his/her participation. [] Patient is unable to participate in goal setting and plan of care. This patients plan of care is appropriate for delegation to [facility]. Thank you for this referral. 
Vasu Andre OT Time Calculation: 38 mins

## 2018-10-09 NOTE — CARDIO/PULMONARY
Cardiopulmonary Rehab:   
 
Chart reviewed. Pt is a 63 y.o. male admitted S/P CABG 10/8/18. POD #1. PMH includes CAD, gout, DM, MI, cardiac stents, and gout. Pt visited, in CCU. Daughter at the bedside. CABG education folder at bedside. Met with Josiah Cruz Sr to review cardiac surgery post discharge instructions and to discuss participation in the Cardiac Rehab Program.  
 
Reviewed use of the incentive spirometer, cough and deep breathing while splinting the incision and leg exercises. Pt unable to return demonstratie technique due to pain. RN aware and medicating. Encouragement provided to use the incentive spirometer regularly and to increase effort as he can tolerate it. Also reviewed post operative teaching re: temporarily restricted activities, monitoring for infection, daily weights, incisional care, the cardiac diet, balancing rest and activity and benefits of Outpatient Cardiac Rehab. Pt is scheduled to begin cardiopulmonary rehabilitation Monday, Novmember 5, 2018 @ 1030. Will continue to follow. Pt grimacing, and uncomfortable during teaching session, teaching mostly done with daughter.

## 2018-10-09 NOTE — INTERDISCIPLINARY ROUNDS
Critical care interdisciplinary rounds held on 10/09/2018. Following members present, Pharmacy, Diabetes Treatment, Case Management, Respiratory Therapy, Physical Therapy and Nutrition. Led by Bubba Zelaya. Jeannie Olivia RN and Dr. Melodie Smith and Dr. Blayne Potts. Plan of care discussed. See clinical pathway for plan of care and interventions and desired outcomes.

## 2018-10-09 NOTE — PROGRESS NOTES
Problem: Mobility Impaired (Adult and Pediatric) Goal: *Acute Goals and Plan of Care (Insert Text) Physical Therapy Goals Initiated 10/9/2018 1. Patient will move from supine to sit and sit to supine , scoot up and down and roll side to side in bed with modified independence within 5 days. 2.  Patient will perform sit to/from stand with modified independence within 5 days. 3.  Patient will ambulate 400 feet with least restrictive assistive device and independence within 5 days. 4.  Patient will ascend/descend 14 stairs with 1 handrail(s) with modified independence within 5 days. 5.  Patient will perform cardiac exercises per protocol with independence within 5 days. 6.  Patient will verbally and functionally recall 3/3 sternal precautions within 5 days. physical Therapy EVALUATION Patient: Michelle Simental (63 y.o. male) Date: 10/9/2018 Primary Diagnosis: CAD 
CAD (coronary artery disease) CAD (coronary artery disease), native coronary artery Procedure(s) (LRB): 
CORONARY ARTERY BYPASS GRAFT TIMES THREE  WITH RIGHT SAPHENOUS VEIN GRAFT ( ENDOSCOPIC HARVEST ) AND LEFT INTERNAL MAMMARY ARTERY WITH EXTRA CORPOREAL CIRCULATION. TRACI AND EPIAORTIC ULTRAOUND DONE BY DR Hernandez [facility]. (N/A) 1 Day Post-Op Precautions:  Fall, Sternal 
 
ASSESSMENT : 
Based on the objective data described below, the patient presents with impaired functional mobility secondary to impaired standing balance, impaired gait mechanics, generalized weakness, decreased ROM, increased pain, sternal precautions, and decreased activity tolerance POD# 1 CABG x 3. Pt received sitting in bedside chair and agreeable to therapy evaluation. Pt cleared by nursing for mobility. Armando Flirt catheter in place, therefore limited bed<>chair. Educated pt on sternal precautions and pt with good understanding. He performed sit<>stand transfers with min A x 2.  He ambulated short distance from chair>bed with min/CGA x 2 and additional assistance for management of lines, CT, mera, and external pacer. Sitting balance intact, however pt needed mod A x 2 for sit>supine transfer for trunk and BLEs. VSS throughout activity, however SaO2 88-89% on 6 L/min O2 NC with sit>supine transfer. Needed frequent cueing to relax and for deep breathing. Pt was left supine in bed with all needs met, RN and family present, and VSS on 6 L/min O2 NC following therapy session. Anticipate pt will progress well in acute PT and return home with family support and HHPT. PT will continue to follow to address mobility impairments as noted above. Patient will benefit from skilled intervention to address the above impairments. Patients rehabilitation potential is considered to be Good Factors which may influence rehabilitation potential include:  
[]         None noted 
[]         Mental ability/status [x]         Medical condition 
[]         Home/family situation and support systems 
[]         Safety awareness [x]         Pain tolerance/management 
[]         Other: PLAN : 
Recommendations and Planned Interventions: 
[x]           Bed Mobility Training             []    Neuromuscular Re-Education 
[x]           Transfer Training                   []    Orthotic/Prosthetic Training 
[x]           Gait Training                         []    Modalities [x]           Therapeutic Exercises           []    Edema Management/Control 
[x]           Therapeutic Activities            [x]    Patient and Family Training/Education 
[]           Other (comment): Frequency/Duration: Patient will be followed by physical therapy daily to address goals. Discharge Recommendations: Home Health Further Equipment Recommendations for Discharge: TBD - likely none SUBJECTIVE:  
Patient stated This is much more comfortable.  OBJECTIVE DATA SUMMARY:  
HISTORY:   
Past Medical History:  
Diagnosis Date  Diabetes ([facility] [address]  GERD (gastroesophageal reflux disease)  Gout  Heart attack ([facility] [address] 1996  
 stents placed  Hypercholesteremia  Hypertension  Ill-defined condition Gout  Sleep apnea Has not used in 3-4 months Past Surgical History:  
Procedure Laterality Date [facility]  
 stent x 2  
 HX APPENDECTOMY  HX HEART CATHETERIZATION    
 NEUROLOGICAL PROCEDURE UNLISTED Right 2017  
 carpal tunnel Prior Level of Function/Home Situation: Pt is independent for mobility and ADLs at baseline. Lives with SO. Drives. Works 40+ hours/week. Personal factors and/or comorbidities impacting plan of care: gout; diabetes Home Situation Home Environment: Private residence One/Two Story Residence: Other (Comment) (three story; bedroom on second floor) # of Interior Steps: 15 Lift Chair Available: No 
Living Alone: No 
Support Systems: Spouse/Significant Other/Partner, Family member(s) Patient Expects to be Discharged to[de-identified] Private residence Current DME Used/Available at Home: CPAP, Glucometer EXAMINATION/PRESENTATION/DECISION MAKING:  
Critical Behavior: 
Neurologic State: Appropriate for age Orientation Level: Oriented X4 Cognition: Follows commands Hearing: 
  
Skin:  Sternal incision Edema: None Range Of Motion: 
AROM: Generally decreased, functional 
  
  
  
PROM: Generally decreased, functional 
  
  
  
Strength:   
Strength: Generally decreased, functional 
  
  
  
  
  
  
Tone & Sensation:  
Tone: Normal 
  
  
  
  
  
  
  
  
   
Coordination: 
Coordination: Within functional limits Vision:  
  
Functional Mobility: 
Bed Mobility: 
Rolling: Minimum assistance;Assist x2 Sit to Supine: Moderate assistance Scooting: Minimum assistance Transfers: 
Sit to Stand: Minimum assistance;Assist x2 Stand to Sit: Minimum assistance Bed to Chair: Minimum assistance;Assist x2 Balance:  
Sitting: Intact Standing: Impaired; With support Standing - Static: Good Standing - Dynamic : Fair Ambulation/Gait Training: 
Distance (ft): 5 Feet (ft) Assistive Device: Gait belt Ambulation - Level of Assistance: Minimal assistance;Assist x2 Gait Description (WDL): Exceptions to [facility] Gait Abnormalities: Decreased step clearance;Trunk sway increased; Altered arm swing Base of Support: Widened Speed/Guerita: Pace decreased (<100 feet/min); Shuffled Step Length: Left shortened;Right shortened Therapeutic Exercises:  
Patient instructed on the benefits and demonstrated cardiac exercises while sitting with instruction. Instructed and indicated understanding on how to progress reps, sets against gravity, working up to 5 lbs, standing and so on based on surgeon clearance for more weight in prep for basic and instrumental ADL. Can use household items for weights. CARDIAC EXERCISE Sets Reps Active  Active Assist   
Passive  Self ROM Comments Shoulder flexion  1  5   [x]                            []                             []                             []                               
Shoulder abduction    []                             []                             []                             []                               
Scapular elevation  1  5  [x]                             []                              []                             []                               
Scapular retraction  1  5  [x]                             []                             []                             []                               
Trunk rotation    []                             []                             []                             []                               
Trunk sidebending    []                             []                              []                             []                                     
 
Functional Measure: 
Barthel Index: 
 
Bathin Bladder: 0 Bowels: 10 
Groomin Dressin Feeding: 10 Mobility: 5 Stairs: 0 Toilet Use: 5 Transfer (Bed to Chair and Back): 5 Total: 40 Barthel and G-code impairment scale: 
Percentage of impairment CH 
0% CI 
1-19% CJ 
20-39% CK 
40-59% CL 
60-79% CM 
80-99% CN 
100% Barthel Score 0-100 100 99-80 79-60 59-40 20-39 1-19 
 0 Barthel Score 0-20 20 17-19 13-16 9-12 5-8 1-4 0 The Barthel ADL Index: Guidelines 1. The index should be used as a record of what a patient does, not as a record of what a patient could do. 2. The main aim is to establish degree of independence from any help, physical or verbal, however minor and for whatever reason. 3. The need for supervision renders the patient not independent. 4. A patient's performance should be established using the best available evidence. Asking the patient, friends/relatives and nurses are the usual sources, but direct observation and common sense are also important. However direct testing is not needed. 5. Usually the patient's performance over the preceding 24-48 hours is important, but occasionally longer periods will be relevant. 6. Middle categories imply that the patient supplies over 50 per cent of the effort. 7. Use of aids to be independent is allowed. Maureen Gray., Barthel, D.W. (7257). Functional evaluation: the Barthel Index. [address] [facility] (14)2. Cindi Ramirez, JEANJCARINA, Patti Glasgow., Sera [facility].[address] (). Measuring the change indisability after inpatient rehabilitation; comparison of the responsiveness of the Barthel Index and Functional [place] Measure. Journal of Neurology, Neurosurgery, and Psychiatry, 66(4), 958-147. Adria Dailey, [email or web address].A, ISAAK Hoffmann, & Jad Isabel, M.A. (2004.) Assessment of post-stroke quality of life in cost-effectiveness studies: The usefulness of the Barthel Index and the EuroQoL-5D. [facility], 13, [phone] G codes: In compliance with CMSs Claims Based Outcome Reporting, the following G-code set was chosen for this patient based on their primary functional limitation being treated: The outcome measure chosen to determine the severity of the functional limitation was the Barthel Index with a score of 40/100 which was correlated with the impairment scale. ? Mobility - Walking and Moving Around:  
  - CURRENT STATUS: CK - 40%-59% impaired, limited or restricted  - GOAL STATUS: CI - 1%-19% impaired, limited or restricted  - D/C STATUS:  ---------------To be determined--------------- Physical Therapy Evaluation Charge Determination History Examination Presentation Decision-Making MEDIUM  Complexity : 1-2 comorbidities / personal factors will impact the outcome/ POC  HIGH Complexity : 4+ Standardized tests and measures addressing body structure, function, activity limitation and / or participation in recreation  MEDIUM Complexity : Evolving with changing characteristics  Other outcome measures Barthel Index  MEDIUM Based on the above components, the patient evaluation is determined to be of the following complexity level: MEDIUM Pain: 
Pain Scale 1: Numeric (0 - 10) Pain Intensity 1: 3 Pain Location 1: Chest 
Pain Orientation 1: Anterior;Mid 
Pain Description 1: Aching; Sore Pain Intervention(s) 1: Medication (see MAR) Activity Tolerance:  
Fair - increased pain with activity; SaO2 88-89% with bed mobility on 6 L/min O2; BP stable with positional changes Please refer to the flowsheet for vital signs taken during this treatment. After treatment:  
[]         Patient left in no apparent distress sitting up in chair 
[x]         Patient left in no apparent distress in bed 
[x]         Call bell left within reach [x]         Nursing notified 
[x]         Caregiver present 
[]         Bed alarm activated COMMUNICATION/EDUCATION:  
 The patients plan of care was discussed with: Physical Therapist, Occupational Therapist and Registered Nurse. [x]         Fall prevention education was provided and the patient/caregiver indicated understanding. [x]         Patient/family have participated as able in goal setting and plan of care. [x]         Patient/family agree to work toward stated goals and plan of care. []         Patient understands intent and goals of therapy, but is neutral about his/her participation. []         Patient is unable to participate in goal setting and plan of care. Thank you for this referral. 
Rocio Bowman, PT, DPT Time Calculation: 20 mins

## 2018-10-09 NOTE — PROGRESS NOTES
Bedside and Verbal shift change report given to Valentina Mckee RN (oncoming nurse) by Michael Clifford RN (offgoing nurse). Report included the following information SBAR, Kardex, Intake/Output, Med Rec Status and Quality Measures. 0700- Pt assisted up to chair with the help of nursing, required a venti mask 50% to improve ventilation. Will wean 02 accordingly. Pt's chest tube having 30-50ml bloody fluid Q 1 hour. Parish catheter draining clear yellow urine. 0830- Pt on 6L NC, satting 94%. Up in recliner. 1000- 200ml on IS. CLD ordered per protocol. Dalia Sleet, [address] gave verbal order to pull [place]. 1100- 2mg morphine given for physical therapy. Ambulated from chair to bed with help of PT and nursing. sats remained above 92% on 6L NC. /66. Pt back in bed- will pull swan now and try and walk later. [address] pulled with Dr. Sylvia Dumont at the bedside. 1300- Pt has a productive cough with intermittent bloody sputum. 1400- Pt up with PT for the second time today, walked with PT and tolerated well (see note) 1800- Pt has had poor urine output, jorge urine. Dr. Tai Acuna notified, will give lasix. 1530- 30ml out 1630- 25ml out 1730- 15ml out 1846- Pt back in bed, 10mg Roxicodone given for pain with ambulation, Pt requiring venti mask 50%, satting 90%, awake and alert. 1930- 120ml UO this hour. Pt only pulling 500ml on  IS, pt encouraged by RN. 1945- Bedside and Verbal shift change report given to Saturnino Merlos RN (oncoming nurse) by Valentina Mckee RN and ANUSHA Campbell (offgoing nurse). Report included the following information SBAR, Kardex, OR Summary, Intake/Output, MAR and Quality Measures.

## 2018-10-09 NOTE — PROGRESS NOTES
0730  Bedside and verbal report from Charity Hendrix RN. 
0800  Assessment completed. Family members at bedside; patient complains of ongoing pain issues. Assisted out of bed to recliner with 2 assists. 7352  Medicated with toradol 30 mg IV per v.o. Carmen Kumar. 
8945  Scheduled acetaminophen given. 0900  Still complains of pain \"8/10\" scale. 0913  Morphine 2 mg IV given per prn order.

## 2018-10-09 NOTE — PROGRESS NOTES
1934: 4 hour labs obtained. 1955: Medicated with 2 mg morphine for incisional pain. 2130: OOB into recliner. Tolerated well. 2228: 10 mg Roxicodone given for pain. 2330: Assisted back to bed. C/O pain with activity. 2346: 2 mg morphine given for pain. 0100: Resting well at this time. 0229: 10 mg Roxicodone given for pain. 0400: Precedex drip weaned off. 
7015: One PRN albumin given to attempt to wean off mar drip. 
0633: O2 saturations down into 80's. Patient with pain and unable to take deep breaths. 2 mg morphine given. 0131: Continues to C/O pain. 10 mg Roxicodone given. 4147: Added 50% venti mask to improve ventilation. 250 on IS. Weak cough effort. Will assist OOB once O2 saturations > 90%. 0700-800: Report given to oncoming shift. Assisted patient OOB to chair.

## 2018-10-10 ENCOUNTER — APPOINTMENT (OUTPATIENT)
Dept: GENERAL RADIOLOGY | Age: 58
DRG: 236 | End: 2018-10-10
Attending: PHYSICIAN ASSISTANT
Payer: COMMERCIAL

## 2018-10-10 LAB
ADMINISTERED INITIALS, ADMINIT: NORMAL
ALBUMIN SERPL-MCNC: 3.6 G/DL (ref 3.5–5)
ALBUMIN/GLOB SERPL: 1.6 {RATIO} (ref 1.1–2.2)
ALP SERPL-CCNC: 52 U/L (ref 45–117)
ALT SERPL-CCNC: 23 U/L (ref 12–78)
ANION GAP SERPL CALC-SCNC: 7 MMOL/L (ref 5–15)
AST SERPL-CCNC: 66 U/L (ref 15–37)
ATRIAL RATE: 66 BPM
ATRIAL RATE: 67 BPM
ATRIAL RATE: 70 BPM
BILIRUB SERPL-MCNC: 1 MG/DL (ref 0.2–1)
BUN SERPL-MCNC: 19 MG/DL (ref 6–20)
BUN/CREAT SERPL: 19 (ref 12–20)
CALCIUM SERPL-MCNC: 8 MG/DL (ref 8.5–10.1)
CALCULATED P AXIS, ECG09: 16 DEGREES
CALCULATED P AXIS, ECG09: 167 DEGREES
CALCULATED P AXIS, ECG09: 23 DEGREES
CALCULATED R AXIS, ECG10: 12 DEGREES
CALCULATED R AXIS, ECG10: 14 DEGREES
CALCULATED R AXIS, ECG10: 179 DEGREES
CALCULATED T AXIS, ECG11: 178 DEGREES
CALCULATED T AXIS, ECG11: 36 DEGREES
CALCULATED T AXIS, ECG11: 9 DEGREES
CHLORIDE SERPL-SCNC: 107 MMOL/L (ref 97–108)
CO2 SERPL-SCNC: 25 MMOL/L (ref 21–32)
CREAT SERPL-MCNC: 0.98 MG/DL (ref 0.7–1.3)
D50 ADMINISTERED, D50ADM: 0 ML
D50 ORDER, D50ORD: 0 ML
DIAGNOSIS, 93000: NORMAL
ERYTHROCYTE [DISTWIDTH] IN BLOOD BY AUTOMATED COUNT: 13.5 % (ref 11.5–14.5)
GLOBULIN SER CALC-MCNC: 2.2 G/DL (ref 2–4)
GLSCOM COMMENTS: NORMAL
GLUCOSE BLD STRIP.AUTO-MCNC: 100 MG/DL (ref 65–100)
GLUCOSE BLD STRIP.AUTO-MCNC: 103 MG/DL (ref 65–100)
GLUCOSE BLD STRIP.AUTO-MCNC: 105 MG/DL (ref 65–100)
GLUCOSE BLD STRIP.AUTO-MCNC: 105 MG/DL (ref 65–100)
GLUCOSE BLD STRIP.AUTO-MCNC: 107 MG/DL (ref 65–100)
GLUCOSE BLD STRIP.AUTO-MCNC: 113 MG/DL (ref 65–100)
GLUCOSE BLD STRIP.AUTO-MCNC: 113 MG/DL (ref 65–100)
GLUCOSE BLD STRIP.AUTO-MCNC: 114 MG/DL (ref 65–100)
GLUCOSE BLD STRIP.AUTO-MCNC: 116 MG/DL (ref 65–100)
GLUCOSE BLD STRIP.AUTO-MCNC: 140 MG/DL (ref 65–100)
GLUCOSE BLD STRIP.AUTO-MCNC: 140 MG/DL (ref 65–100)
GLUCOSE BLD STRIP.AUTO-MCNC: 156 MG/DL (ref 65–100)
GLUCOSE BLD STRIP.AUTO-MCNC: 178 MG/DL (ref 65–100)
GLUCOSE SERPL-MCNC: 105 MG/DL (ref 65–100)
GLUCOSE, GLC: 100 MG/DL
GLUCOSE, GLC: 103 MG/DL
GLUCOSE, GLC: 105 MG/DL
GLUCOSE, GLC: 105 MG/DL
GLUCOSE, GLC: 107 MG/DL
GLUCOSE, GLC: 113 MG/DL
GLUCOSE, GLC: 113 MG/DL
GLUCOSE, GLC: 114 MG/DL
GLUCOSE, GLC: 116 MG/DL
GLUCOSE, GLC: 140 MG/DL
GLUCOSE, GLC: 140 MG/DL
HCT VFR BLD AUTO: 27.4 % (ref 36.6–50.3)
HGB BLD-MCNC: 9 G/DL (ref 12.1–17)
HIGH TARGET, HITG: 130 MG/DL
INSULIN ADMINSTERED, INSADM: 2.2 UNITS/HOUR
INSULIN ADMINSTERED, INSADM: 2.3 UNITS/HOUR
INSULIN ADMINSTERED, INSADM: 2.4 UNITS/HOUR
INSULIN ADMINSTERED, INSADM: 2.4 UNITS/HOUR
INSULIN ADMINSTERED, INSADM: 2.5 UNITS/HOUR
INSULIN ADMINSTERED, INSADM: 2.9 UNITS/HOUR
INSULIN ADMINSTERED, INSADM: 3 UNITS/HOUR
INSULIN ADMINSTERED, INSADM: 4.3 UNITS/HOUR
INSULIN ADMINSTERED, INSADM: 5.1 UNITS/HOUR
INSULIN ORDER, INSORD: 2.2 UNITS/HOUR
INSULIN ORDER, INSORD: 2.3 UNITS/HOUR
INSULIN ORDER, INSORD: 2.4 UNITS/HOUR
INSULIN ORDER, INSORD: 2.4 UNITS/HOUR
INSULIN ORDER, INSORD: 2.5 UNITS/HOUR
INSULIN ORDER, INSORD: 2.9 UNITS/HOUR
INSULIN ORDER, INSORD: 3 UNITS/HOUR
INSULIN ORDER, INSORD: 4.3 UNITS/HOUR
INSULIN ORDER, INSORD: 5.1 UNITS/HOUR
LOW TARGET, LOT: 95 MG/DL
MAGNESIUM SERPL-MCNC: 2.3 MG/DL (ref 1.6–2.4)
MCH RBC QN AUTO: 29 PG (ref 26–34)
MCHC RBC AUTO-ENTMCNC: 32.8 G/DL (ref 30–36.5)
MCV RBC AUTO: 88.4 FL (ref 80–99)
MINUTES UNTIL NEXT BG, NBG: 120 MIN
MINUTES UNTIL NEXT BG, NBG: 120 MIN
MINUTES UNTIL NEXT BG, NBG: 60 MIN
MULTIPLIER, MUL: 0.05
MULTIPLIER, MUL: 0.06
NRBC # BLD: 0 K/UL (ref 0–0.01)
NRBC BLD-RTO: 0 PER 100 WBC
ORDER INITIALS, ORDINIT: NORMAL
P-R INTERVAL, ECG05: 176 MS
P-R INTERVAL, ECG05: 178 MS
P-R INTERVAL, ECG05: 208 MS
PLATELET # BLD AUTO: 117 K/UL (ref 150–400)
PMV BLD AUTO: 10.1 FL (ref 8.9–12.9)
POTASSIUM SERPL-SCNC: 3.8 MMOL/L (ref 3.5–5.1)
PROT SERPL-MCNC: 5.8 G/DL (ref 6.4–8.2)
Q-T INTERVAL, ECG07: 386 MS
Q-T INTERVAL, ECG07: 396 MS
Q-T INTERVAL, ECG07: 418 MS
QRS DURATION, ECG06: 100 MS
QTC CALCULATION (BEZET), ECG08: 407 MS
QTC CALCULATION (BEZET), ECG08: 415 MS
QTC CALCULATION (BEZET), ECG08: 451 MS
RBC # BLD AUTO: 3.1 M/UL (ref 4.1–5.7)
SERVICE CMNT-IMP: ABNORMAL
SERVICE CMNT-IMP: NORMAL
SODIUM SERPL-SCNC: 139 MMOL/L (ref 136–145)
VENTRICULAR RATE, ECG03: 66 BPM
VENTRICULAR RATE, ECG03: 67 BPM
VENTRICULAR RATE, ECG03: 70 BPM
WBC # BLD AUTO: 11.1 K/UL (ref 4.1–11.1)

## 2018-10-10 PROCEDURE — 36415 COLL VENOUS BLD VENIPUNCTURE: CPT | Performed by: PHYSICIAN ASSISTANT

## 2018-10-10 PROCEDURE — 74011000258 HC RX REV CODE- 258: Performed by: PHYSICIAN ASSISTANT

## 2018-10-10 PROCEDURE — 65660000000 HC RM CCU STEPDOWN

## 2018-10-10 PROCEDURE — 82962 GLUCOSE BLOOD TEST: CPT

## 2018-10-10 PROCEDURE — 74011250637 HC RX REV CODE- 250/637: Performed by: PHYSICIAN ASSISTANT

## 2018-10-10 PROCEDURE — 74011250636 HC RX REV CODE- 250/636: Performed by: PHYSICIAN ASSISTANT

## 2018-10-10 PROCEDURE — 71045 X-RAY EXAM CHEST 1 VIEW: CPT

## 2018-10-10 PROCEDURE — 80053 COMPREHEN METABOLIC PANEL: CPT | Performed by: PHYSICIAN ASSISTANT

## 2018-10-10 PROCEDURE — 74011000258 HC RX REV CODE- 258: Performed by: THORACIC SURGERY (CARDIOTHORACIC VASCULAR SURGERY)

## 2018-10-10 PROCEDURE — 85027 COMPLETE CBC AUTOMATED: CPT | Performed by: PHYSICIAN ASSISTANT

## 2018-10-10 PROCEDURE — 97116 GAIT TRAINING THERAPY: CPT

## 2018-10-10 PROCEDURE — 74011636637 HC RX REV CODE- 636/637: Performed by: PHYSICIAN ASSISTANT

## 2018-10-10 PROCEDURE — 74011636637 HC RX REV CODE- 636/637: Performed by: THORACIC SURGERY (CARDIOTHORACIC VASCULAR SURGERY)

## 2018-10-10 PROCEDURE — 97535 SELF CARE MNGMENT TRAINING: CPT

## 2018-10-10 PROCEDURE — 83735 ASSAY OF MAGNESIUM: CPT | Performed by: PHYSICIAN ASSISTANT

## 2018-10-10 PROCEDURE — 74011250636 HC RX REV CODE- 250/636: Performed by: THORACIC SURGERY (CARDIOTHORACIC VASCULAR SURGERY)

## 2018-10-10 PROCEDURE — 97530 THERAPEUTIC ACTIVITIES: CPT

## 2018-10-10 PROCEDURE — 74011000250 HC RX REV CODE- 250: Performed by: PHYSICIAN ASSISTANT

## 2018-10-10 PROCEDURE — 74011000250 HC RX REV CODE- 250: Performed by: THORACIC SURGERY (CARDIOTHORACIC VASCULAR SURGERY)

## 2018-10-10 PROCEDURE — 74011250637 HC RX REV CODE- 250/637: Performed by: THORACIC SURGERY (CARDIOTHORACIC VASCULAR SURGERY)

## 2018-10-10 RX ORDER — METFORMIN HYDROCHLORIDE 500 MG/1
1000 TABLET ORAL 2 TIMES DAILY WITH MEALS
Status: DISCONTINUED | OUTPATIENT
Start: 2018-10-10 | End: 2018-10-12 | Stop reason: HOSPADM

## 2018-10-10 RX ORDER — SODIUM CHLORIDE 0.9 % (FLUSH) 0.9 %
5-10 SYRINGE (ML) INJECTION AS NEEDED
Status: DISCONTINUED | OUTPATIENT
Start: 2018-10-10 | End: 2018-10-12 | Stop reason: HOSPADM

## 2018-10-10 RX ORDER — GUAIFENESIN 100 MG/5ML
81 LIQUID (ML) ORAL DAILY
Status: DISCONTINUED | OUTPATIENT
Start: 2018-10-11 | End: 2018-10-10 | Stop reason: SDUPTHER

## 2018-10-10 RX ORDER — SODIUM CHLORIDE 0.9 % (FLUSH) 0.9 %
5-10 SYRINGE (ML) INJECTION EVERY 8 HOURS
Status: DISCONTINUED | OUTPATIENT
Start: 2018-10-10 | End: 2018-10-12 | Stop reason: HOSPADM

## 2018-10-10 RX ORDER — MAG HYDROX/ALUMINUM HYD/SIMETH 200-200-20
30 SUSPENSION, ORAL (FINAL DOSE FORM) ORAL
Status: DISCONTINUED | OUTPATIENT
Start: 2018-10-10 | End: 2018-10-12 | Stop reason: HOSPADM

## 2018-10-10 RX ORDER — ZOLPIDEM TARTRATE 5 MG/1
5 TABLET ORAL
Status: DISCONTINUED | OUTPATIENT
Start: 2018-10-10 | End: 2018-10-12 | Stop reason: HOSPADM

## 2018-10-10 RX ORDER — METOPROLOL TARTRATE 5 MG/5ML
5 INJECTION INTRAVENOUS ONCE
Status: COMPLETED | OUTPATIENT
Start: 2018-10-10 | End: 2018-10-10

## 2018-10-10 RX ORDER — MAGNESIUM SULFATE HEPTAHYDRATE 40 MG/ML
2 INJECTION, SOLUTION INTRAVENOUS ONCE
Status: COMPLETED | OUTPATIENT
Start: 2018-10-10 | End: 2018-10-11

## 2018-10-10 RX ORDER — POTASSIUM CHLORIDE 29.8 MG/ML
20 INJECTION INTRAVENOUS ONCE
Status: COMPLETED | OUTPATIENT
Start: 2018-10-10 | End: 2018-10-11

## 2018-10-10 RX ORDER — ALLOPURINOL 100 MG/1
300 TABLET ORAL DAILY
Status: DISCONTINUED | OUTPATIENT
Start: 2018-10-11 | End: 2018-10-12 | Stop reason: HOSPADM

## 2018-10-10 RX ORDER — POTASSIUM CHLORIDE 750 MG/1
20 TABLET, FILM COATED, EXTENDED RELEASE ORAL DAILY
Status: DISCONTINUED | OUTPATIENT
Start: 2018-10-11 | End: 2018-10-12 | Stop reason: HOSPADM

## 2018-10-10 RX ORDER — ACETAMINOPHEN 325 MG/1
650 TABLET ORAL
Status: DISCONTINUED | OUTPATIENT
Start: 2018-10-10 | End: 2018-10-12 | Stop reason: HOSPADM

## 2018-10-10 RX ORDER — INSULIN GLARGINE 100 [IU]/ML
18 INJECTION, SOLUTION SUBCUTANEOUS ONCE
Status: COMPLETED | OUTPATIENT
Start: 2018-10-10 | End: 2018-10-10

## 2018-10-10 RX ORDER — BUMETANIDE 1 MG/1
1 TABLET ORAL DAILY
Status: DISCONTINUED | OUTPATIENT
Start: 2018-10-11 | End: 2018-10-12

## 2018-10-10 RX ORDER — METOPROLOL TARTRATE 25 MG/1
12.5 TABLET, FILM COATED ORAL EVERY 12 HOURS
Status: DISCONTINUED | OUTPATIENT
Start: 2018-10-10 | End: 2018-10-12

## 2018-10-10 RX ADMIN — ATORVASTATIN CALCIUM 20 MG: 20 TABLET, FILM COATED ORAL at 08:21

## 2018-10-10 RX ADMIN — SODIUM CHLORIDE 9 ML/HR: 900 INJECTION, SOLUTION INTRAVENOUS at 05:04

## 2018-10-10 RX ADMIN — LINAGLIPTIN 5 MG: 5 TABLET, FILM COATED ORAL at 12:04

## 2018-10-10 RX ADMIN — MUPIROCIN: 20 OINTMENT TOPICAL at 21:34

## 2018-10-10 RX ADMIN — Medication 400 MG: at 08:21

## 2018-10-10 RX ADMIN — Medication 2 G: at 08:22

## 2018-10-10 RX ADMIN — CHLORHEXIDINE GLUCONATE 10 ML: 1.2 RINSE ORAL at 18:06

## 2018-10-10 RX ADMIN — METOPROLOL TARTRATE 12.5 MG: 25 TABLET ORAL at 10:32

## 2018-10-10 RX ADMIN — SODIUM CHLORIDE 2.9 UNITS/HR: 900 INJECTION, SOLUTION INTRAVENOUS at 12:46

## 2018-10-10 RX ADMIN — Medication 10 ML: at 18:07

## 2018-10-10 RX ADMIN — MUPIROCIN: 20 OINTMENT TOPICAL at 08:22

## 2018-10-10 RX ADMIN — AMIODARONE HYDROCHLORIDE 400 MG: 200 TABLET ORAL at 08:21

## 2018-10-10 RX ADMIN — Medication 2 G: at 13:21

## 2018-10-10 RX ADMIN — Medication 10 ML: at 21:28

## 2018-10-10 RX ADMIN — OXYCODONE HYDROCHLORIDE 5 MG: 5 TABLET ORAL at 08:18

## 2018-10-10 RX ADMIN — Medication 2 G: at 01:56

## 2018-10-10 RX ADMIN — ASPIRIN 81 MG CHEWABLE TABLET 81 MG: 81 TABLET CHEWABLE at 08:21

## 2018-10-10 RX ADMIN — MORPHINE SULFATE 2 MG: 2 INJECTION, SOLUTION INTRAMUSCULAR; INTRAVENOUS at 15:52

## 2018-10-10 RX ADMIN — AMIODARONE HYDROCHLORIDE 400 MG: 200 TABLET ORAL at 18:05

## 2018-10-10 RX ADMIN — OXYCODONE HYDROCHLORIDE AND ACETAMINOPHEN 1000 MG: 500 TABLET ORAL at 08:21

## 2018-10-10 RX ADMIN — KETOROLAC TROMETHAMINE 15 MG: 30 INJECTION, SOLUTION INTRAMUSCULAR at 01:56

## 2018-10-10 RX ADMIN — SODIUM CHLORIDE 2.2 UNITS/HR: 900 INJECTION, SOLUTION INTRAVENOUS at 08:16

## 2018-10-10 RX ADMIN — SODIUM CHLORIDE 2.3 UNITS/HR: 900 INJECTION, SOLUTION INTRAVENOUS at 01:59

## 2018-10-10 RX ADMIN — INSULIN GLARGINE 18 UNITS: 100 INJECTION, SOLUTION SUBCUTANEOUS at 12:05

## 2018-10-10 RX ADMIN — SENNOSIDES AND DOCUSATE SODIUM 1 TABLET: 8.6; 5 TABLET ORAL at 08:21

## 2018-10-10 RX ADMIN — MAGNESIUM SULFATE IN WATER 2 G: 40 INJECTION, SOLUTION INTRAVENOUS at 16:52

## 2018-10-10 RX ADMIN — METOPROLOL TARTRATE 12.5 MG: 25 TABLET ORAL at 21:27

## 2018-10-10 RX ADMIN — POLYETHYLENE GLYCOL 3350 34 G: 17 POWDER, FOR SOLUTION ORAL at 08:21

## 2018-10-10 RX ADMIN — INSULIN LISPRO 2 UNITS: 100 INJECTION, SOLUTION INTRAVENOUS; SUBCUTANEOUS at 16:51

## 2018-10-10 RX ADMIN — ENOXAPARIN SODIUM 40 MG: 40 INJECTION, SOLUTION INTRAVENOUS; SUBCUTANEOUS at 23:30

## 2018-10-10 RX ADMIN — CHLORHEXIDINE GLUCONATE 10 ML: 1.2 RINSE ORAL at 08:22

## 2018-10-10 RX ADMIN — SODIUM CHLORIDE 3 UNITS/HR: 900 INJECTION, SOLUTION INTRAVENOUS at 11:37

## 2018-10-10 RX ADMIN — OXYCODONE HYDROCHLORIDE 5 MG: 5 TABLET ORAL at 13:21

## 2018-10-10 RX ADMIN — ACETAMINOPHEN 650 MG: 325 TABLET ORAL at 04:08

## 2018-10-10 RX ADMIN — OXYCODONE HYDROCHLORIDE 5 MG: 5 TABLET ORAL at 20:00

## 2018-10-10 RX ADMIN — FUROSEMIDE 20 MG: 10 INJECTION, SOLUTION INTRAMUSCULAR; INTRAVENOUS at 01:00

## 2018-10-10 RX ADMIN — METFORMIN HYDROCHLORIDE 1000 MG: 500 TABLET ORAL at 16:52

## 2018-10-10 RX ADMIN — POTASSIUM CHLORIDE 20 MEQ: 400 INJECTION, SOLUTION INTRAVENOUS at 05:13

## 2018-10-10 RX ADMIN — KETOROLAC TROMETHAMINE 15 MG: 30 INJECTION, SOLUTION INTRAMUSCULAR at 08:18

## 2018-10-10 RX ADMIN — Medication 10 ML: at 13:26

## 2018-10-10 RX ADMIN — SODIUM CHLORIDE 10 ML/HR: 450 INJECTION, SOLUTION INTRAVENOUS at 05:03

## 2018-10-10 RX ADMIN — OXYCODONE HYDROCHLORIDE 10 MG: 5 TABLET ORAL at 04:08

## 2018-10-10 RX ADMIN — AMIODARONE HYDROCHLORIDE 150 MG: 50 INJECTION, SOLUTION INTRAVENOUS at 20:13

## 2018-10-10 RX ADMIN — ACETAMINOPHEN 650 MG: 325 TABLET ORAL at 15:37

## 2018-10-10 RX ADMIN — OXYCODONE HYDROCHLORIDE 5 MG: 5 TABLET ORAL at 00:55

## 2018-10-10 RX ADMIN — SODIUM CHLORIDE 4.3 UNITS/HR: 900 INJECTION, SOLUTION INTRAVENOUS at 10:26

## 2018-10-10 RX ADMIN — Medication 10 ML: at 05:07

## 2018-10-10 RX ADMIN — METOPROLOL TARTRATE 5 MG: 1 INJECTION, SOLUTION INTRAVENOUS at 16:53

## 2018-10-10 NOTE — PROGRESS NOTES
Problem: Patient Education: Go to Patient Education Activity Goal: Patient/Family Education Occupational Therapy Goals Initiated 10/9/2018 1. Patient will perform ADLs standing 5 mins without fatigue or LOB with supervision/set-up within 7 day(s). 2.  Patient will perform lower body ADLs with supervision/set-up within 7 day(s). 3.  Patient will perform gathering ADL items high and low 2/2 with supervision/set-up within 7 day(s). 4.  Patient will perform toilet transfers with supervision/set-up within 7 day(s). 5.  Patient will perform all aspects of toileting with supervision/set-up within 7 day(s). 6.  Patient will participate in cardiac/sternal upper extremity therapeutic exercise/activities to increase independence with ADLs with supervision/set-up for 5 minutes within 7 day(s). Occupational Therapy TREATMENT Patient: Georgia Jacobo (63 y.o. male) Date: 10/10/2018 Diagnosis: CAD 
CAD (coronary artery disease) CAD (coronary artery disease), native coronary artery <principal problem not specified> Procedure(s) (LRB): 
CORONARY ARTERY BYPASS GRAFT TIMES THREE  WITH RIGHT SAPHENOUS VEIN GRAFT ( ENDOSCOPIC HARVEST ) AND LEFT INTERNAL MAMMARY ARTERY WITH EXTRA CORPOREAL CIRCULATION. TRACI AND EPIAORTIC ULTRAOUND DONE BY DR SANDOVAL [facility]. (N/A) 2 Days Post-Op Precautions: Fall, Sternal 
Chart, occupational therapy assessment, plan of care, and goals were reviewed. ASSESSMENT: 
Pt was cleared to be seen for therapy and all VSS and pt was supine in bed. He was able to state 3/3 sternal precautions and was min assist of 1 for bed mobility. Pt was CGA to stand and walk to the sink. He was CGA for grooming at the sink and after walking he was able to stand and perform his cardiac ex with 5 reps each. Pt is progressing well and family was present during tx. Recommend that pt have cardiac out pt after discharge. Progression toward goals: []       Improving appropriately and progressing toward goals 
[]       Improving slowly and progressing toward goals 
[]       Not making progress toward goals and plan of care will be adjusted PLAN: 
Patient continues to benefit from skilled intervention to address the above impairments. Continue treatment per established plan of care. Discharge Recommendations:  Outpatient Cardiac Rehab Further Equipment Recommendations for Discharge:  none SUBJECTIVE:  
Patient stated I dont have any pain right now.  The patient stated 3/3 sternal precautions. Reviewed all 3 with patient. OBJECTIVE DATA SUMMARY:  
Cognitive/Behavioral Status: 
  
  
  
 intact Functional Mobility and Transfers for ADLs: 
Bed Mobility: 
Rolling: Minimum assistance;Contact guard assistance;Assist x1 Sit to Supine: Contact guard assistance;Minimum assistance;Assist x1 Scooting: Contact guard assistance Transfers: 
Sit to Stand: Contact guard assistance Bed to Chair: Contact guard assistance Balance: 
Sitting: Intact Standing: Impaired; Without support Standing - Static: Good Standing - Dynamic : Fair ADL Intervention: 
  
Pt is setup for grooming and mod to max for UB ADLs and max for LB ADLs Patient instructed no asymmetrical reaching over head to ensure B UEs when shoulders >90* i.e. reaching in cabinets and dressing. Instruction on upper body dressing techniques of over head, then arms through to decrease pain and unilateral shoulder flexion >90*. Instruction on the benefits of utilizing B UEs during functional tasks i.e. opening the fridge, stepping into the tub. Instruction if continued pain at home with shoulder IR for BM hygiene can use wet wipes and toilet tongs PRN. Avoid valsalva maneuvers.  
May have to adjust home setup to increase ease with items closer to waist height to prevent deep bending and the automatic  of asymmetrical UE WB/pushing for stabilization during bending. Benefit to don clothing tailor sitting and don all clothing while sitting prior to standing. Patient demonstrated lower body dressing sitting  with max assist.  
Increase activity tolerance for home, work, and sexual intercourse by pacing self with increasing the arm exercises, sitting duration, frequency OOB, walking, standing, and ADLs. Instructed and indicated understanding of s/s of too much activity, how to respond to s/s safely. Therapeutic Exercises:  
Patient instructed on the benefits and demonstrated cardiac exercises while standing  with Contact guard assistance. Instructed and indicated understanding on how to progress reps, sets against gravity, working up to 5 lbs, standing and so on based on surgeon clearance for more weight in prep for basic and instrumental ADLs. Instruction on the use of household items in place of weights as needed. CARDIAC EXERCISE Sets Reps Active  Active Assist   
Passive  Self ROM Comments Shoulder flexion  1  5   [x]                            []                             []                             []                               
Shoulder abduction  1  5  [x]                             []                             []                             []                               
Scapular elevation  1  5  [x]                             []                              []                             []                               
Scapular retraction  1  5  [x]                             []                             []                             []                               
Trunk rotation  1  5  [x]                             []                             []                             []                               
Trunk sidebending  1  5  [x]                             []                              []                             []                                     
 
 
Pain: Pain Scale 1: Numeric (0 - 10) Pain Intensity 1: 3 Pain Location 1: Chest 
Pain Orientation 1: Anterior Pain Description 1: Stabbing Pain Intervention(s) 1: Medication (see MAR) Activity Tolerance:  
fair Please refer to the flowsheet for vital signs taken during this treatment. After treatment:  
[x] Patient left in no apparent distress sitting up in chair 
[] Patient left in no apparent distress in bed 
[x] Call bell left within reach [x] Nursing notified 
[x] Caregiver present 
[] Bed alarm activated COMMUNICATION/COLLABORATION:  
The patients plan of care was discussed with: Physical Therapist and Registered Nurse CAM Conteh Time Calculation: 35 mins

## 2018-10-10 NOTE — DIABETES MGMT
[facility] Cardiac Surgery Progress Note Recommendations/ Comments: Pt discussed during rounds today with Dr. Ele Medrano. Plan is transition off insulin gtt per protocol. Discussed resuming metformin and tradjenta with LÓPEZ Pendleton. Plan to to resume as this is pt's home regimen (pt takes onglyza at home, formulary replacement is tradjenta )  and pt transitioning off gtt today and diet advanced today. DTC attempted to meet with pt this am, pt sleeping. DTC will f/u with pt at later time for review of previous education discussed with DTC/ post-op DM education. Currently on insulin gtt. Chart reviewed on [facility] 
 
Patient is 63 y.o. male s/p Cardiac surgery  POD 2. Known Type 2 Diabetes on onglyza 5mg daily and metformin 1000mg ac b/d at home. A1c:  
Lab Results Component Value Date/Time Hemoglobin A1c 7.0 (H) 10/04/2018 02:12 PM  
 
 
 
Recent Glucose Results:  
Lab Results Component Value Date/Time  (H) 10/10/2018 03:11 AM  
 GLUCPOC 140 (H) 10/10/2018 10:25 AM  
 GLUCPOC 100 10/10/2018 08:14 AM  
 GLUCPOC 107 (H) 10/10/2018 06:11 AM  
  
 
Lab Results Component Value Date/Time Creatinine 0.98 10/10/2018 03:11 AM  
 
Estimated Creatinine Clearance: 117.6 mL/min (based on Cr of 0.98). Active Orders Diet DIET CARDIAC Regular PO intake:  
Patient Vitals for the past 72 hrs: 
 % Diet Eaten 10/10/18 0800 50 % Will continue to follow as needed. Thank you. Rochelle Reynoso [facility] Office: [phone]

## 2018-10-10 NOTE — PROGRESS NOTES
Cardiac Surgery ICU Progress Note Admit Date: 10/8/2018 POD: 2 Days Post-Op Problems:  Active Problems: 
  CAD (Coronary Artery Disease) - stent 1999 (10/20/2009) CAD (coronary artery disease), native coronary artery (10/8/2018) Procedure:  Procedure(s): CORONARY ARTERY BYPASS GRAFT TIMES THREE  WITH RIGHT SAPHENOUS VEIN GRAFT ( ENDOSCOPIC HARVEST ) AND LEFT INTERNAL MAMMARY ARTERY WITH EXTRA CORPOREAL CIRCULATION. TRACI AND EPIAORTIC ULTRAOUND DONE BY DR SANDOVAL [facility]. Summary:  
 
Stable hemodynamics in sinus rhythm without ectopy on no support. WBC 11 HH 9/27   130/12 hrs CXR poor inspiration Cr 0.9 UOP 1000 +215 Plan/Recommendations/Medical Decision Making:  
 
DC CTs 
DC central lines and mera Meds: aspirin/statin B-Blocker started ACE held for hypotension Too stepdown Anticipated acute blood loss anemia Increase activity Increase I/S effort and frequency Sternal precautions Stimulate bowel movement Patient seen and reviewed with  Dr. Wilner Cash MD 
 
 
 Objective: CXR Results  (Last 48 hours) 10/10/18 0512  XR CHEST PORT Final result Impression:  IMPRESSION:  
1. Improved aeration with decreasing basilar atelectasis Narrative:  EXAM:  XR CHEST PORT INDICATION:  Post op heart surgery, coronary artery disease, coronary artery  
bypass graft COMPARISON:  10/9/2018 FINDINGS: A portable AP radiograph of the chest was obtained at 0456 hours. The  
patient is on a cardiac monitor. The right IJ catheter overlies the SVC. [place]-Maddie catheter has been removed. Mediastinal pleural drains remain in place. Overall the aeration has improved. Atelectasis at the lung bases left greater  
than right has slightly decreased. Small pleural effusions are present. No  
pneumothorax. No pulmonary edema. 10/09/18 0510  XR CHEST PORT Final result  Impression:  IMPRESSION: No significant change following extubation and removal of  
nasogastric tube. Narrative:  EXAM:  XR CHEST PORT. INDICATION: Postop heart. COMPARISON: 10/8/2018. FINDINGS:   
A portable AP radiograph of the chest was obtained at 0457 hours. There are  
sternal sutures. Lines and tubes: The patient is on a cardiac monitor. The endotracheal tube and  
nasogastric tube have been removed. The right jugular triple-lumen and [place]-Maddie  
catheters are unchanged in position. The mediastinal drain and chest tubes are  
also unchanged. Lungs: There is atelectasis in the lower lung zones, unchanged. Pleura: There is no pneumothorax or pleural effusion. Mediastinum: The cardiac silhouette is enlarged. Bones and soft tissues: The bones and soft tissues are grossly within normal  
limits. 10/08/18 1508  XR CHEST PORT Final result Impression:  IMPRESSION:  
   
ETT in satisfactory position. Hypoexpanded lungs. Central vascular plethora presumably in part related to shallow ventilation. Scattered bilateral subsegmental atelectasis. Narrative:  INDICATION: Postop. Coronary artery disease. EXAM:  
   
Portable AP chest radiograph was obtained at 1443 hours on October 8, 2018. FINDINGS:  
   
Comparison studies:  October 4, 2018. The heart is top normal  in size. The lungs are hypo- expanded bilaterally. The visualized bones are grossly within normal limits. Tip of endotracheal tube is in satisfactory position. Nasogastric catheter is in  
place but its tip not visualized on this exam. [place]-Maddie catheter tip projected  
over the right intrapericardial pulmonary artery. Central pulmonary vascular  
plethora and scattered bilateral subsegmental atelectasis. Pleural/mediastinal  
drains are in place. Central line tip over caval-atrial junction region. Labs: Recent Labs 10/10/18 
 3880   10/10/18 
 0311   10/08/18 
 1450 WBC   --    --   11.1   < >  9.7 HGB   --    --   9.0*   < >  10.7* HCT   --    --   27.4*   < >  31.2*  
PLT   --    --   117*   < >  130* NA   --    --   139   < >  143 K   --    --   3.8   < >  4.1 BUN   --    --   19   < >  11  
CREA   --    --   0.98   < >  1.01  
GLU   --    --   105*   < >  129* GLUCPOC  100   < >   --    < >   --   
INR   --    --    --    --   1.2*  
 < > = values in this interval not displayed. Vitals: 
 
Visit Vitals  /66 (BP 1 Location: Left arm, BP Patient Position: At rest)  Pulse 73  Temp 98.4 °F (36.9 °C)  Resp 29  
 Ht 6' 1\" (1.854 m)  Wt 286 lb 13.1 oz (130.1 kg)  SpO2 93%  BMI 37.84 kg/m2 Temp (24hrs), Av.1 °F (37.3 °C), Min:97.8 °F (36.6 °C), Max:100.2 °F (37.9 °C) Last 3 Recorded Weights in this Encounter 10/07/18 1751 10/08/18 0544 10/10/18 7451 Weight: 275 lb 9.2 oz (125 kg) 270 lb 1 oz (122.5 kg) 286 lb 13.1 oz (130.1 kg) Oxygen Therapy: 
Oxygen Therapy O2 Sat (%): 93 % (10/10/18 0700) Pulse via Oximetry: 73 beats per minute (10/10/18 0800) O2 Device: Nasal cannula (10/10/18 0800) O2 Flow Rate (L/min): 4 l/min (10/10/18 0800) FIO2 (%): 50 % (10/09/18 2000) Admission Weight: Last Weight Weight: 275 lb 9.2 oz (125 kg) Weight: 286 lb 13.1 oz (130.1 kg) Intake / Output / Drain: 
Current Shift: 10/10 0701 - 10/10 1900 In: 271.5 [P.O.:250; I.V.:21.5] Out: 125 [Urine:65; Drains:60] Last 24 hrs.:  
Intake/Output Summary (Last 24 hours) at 10/10/18 3186 Last data filed at 10/10/18 0920 Gross per 24 hour Intake          1788.12 ml Output             1487 ml Net           301.12 ml EXAM:   
  General: 
 
  Chest:  Stable sternum Incisions: dry and intact Lungs:    Rhonchi bilaterally. Heart:  Regular rate and rhythm, S1, S2 normal, no murmur, no click, Abdomen:   Soft, non-tender. Bowel sounds present. Extremities:  mild edema Neurologic:  Gross motor and sensory apparatus intact. Signed By: LÓPEZ Harry

## 2018-10-10 NOTE — DIABETES MGMT
[facility] Cardiac Surgery Post -OP Education Progress Note Recommendations/ Comments:  Visited with pt today, pt's family members present in room during visit. Pt was drowsy during visit but able to respond during conversation. Pt shared that he did not remember previous discussion with [facility] last week prior to surgery. Educator reviewed/reinforced importance in checking BG 2x/daily when at home (pt still has meter). Pt's wife still checking BG for pt. Pt has made dietary changes such as switching to diet sodas. Reinforced importance of good BG control to reduce risk of infection and promote healing from surgery. Discussed that if appetite poor at home to avoid skipped meal and replace with snack if able to promote healing. Reinforced importance in f/u with PCP if BG remain elevated. Pt had no questions at this time. Provided pt with post-op BG management guidelines and Survival Skills education guide. Discussed pt will need to f/u with D[facility] after surgery for review of BG. Discussed transitioning of insulin gtt and resuming oral DM medications. Chart reviewed on Florian Griggs Sr. 
 
Patient is 63 y.o. male s/p Cardiac surgery  POD 2. Known Type 2 Diabetes on onglyza 5mg daily and metformin 1000mg ac b/d at home. A1c:  
Lab Results Component Value Date/Time Hemoglobin A1c 7.0 (H) 10/04/2018 02:12 PM  
 
 
 
Recent Glucose Results:  
Lab Results Component Value Date/Time  (H) 10/10/2018 03:11 AM  
 GLUCPOC 140 (H) 10/10/2018 01:55 PM  
 GLUCPOC 113 (H) 10/10/2018 12:45 PM  
 GLUCPOC 116 (H) 10/10/2018 11:36 AM  
  
 
Lab Results Component Value Date/Time Creatinine 0.98 10/10/2018 03:11 AM  
 
Estimated Creatinine Clearance: 117.6 mL/min (based on Cr of 0.98). Active Orders Diet DIET CARDIAC Regular PO intake:  
Patient Vitals for the past 72 hrs: 
 % Diet Eaten 10/10/18 0800 50 % Will continue to follow as needed. Thank you.  
Riya Zacarias RD 
 [facility] Office: [phone]

## 2018-10-10 NOTE — PROGRESS NOTES
2000 
Patient assessed. Encourage ICS to decrease oxygen therapy (6L NC/50%venti) . Patient NSR. BP stable. O2 91-95%. Lower lungs diminished, upper lobes clear. BS hypoactive. Pulses palpable. Sternal incision and RLE incison CDI. Insulin gtt infusing. 2020 Spoke with Dr. Steffi Aguilar. 185 UO since lasix given and informed him of patient's current VS, CVP, and oxygen therapy in use. Dr. Kapadia Sender would like 20 of IV lasix at 1 AM. Encourage ICS and give pain medication as patient requires to do this. Give an albumin only if cvp less than 10 ; if BP decreases hold lasix. [address] Patient resting, O2 sats 91-92% on 6L NC.  
 
2350 No change in assessment, upper lobes clear and lobe diminished. No complaints of pain. 0100 SBP 120s. CVP 14-18. UO about 30 an hour. Lasix given. ICS about 500, patient doing independently, but not many times each session, still continue to encourage and warn about consequences of not using the device to his best.  
 
0200 Scheduled toradol given. Patient resting comfortably. O2 sats 92-94% on 6L NC. UO picked up from 30 to 140.  
 
0400 Patient sweating. Asked RN to reposition. PRN nilo given for pain 7/10 with turning and trying to deep breath. Still only about 500 on ICS with encouragement. [id number], only sweat present at this time; no anxiety, nausea, and pt oriented. Will continue to monitor for signs of withdraw, pt has known ETOH use.  
 
0797 Order placed per protocol for one run of potassium for K 3.8 this AM. 2922 Patient feels much better he stated. Pain has improved, doing ICS independently to 800. Decreased nasal cannula to 5L. 7503 Updated  on UO, ICS usage, and morning xray via tiger text. 8515 Patient gotten up to recliner, 2 assist, did lisa well. Now on 4L Nc. 
 
650-UO 
130- CT Bedside and Verbal shift change report given to Serenity/ Aparna Haji RN (oncoming nurse) by Aixa Mason RN (offgoing nurse).  Report included the following information SBAR, Kardex, Intake/Output, MAR, Recent Results, Med Rec Status and Cardiac Rhythm NSR.

## 2018-10-10 NOTE — PROGRESS NOTES
0800 - Shift assessment completed. Pt up in chair with family present. 0845 - Pt back to bed.  
 
0915 - Pendleton at bedside to remove chest tubes. 0920 - Parish removed. 1200 - Reassessment completed. No change in pt status. 1530 - Pt temp of 101.2. Spoke to Dr Anneliese Christensen, advised to give PRN tylenol, get up to chair and push IS use. 1537 - PRN Tylenol given for temp of 101.2 
 
1700 - Spoke to Dr Nnamdi Zelaya regarding pt temp and heart rhythm:Afib. Ordered 2g Mag and 5 mg IV Metoprolol. TRANSFER - OUT REPORT: 
 
Verbal report given to Ricardo(name) on Cliff Garcia Sr  being transferred to PCU(unit) for routine progression of care Report consisted of patients Situation, Background, Assessment and  
Recommendations(SBAR). Information from the following report(s) SBAR, ED Summary, Procedure Summary, Intake/Output, MAR, Recent Results and Cardiac Rhythm NSR-A-Fib was reviewed with the receiving nurse. Lines:  
Triple Lumen 10/08/18 Right Internal jugular (Active) Central Line Being Utilized Yes 10/10/2018 12:00 PM  
Criteria for Appropriate Use Hemodynamically unstable, requiring monitoring lines, vasopressors, or volume resuscitation 10/10/2018 12:00 PM  
Site Assessment Clean, dry, & intact 10/10/2018 12:00 PM  
Infiltration Assessment 0 10/10/2018 12:00 PM  
Affected Extremity/Extremities Color distal to insertion site pink (or appropriate for race) 10/10/2018 12:00 PM  
Date of Last Dressing Change 10/08/18 10/10/2018  4:05 AM  
Dressing Status Clean, dry, & intact 10/10/2018 12:00 PM  
Dressing Type Transparent 10/10/2018 12:00 PM  
Action Taken Open ports on tubing capped 10/10/2018 12:00 PM  
Proximal Hub Color/Line Status White; Infusing 10/10/2018 12:00 PM  
Positive Blood Return (Medial Site) Yes 10/10/2018 12:00 PM  
Medial Hub Color/Line Status Blue; Infusing 10/10/2018 12:00 PM  
Positive Blood Return (Lateral Site) Yes 10/10/2018 12:00 PM  
 Distal Hub Color/Line Status Jose M Gun; Infusing 10/10/2018 12:00 PM  
Positive Blood Return (Site #3) Yes 10/10/2018 12:00 PM  
Alcohol Cap Used Yes 10/10/2018 12:00 PM  
   
Peripheral IV 10/08/18 Left Hand (Active) Site Assessment Clean, dry, & intact 10/10/2018 12:00 PM  
Phlebitis Assessment 0 10/10/2018 12:00 PM  
Infiltration Assessment 0 10/10/2018 12:00 PM  
Dressing Status Clean, dry, & intact 10/10/2018 12:00 PM  
Dressing Type Transparent 10/10/2018 12:00 PM  
Hub Color/Line Status Pink;Capped 10/10/2018 12:00 PM  
  
 
Opportunity for questions and clarification was provided. Patient transported with: 
 Registered Nurse

## 2018-10-10 NOTE — PROGRESS NOTES
[facility] Consult Service Progress NOTEPulmonary, Critical Care, and Sleep Medicine Name: Georgia Jacobo MRN: [id number] : 1960 Hospital: [facility] Date: 10/10/2018  Admission Date: 10/8/2018 Hospital Day: 3 Chart and notes reviewed. Data reviewed. I have evaluated and examined the patient. Still with pain, still with O2 requirements. Sleep apnea but stopped using CPAP. Snores. Weight stable past five yrs. OOB in chair now. IMPRESSION:  
1. CAD 2. S/p CABG 10/10/2018 3. Coronary Artery Bypass Grafting x 3, LIMA to LAD, RSVG Sequenced to PDA, OM; Left GSV [facility] 4. Post op hypoxia- still on O2- note on NC O2; due to h/o smoking plus poor inspiratory effort, post op atelectasis as noted in CXR report 5. Diabetes ([address] 6. HTN 
7. GERD (gastroesophageal reflux disease) 8. Gout 9. Sleep apnea untreated 10. Morbid obesity 11. Heart attack (UCI)4846 stents placed 12. 12 Cans of beer per week Comment: 2 beers per week 13. Smoker Packs/day:0.25 RECOMMENDATIONS/PLAN:  
1. O2 
2. Pt education regarding sleep apnea- after he has healed, may need reevaluation 3. Pain control and Incentive spirometry should help with lung expansion and VQ mismatch 4. Bronchodilators prn 5. Post op care per protocol 6. Glycemic control 7. Hemodynamics per cardiac surgery 8. Chest tubes per CTS 9. Smoking cessation counseling 10. PT, OT 11. DVT, SUP prophylaxiss My assessment/management discussed with: Cardiothoracic surgery, Cardiology, Consultants, Nursing, Pharmacy, Case Management, PT, OT, Respiratory Therapy, Nutrition, Diabetes specialist and Family for coordination of care Pt's condition is acute and unstable requiring inpatient hospitalization.  This care involved high complexity decision making which includes independently reviewing the patient's past medical records, current laboratory results, medication profiles that were immediately available to me and actual Xray images at the bedside in order to assess, support vital system function, and to treat this degree of vital organ system failure, and to prevent further deterioration of the patients condition. Risk of deterioration: high  
[x] High complexity decision making was performed [x] See my orders for details Social History Substance Use Topics  Smoking status: Light Tobacco Smoker Packs/day: 0.25 Years: 10.00 Types: Cigarettes  Smokeless tobacco: Never Used  Alcohol use Yes Comment: 22 drinks per week Family History Problem Relation Age of Onset  Diabetes Mother  No Known Problems Father No Known Allergies MAR reviewed and pertinent medications noted or modified as needed Current Facility-Administered Medications Medication  metoprolol tartrate (LOPRESSOR) tablet 12.5 mg  
 PHENYLephrine (KATHRINE-SYNEPHRINE) 30 mg in 0.9% sodium chloride 250 mL infusion  enoxaparin (LOVENOX) injection 40 mg  
 ascorbic acid (vitamin C) (VITAMIN C) tablet 1,000 mg  mupirocin (BACTROBAN) 2 % ointment  calcium chloride 1 g in 0.9% sodium chloride 250 mL IVPB  oxyCODONE IR (ROXICODONE) tablet 5-10 mg  
 sodium chloride (NS) flush 5-10 mL  sodium chloride (NS) flush 5-10 mL  albumin human 5% (BUMINATE) solution 12.5 g  
 0.45% sodium chloride infusion  
 0.9% sodium chloride infusion  acetaminophen (TYLENOL) tablet 650 mg  
 morphine injection 1-2 mg  
 naloxone (NARCAN) injection 0.4 mg  
 ceFAZolin (ANCEF) 2 g/20 mL in sterile water IV syringe  amiodarone (CORDARONE) tablet 400 mg  
 atorvastatin (LIPITOR) tablet 20 mg  
 ondansetron (ZOFRAN) injection 4 mg  albuterol (PROVENTIL VENTOLIN) nebulizer solution 2.5 mg  
 aspirin chewable tablet 81 mg  
 chlorhexidine (PERIDEX) 0.12 % mouthwash 10 mL  magnesium oxide (MAG-OX) tablet 400 mg  
  polyethylene glycol (MIRALAX) packet 34 g  
 senna-docusate (PERICOLACE) 8.6-50 mg per tablet 1 Tab  ELECTROLYTE REPLACEMENT PROTOCOL  magnesium sulfate 1 g/100 ml IVPB (premix or compounded)  insulin regular (NOVOLIN R, HUMULIN R) 100 Units in 0.9% sodium chloride 100 mL infusion  glucose chewable tablet 16 g  
 dextrose (D50W) injection syrg 12.5-25 g  
 glucagon (GLUCAGEN) injection 1 mg  
 insulin lispro (HUMALOG) injection  amiodarone (CORDARONE) 900 mg/250 ml D5W infusion Vital Signs: Intake/Output: Intake/Output:  
Visit Vitals  /66 (BP 1 Location: Left arm, BP Patient Position: At rest)  Pulse 73  Temp 98.4 °F (36.9 °C)  Resp 29  
 Ht 6' 1\" (1.854 m)  Wt 130.1 kg (286 lb 13.1 oz)  SpO2 93%  BMI 37.84 kg/m2 Temp (24hrs), Av.1 °F (37.3 °C), Min:97.8 °F (36.6 °C), Max:100.2 °F (37.9 °C) Telemetry: PACED O2 Device: Nasal cannula O2 Flow Rate (L/min): 4 l/min Wt Readings from Last 4 Encounters:  
10/10/18 130.1 kg (286 lb 13.1 oz) 10/04/18 125 kg (275 lb 9.2 oz)  
10/02/18 122.5 kg (270 lb)  
18 124.3 kg (274 lb) Intake/Output Summary (Last 24 hours) at 10/10/18 7351 Last data filed at 10/10/18 0920 Gross per 24 hour Intake          1788.12 ml Output             1487 ml Net           301.12 ml Last shift:      10/10 0701 - 10/10 1900 In: 271.5 [P.O.:250; I.V.:21.5] Out: 125 [Urine:65; Drains:60] Last 3 shifts: 10/08 1901 - 10/10 07 In: 3164.2 [P.O.:580; I.V.:2584.2] Out: 2958 [Urine:1614; RCXHZV:212] Physical Exam:  
 General: tall, muscular,  male HEAD: Normocephalic, without obvious abnormality, atraumatic EYES:anicteric NOSE: nares normal, no drainage, no flaring, THROAT: mucous membranes dry; Lips, mucosa dry; intubated Neck: Supple, symmetrical, trachea midline Lungs: decreased air exchange bilaterally with few basilar rales Heart: Regular rate and rhythm, no murmur Abdomen: soft, protuberant Extremity: negative, cyanosis, clubbing Neuro: no focal findings Psych: oriented to time, place and person Skin: Pallor; ;  
 
Data:  
Labs: 
Recent Labs 10/10/18 
 0311  10/09/18 
 0430  10/08/18 
 1934  10/08/18 
 1450 WBC  11.1  15.3*   --   9.7 HGB  9.0*  10.6*  11.1*  10.7* HCT  27.4*  31.0*  32.6*  31.2*  
PLT  117*  167   --   130* Recent Labs 10/10/18 
 0311  10/09/18 
 0430  10/08/18 
 1934  10/08/18 
 1450 NA  139  140  142  143  
K  3.8  4.4  4.6  4.1 CL  107  110*  111*  112* CO2  25  23  25  26 GLU  105*  97  127*  129* BUN  19  15  13  11 CREA  0.98  0.98  0.99  1.01  
CA  8.0*  8.0*  8.6  7.6*  
MG  2.3  2.2  2.0  2.1 ALB  3.6  3.5  4.1  3.7 TBILI  1.0  0.6  0.9  0.7 SGOT  66*  53*  47*  34 ALT  23  30  37  30 INR   --    --    --   1.2* Recent Labs 10/08/18 
 1719  10/08/18 
 1652  10/08/18 026 848 14 90 PH  7.36  7.35  7.34* PCO2  40  44  47* PO2  60*  75*  137* HCO3  22  24  25 FIO2  30  60  100 Imaging: 
I have personally reviewed the patients radiographs: 
Postop, mild edema, low lung volumes Beni Padilla MD

## 2018-10-10 NOTE — PROGRESS NOTES
1735-Pt arrived to unit. VSS. Complaints of 5/10 incional pain but declined medication at this time. Pt up to recliner for dinner. Family at bedside. . 
 
 
Primary Nurse Marii Harris RN and Arron Kellogg RN performed a dual skin assessment on this patient Impairment noted- see wound doc flow sheet Paul score is 19 Pt has small blanchable skin tear on left buttocks. 1835- Ambulated pt to bathroom with 1 assist.  
 
1915- Bedside shift change report given to Maria Del Carmen Rajput and Alexia Ulloa (oncoming nurse) by Zeynep Linda  (offgoing nurse). Report included the following information SBAR, Kardex, ED Summary, Procedure Summary, Intake/Output, MAR, Recent Results and Cardiac Rhythm Afib.

## 2018-10-10 NOTE — PROGRESS NOTES
Problem: Mobility Impaired (Adult and Pediatric) Goal: *Acute Goals and Plan of Care (Insert Text) Physical Therapy Goals Initiated 10/9/2018 1. Patient will move from supine to sit and sit to supine , scoot up and down and roll side to side in bed with modified independence within 5 days. 2.  Patient will perform sit to/from stand with modified independence within 5 days. 3.  Patient will ambulate 400 feet with least restrictive assistive device and independence within 5 days. 4.  Patient will ascend/descend 14 stairs with 1 handrail(s) with modified independence within 5 days. 5.  Patient will perform cardiac exercises per protocol with independence within 5 days. 6.  Patient will verbally and functionally recall 3/3 sternal precautions within 5 days. physical Therapy TREATMENT Patient: Erasmo Hdz (63 y.o. male) Date: 10/10/2018 Diagnosis: CAD 
CAD (coronary artery disease) CAD (coronary artery disease), native coronary artery <principal problem not specified> Procedure(s) (LRB): 
CORONARY ARTERY BYPASS GRAFT TIMES THREE  WITH RIGHT SAPHENOUS VEIN GRAFT ( ENDOSCOPIC HARVEST ) AND LEFT INTERNAL MAMMARY ARTERY WITH EXTRA CORPOREAL CIRCULATION. TRACI AND EPIAORTIC ULTRAOUND DONE BY DR Joselo Chaudhari. (N/A) 2 Days Post-Op Precautions: Fall, Sternal 
Chart, physical therapy assessment, plan of care and goals were reviewed. ASSESSMENT: 
Pt received supine in bed with RN and family present and agreeable to PT intervention. Pt cleared by nursing for mobility. Pt with good progress towards therapy goals this date. Reported 0/10 pain at rest and with activity. HR and BP stable throughout activity, however SaO2 88-92% on 4 L/min O2 NC. Pt able to recall 3/3 sternal precautions and only needed min cueing for adherence. Bed mobility performed with min A x 1 and CGA of another for log rolling and transferring supine>sit.  Sitting balance intact. Sit<>stand transfers performed with CGA. Pt able to stand at sink x ~ 3 minutes to perform ADLs with CGA. He ambulated 125 ft with CGA while pushing cardiac cart, exhibiting improvements in gait speed and step length DORINA this date. Needs constant cueing for deep breathing and relaxation during activity to improve and maintain SaO2 >90%. Pt performed cardiac exercises as noted below at the end of therapy session. Pt was left sitting in bedside chair with all needs met, RN aware, VSS on 4 L/min O2 NC, and family present following therapy session. Recommend pt discharge home with family support and OP cardiac rehab once cleared medically by MD. 
 
Progression toward goals: 
[x]      Improving appropriately and progressing toward goals 
[]      Improving slowly and progressing toward goals 
[]      Not making progress toward goals and plan of care will be adjusted PLAN: 
Patient continues to benefit from skilled intervention to address the above impairments. Continue treatment per established plan of care. Discharge Recommendations:  Outpatient Cardiac Rehab Further Equipment Recommendations for Discharge:  TBD - likely none SUBJECTIVE:  
Patient stated Kendra Gauze are going shopping.  The patient stated 3/3 sternal precautions. Reviewed all 3 with patient. OBJECTIVE DATA SUMMARY:  
Patient mobilized on continuous portable monitor/telemetry. Critical Behavior: 
Neurologic State: Alert, Appropriate for age Orientation Level: Oriented X4 Cognition: Follows commands Safety/Judgement: Awareness of environment, Fall prevention Functional Mobility Training: 
Bed Mobility: 
Log Rolling: Minimum assistance;Contact guard assistance;Assist x1 Sit to Supine: Contact guard assistance;Minimum assistance;Assist x1 Scooting: Contact guard assistance Transfers: 
Sit to Stand: Contact guard assistance Stand to Sit: Contact guard assistance Bed to Chair: Contact guard assistance Balance: 
Sitting: Intact Standing: Impaired; Without support Standing - Static: Good Standing - Dynamic : Fair Ambulation/Gait Training: 
Distance (ft): 125 Feet (ft) Assistive Device: Gait belt (pushing cardiac cart) Ambulation - Level of Assistance: Contact guard assistance;Assist x1;Additional time Gait Abnormalities: Decreased step clearance;Trunk sway increased Base of Support: Widened Speed/Guerita: Pace decreased (<100 feet/min) Step Length: Left shortened;Right shortened Therapeutic Exercises:  
Patient instructed on the benefits and demonstrated cardiac exercises while standing with instruction and min cueing for proper performance. Instructed and indicated understanding on how to progress reps and sets against gravity, working up to 5 lbs and so on based on surgeon clearance for more weight in prep for functional activity. Can use household items for weights. CARDIAC EXERCISE Sets Reps Active Active Assist  
Passive Self ROM Comments Shoulder flexion 1 5 [x]                                            []                                            []                                            []                                              
Shoulder abduction 1  5 [x]                                            []                                            []                                            []                                              
Scapular elevation 1 5 [x]                                            []                                            []                                            []                                              
Scapular retraction 1 5 [x]                                            []                                            []                                            []                                              
Trunk rotation 1 5 [x]                                            [] []                                            []                                              
Trunk sidebending 1 5 [x]                                            []                                            []                                            []                                              
   []                                            []                                            []                                            []                                              
 
Pain: 
Pain Scale 1: Numeric (0 - 10) Pain Intensity 1: 0 Pain Location 1: Chest 
Pain Orientation 1: Anterior Pain Description 1: Stabbing Pain Intervention(s) 1: Medication (see MAR) Activity Tolerance:  
Improving - increased gait distance; 0/10 pain; SaO2 88-92% on 4 L/min O2 NC; BP and HR stable throughout Please refer to the flowsheet for vital signs taken during this treatment. After treatment:  
[x] Patient left in no apparent distress sitting up in chair 
[] Patient left in no apparent distress in bed 
[x] Call bell left within reach [x] Nursing notified 
[x] Caregiver present 
[] Bed alarm activated COMMUNICATION/COLLABORATION:  
The patients plan of care was discussed with: Physical Therapist, Occupational Therapist and Registered Nurse Nitza Degroot PT, DPT Time Calculation: 35 mins

## 2018-10-10 NOTE — PROGRESS NOTES
Problem: Falls - Risk of 
Goal: *Absence of Falls Document Rashawn Sanderson Fall Risk and appropriate interventions in the flowsheet. Outcome: Progressing Towards Goal 
Fall Risk Interventions: 
Mobility Interventions: Assess mobility with egress test, Bed/chair exit alarm, Communicate number of staff needed for ambulation/transfer, Mechanical lift, OT consult for ADLs, Patient to call before getting OOB, PT Consult for assist device competence, Strengthening exercises (ROM-active/passive), Utilize gait belt for transfers/ambulation, Utilize walker, cane, or other assistive device, PT Consult for mobility concerns Medication Interventions: Assess postural VS orthostatic hypotension, Bed/chair exit alarm, Patient to call before getting OOB, Teach patient to arise slowly, Utilize gait belt for transfers/ambulation, Evaluate medications/consider consulting pharmacy Elimination Interventions: Bed/chair exit alarm, Call light in reach, Patient to call for help with toileting needs, Toileting schedule/hourly rounds Problem: Pressure Injury - Risk of 
Goal: *Prevention of pressure injury Document Paul Scale and appropriate interventions in the flowsheet. Outcome: Progressing Towards Goal 
Pressure Injury Interventions: 
Sensory Interventions: Assess changes in LOC, Assess need for specialty bed, Avoid rigorous massage over bony prominences, Chair cushion, Check visual cues for pain, Float heels, Keep linens dry and wrinkle-free, Minimize linen layers, Monitor skin under medical devices, Pressure redistribution bed/mattress (bed type), Sit a 90-degree angle/use footstool if needed, Suspension boots, Turn and reposition approx. every two hours (pillows and wedges if needed), Use 30-degree side-lying position, Pad between skin to skin, Maintain/enhance activity level, Discuss PT/OT consult with provider Activity Interventions: Assess need for specialty bed, Chair cushion, Increase time out of bed, Pressure redistribution bed/mattress(bed type), Trapeze to reposition, PT/OT evaluation Mobility Interventions: Assess need for specialty bed, Chair cushion, Float heels, HOB 30 degrees or less, Pressure redistribution bed/mattress (bed type), PT/OT evaluation, Suspension boots, Trapeze to reposition, Turn and reposition approx. every two hours(pillow and wedges) Nutrition Interventions: Document food/fluid/supplement intake, Discuss nutritional consult with provider, Offer support with meals,snacks and hydration Friction and Shear Interventions: Apply protective barrier, creams and emollients, Feet elevated on foot rest, Foam dressings/transparent film/skin sealants, HOB 30 degrees or less, Lift team/patient mobility team, Minimize layers, Sit at 90-degree angle, Transfer aides:transfer board/Florinda lift/ceiling lift, Lift sheet, Transferring/repositioning devices, Trapeze to reposition

## 2018-10-10 NOTE — CDMP QUERY
Account Number: [de-identified] MRN: [id number] Patient: Milagros Ascencio 
Created: [id number] Clinician Name: Jose Ren RN, CCDS Dr. Ivette Harry MD : 
Documentation of \"postop hypoxia is noted on this patient that underwent CABG x 3. Please clarify if this patient is (was) being treated/managed for:  
 
=> Chronic respiratory failure with hypoxia s/p CABG with hx  cigarette smoking 
=> Acute pulmonary insufficiency  in the setting of untreated sleep apnea & hx of cigarette smoking  s/p CABG 
=> Other explanation of clinical findings 
=> Clinically Undetermined (no explanation for clinical findings) The medical record reflects the following clinical findings, treatment, and risk factors. Risk Factors:  62 yo M w/ hx of cigarette smoking, untreated sleep apnea, s/p CABG on vent, CAD, pain Clinical Indicators:  SOB, tachypneic RR  24 -29, O2 sats in 80s, CXR atelectasis, weak cough effort, on 6L NC, satting 94%. ,  6L NC/50%venti. 10/10  PT:  SaO2 88-92% on 4L/min via NC. Treatment: ICS, serial ABG, titration of supplemental O2 from ventimask to NC after extubation, duonebs, Please clarify and document your clinical opinion in the progress notes and discharge summary including the definitive and/or presumptive diagnosis, (suspected or probable), related to the above clinical findings. Please include clinical findings supporting your diagnosis.  
Thank Janeth Davila RN, CCDS

## 2018-10-11 ENCOUNTER — APPOINTMENT (OUTPATIENT)
Dept: GENERAL RADIOLOGY | Age: 58
DRG: 236 | End: 2018-10-11
Attending: THORACIC SURGERY (CARDIOTHORACIC VASCULAR SURGERY)
Payer: COMMERCIAL

## 2018-10-11 ENCOUNTER — APPOINTMENT (OUTPATIENT)
Dept: GENERAL RADIOLOGY | Age: 58
DRG: 236 | End: 2018-10-11
Attending: PHYSICIAN ASSISTANT
Payer: COMMERCIAL

## 2018-10-11 ENCOUNTER — HOME HEALTH ADMISSION (OUTPATIENT)
Dept: HOME HEALTH SERVICES | Facility: HOME HEALTH | Age: 58
End: 2018-10-11
Payer: COMMERCIAL

## 2018-10-11 LAB
ANION GAP SERPL CALC-SCNC: 7 MMOL/L (ref 5–15)
BUN SERPL-MCNC: 18 MG/DL (ref 6–20)
BUN/CREAT SERPL: 22 (ref 12–20)
CALCIUM SERPL-MCNC: 8.1 MG/DL (ref 8.5–10.1)
CHLORIDE SERPL-SCNC: 105 MMOL/L (ref 97–108)
CO2 SERPL-SCNC: 24 MMOL/L (ref 21–32)
CREAT SERPL-MCNC: 0.81 MG/DL (ref 0.7–1.3)
ERYTHROCYTE [DISTWIDTH] IN BLOOD BY AUTOMATED COUNT: 13 % (ref 11.5–14.5)
GLUCOSE BLD STRIP.AUTO-MCNC: 120 MG/DL (ref 65–100)
GLUCOSE BLD STRIP.AUTO-MCNC: 141 MG/DL (ref 65–100)
GLUCOSE BLD STRIP.AUTO-MCNC: 141 MG/DL (ref 65–100)
GLUCOSE BLD STRIP.AUTO-MCNC: 144 MG/DL (ref 65–100)
GLUCOSE SERPL-MCNC: 136 MG/DL (ref 65–100)
HCT VFR BLD AUTO: 27 % (ref 36.6–50.3)
HGB BLD-MCNC: 9.1 G/DL (ref 12.1–17)
MAGNESIUM SERPL-MCNC: 2.3 MG/DL (ref 1.6–2.4)
MCH RBC QN AUTO: 29.5 PG (ref 26–34)
MCHC RBC AUTO-ENTMCNC: 33.7 G/DL (ref 30–36.5)
MCV RBC AUTO: 87.7 FL (ref 80–99)
NRBC # BLD: 0 K/UL (ref 0–0.01)
NRBC BLD-RTO: 0 PER 100 WBC
PLATELET # BLD AUTO: 126 K/UL (ref 150–400)
PMV BLD AUTO: 10.1 FL (ref 8.9–12.9)
POTASSIUM SERPL-SCNC: 4.2 MMOL/L (ref 3.5–5.1)
RBC # BLD AUTO: 3.08 M/UL (ref 4.1–5.7)
SERVICE CMNT-IMP: ABNORMAL
SODIUM SERPL-SCNC: 136 MMOL/L (ref 136–145)
WBC # BLD AUTO: 10.4 K/UL (ref 4.1–11.1)

## 2018-10-11 PROCEDURE — 83735 ASSAY OF MAGNESIUM: CPT | Performed by: PHYSICIAN ASSISTANT

## 2018-10-11 PROCEDURE — 36415 COLL VENOUS BLD VENIPUNCTURE: CPT | Performed by: PHYSICIAN ASSISTANT

## 2018-10-11 PROCEDURE — 80048 BASIC METABOLIC PNL TOTAL CA: CPT | Performed by: PHYSICIAN ASSISTANT

## 2018-10-11 PROCEDURE — 77010033678 HC OXYGEN DAILY

## 2018-10-11 PROCEDURE — 74011636637 HC RX REV CODE- 636/637: Performed by: PHYSICIAN ASSISTANT

## 2018-10-11 PROCEDURE — 74011250637 HC RX REV CODE- 250/637: Performed by: PHYSICIAN ASSISTANT

## 2018-10-11 PROCEDURE — 97116 GAIT TRAINING THERAPY: CPT

## 2018-10-11 PROCEDURE — 85027 COMPLETE CBC AUTOMATED: CPT | Performed by: PHYSICIAN ASSISTANT

## 2018-10-11 PROCEDURE — 97535 SELF CARE MNGMENT TRAINING: CPT

## 2018-10-11 PROCEDURE — 82962 GLUCOSE BLOOD TEST: CPT

## 2018-10-11 PROCEDURE — 65660000000 HC RM CCU STEPDOWN

## 2018-10-11 PROCEDURE — 74011000250 HC RX REV CODE- 250: Performed by: PHYSICIAN ASSISTANT

## 2018-10-11 PROCEDURE — 71046 X-RAY EXAM CHEST 2 VIEWS: CPT

## 2018-10-11 RX ADMIN — ACETAMINOPHEN 650 MG: 325 TABLET ORAL at 15:15

## 2018-10-11 RX ADMIN — ATORVASTATIN CALCIUM 20 MG: 20 TABLET, FILM COATED ORAL at 09:22

## 2018-10-11 RX ADMIN — CHLORHEXIDINE GLUCONATE 10 ML: 1.2 RINSE ORAL at 09:31

## 2018-10-11 RX ADMIN — CHLORHEXIDINE GLUCONATE 10 ML: 1.2 RINSE ORAL at 17:53

## 2018-10-11 RX ADMIN — Medication 10 ML: at 21:13

## 2018-10-11 RX ADMIN — LINAGLIPTIN 5 MG: 5 TABLET, FILM COATED ORAL at 09:26

## 2018-10-11 RX ADMIN — Medication 10 ML: at 05:39

## 2018-10-11 RX ADMIN — Medication 400 MG: at 17:44

## 2018-10-11 RX ADMIN — MUPIROCIN: 20 OINTMENT TOPICAL at 09:31

## 2018-10-11 RX ADMIN — POLYETHYLENE GLYCOL 3350 34 G: 17 POWDER, FOR SOLUTION ORAL at 09:23

## 2018-10-11 RX ADMIN — METOPROLOL TARTRATE 12.5 MG: 25 TABLET ORAL at 09:22

## 2018-10-11 RX ADMIN — Medication 400 MG: at 09:23

## 2018-10-11 RX ADMIN — INSULIN LISPRO 2 UNITS: 100 INJECTION, SOLUTION INTRAVENOUS; SUBCUTANEOUS at 13:06

## 2018-10-11 RX ADMIN — BUMETANIDE 1 MG: 1 TABLET ORAL at 09:22

## 2018-10-11 RX ADMIN — MUPIROCIN: 20 OINTMENT TOPICAL at 21:14

## 2018-10-11 RX ADMIN — ASPIRIN 81 MG CHEWABLE TABLET 81 MG: 81 TABLET CHEWABLE at 08:41

## 2018-10-11 RX ADMIN — OXYCODONE HYDROCHLORIDE 10 MG: 5 TABLET ORAL at 13:06

## 2018-10-11 RX ADMIN — OXYCODONE HYDROCHLORIDE AND ACETAMINOPHEN 1000 MG: 500 TABLET ORAL at 08:43

## 2018-10-11 RX ADMIN — METOPROLOL TARTRATE 12.5 MG: 25 TABLET ORAL at 21:11

## 2018-10-11 RX ADMIN — ALLOPURINOL 300 MG: 100 TABLET ORAL at 08:41

## 2018-10-11 RX ADMIN — OXYCODONE HYDROCHLORIDE 10 MG: 5 TABLET ORAL at 05:39

## 2018-10-11 RX ADMIN — POTASSIUM CHLORIDE 20 MEQ: 750 TABLET, FILM COATED, EXTENDED RELEASE ORAL at 09:22

## 2018-10-11 RX ADMIN — AMIODARONE HYDROCHLORIDE 400 MG: 200 TABLET ORAL at 17:44

## 2018-10-11 RX ADMIN — METFORMIN HYDROCHLORIDE 1000 MG: 500 TABLET ORAL at 08:41

## 2018-10-11 RX ADMIN — OXYCODONE HYDROCHLORIDE 5 MG: 5 TABLET ORAL at 20:23

## 2018-10-11 RX ADMIN — OXYCODONE HYDROCHLORIDE 5 MG: 5 TABLET ORAL at 01:03

## 2018-10-11 RX ADMIN — SENNOSIDES AND DOCUSATE SODIUM 1 TABLET: 8.6; 5 TABLET ORAL at 08:44

## 2018-10-11 RX ADMIN — METFORMIN HYDROCHLORIDE 1000 MG: 500 TABLET ORAL at 17:44

## 2018-10-11 RX ADMIN — INSULIN LISPRO 2 UNITS: 100 INJECTION, SOLUTION INTRAVENOUS; SUBCUTANEOUS at 17:44

## 2018-10-11 RX ADMIN — AMIODARONE HYDROCHLORIDE 400 MG: 200 TABLET ORAL at 08:40

## 2018-10-11 NOTE — PROGRESS NOTES
[facility] FLOOR Progress Note Admit Date: 10/8/2018 POD: 3 Days Post-Op Assessment:  
 
Active Problems: 
  CAD (Coronary Artery Disease) - stent  (10/20/2009) CAD (coronary artery disease), native coronary artery (10/8/2018) Procedure(s): CORONARY ARTERY BYPASS GRAFT TIMES THREE  WITH RIGHT SAPHENOUS VEIN GRAFT ( ENDOSCOPIC HARVEST ) AND LEFT INTERNAL MAMMARY ARTERY WITH EXTRA CORPOREAL CIRCULATION. TRACI AND EPIAORTIC ULTRAOUND DONE BY DR Aura Avelar. Summary Stable hemodynamics in sinus rhythm without ectopy on no support. OOB to chair, working w PT/OT 
WBC 10 HH   CXR: Moderate bibasilar atelectasis with small pleural effusions. CR 0.81 
/24 hrs +120 Plan/Recommendations/Medical Decision Making: ASA/statin/BB/Bumex Home tomorrow? ACE held for hypotension. Increase activity Increase I/S effort and frequency Sternal precautions Stimulate bowel movement Patient seen and reviewed with Dr. Lobito Bal MD 
 
 
Objective:  
 
 
Visit Vitals  /72 (BP 1 Location: Left arm, BP Patient Position: Sitting)  Pulse 79  Temp 99.3 °F (37.4 °C)  Resp 18  Ht 6' 1\" (1.854 m)  Wt 281 lb 12 oz (127.8 kg)  SpO2 96%  BMI 37.17 kg/m2 Temp (24hrs), Av.3 °F (37.4 °C), Min:98.2 °F (36.8 °C), Max:101.2 °F (38.4 °C) Last 3 Recorded Weights in this Encounter 10/08/18 6716 10/10/18 0521 10/11/18 9751 Weight: 270 lb 1 oz (122.5 kg) 286 lb 13.1 oz (130.1 kg) 281 lb 12 oz (127.8 kg) CXR Results  (Last 48 hours) 10/11/18 0749  XR CHEST PA LAT Final result Impression:  IMPRESSION:  
1. Moderate bibasilar atelectasis with small pleural effusions. Narrative:  Exam:  2 view chest  
   
Indication: Chest tube removal.  
   
COMPARISON: 10/10/2018 PA and lateral views demonstrate mild cardiomegaly in this patient status post  
median sternotomy. Lungs demonstrate bibasilar atelectasis medially in both lower lobes of moderate degree. This is unchanged. Small pleural effusions are  
noted. No pulmonary edema. No pneumothorax. 10/10/18 0512  XR CHEST PORT Final result Impression:  IMPRESSION:  
1. Improved aeration with decreasing basilar atelectasis Narrative:  EXAM:  XR CHEST PORT INDICATION:  Post op heart surgery, coronary artery disease, coronary artery  
bypass graft COMPARISON:  10/9/2018 FINDINGS: A portable AP radiograph of the chest was obtained at 0456 hours. The  
patient is on a cardiac monitor. The right IJ catheter overlies the SVC. [place] catheter has been removed. Mediastinal pleural drains remain in place. Overall the aeration has improved. Atelectasis at the lung bases left greater  
than right has slightly decreased. Small pleural effusions are present. No  
pneumothorax. No pulmonary edema. Lab Data Reviewed: Recent Labs 10/11/18 
 1118   10/11/18 
 0418   10/08/18 
 1450 WBC   --    --   10.4   < >  9.7 HGB   --    --   9.1*   < >  10.7* HCT   --    --   27.0*   < >  31.2*  
PLT   --    --   126*   < >  130* NA   --    --   136   < >  143 K   --    --   4.2   < >  4.1 BUN   --    --   18   < >  11  
CREA   --    --   0.81   < >  1.01  
GLU   --    --   136*   < >  129* GLUCPOC  141*   < >   --    < >   --   
INR   --    --    --    --   1.2*  
 < > = values in this interval not displayed. Last 24hr Input/Output: 
 
Intake/Output Summary (Last 24 hours) at 10/11/18 1242 Last data filed at 10/11/18 1003 Gross per 24 hour Intake           761.95 ml Output              975 ml Net          -213.05 ml Admission Weight: Last Weight Weight: 275 lb 9.2 oz (125 kg) Weight: 281 lb 12 oz (127.8 kg) EXAM:  General: 
 
  Chest: stable sternum Incisions: dry and intact Lungs:   Clear to auscultation bilaterally.   
   
Heart:  Regular rate and rhythm, S1, S2 normal, no murmur, no click, no rub  
  
Abdomen:   Soft, non-tender. Bowel sounds present. Extremities:  mild edema Neurologic:  Gross motor and sensory apparatus intact.   
 
 
Signed By: LÓPEZ Jimenez

## 2018-10-11 NOTE — PROGRESS NOTES
12: 04PM 
PT/OT recommending OP cardiac rehab. Referral sent to [facility] through [address] [place] [address] for nursing. CM will continue to follow and assist with d/c planning. TERE Conte Care Manager

## 2018-10-11 NOTE — PROGRESS NOTES
Problem: Patient Education: Go to Patient Education Activity Goal: Patient/Family Education Occupational Therapy Goals Initiated 10/9/2018 1. Patient will perform ADLs standing 5 mins without fatigue or LOB with supervision/set-up within 7 day(s). 2.  Patient will perform lower body ADLs with supervision/set-up within 7 day(s). 3.  Patient will perform gathering ADL items high and low 2/2 with supervision/set-up within 7 day(s). 4.  Patient will perform toilet transfers with supervision/set-up within 7 day(s). 5.  Patient will perform all aspects of toileting with supervision/set-up within 7 day(s). 6.  Patient will participate in cardiac/sternal upper extremity therapeutic exercise/activities to increase independence with ADLs with supervision/set-up for 5 minutes within 7 day(s). Occupational Therapy TREATMENT Patient: Trinidad Crenshaw (63 y.o. male) Date: 10/11/2018 Diagnosis: CAD 
CAD (coronary artery disease) CAD (coronary artery disease), native coronary artery <principal problem not specified> Procedure(s) (LRB): 
CORONARY ARTERY BYPASS GRAFT TIMES THREE  WITH RIGHT SAPHENOUS VEIN GRAFT ( ENDOSCOPIC HARVEST ) AND LEFT INTERNAL MAMMARY ARTERY WITH EXTRA CORPOREAL CIRCULATION. TRACI AND EPIAORTIC ULTRAOUND DONE BY DR SANDOVAL [facility]. (N/A) 3 Days Post-Op Precautions: Fall, Sternal 
Chart, occupational therapy assessment, plan of care, and goals were reviewed. ASSESSMENT: 
Patient received seated in chair with daughter present. Patient has been experiencing 7/10 incision site pain, RN reporting she had just provided medication and patient stating pain had improved. Patient participating in lower body dressing tasks and BLE exercises in order to obtain tailor sit; able to do so after few reps/leg. Patient continues to require increased time for all tasks and is limited by incision site pain and global deconditioning.  Discussed benefits of shower chair (if desired) and pacing for tolerating shower as well as car transfer techniques. Patient progressing well. Plans for d/c home tomorrow, recommend next OT session completing full body dressing. Progression toward goals: 
[]       Improving appropriately and progressing toward goals [x]       Improving slowly and progressing toward goals 
[]       Not making progress toward goals and plan of care will be adjusted PLAN: 
Patient continues to benefit from skilled intervention to address the above impairments. Continue treatment per established plan of care. Discharge Recommendations:  Outpatient Cardiac Rehab Further Equipment Recommendations for Discharge:  None noted SUBJECTIVE:  
Patient stated I'm feeling a bit better now that then pain medicine is working.  OBJECTIVE DATA SUMMARY:  
Cognitive/Behavioral Status: 
Neurologic State: Alert Orientation Level: Oriented X4 Cognition: Appropriate decision making; Appropriate for age attention/concentration; Appropriate safety awareness; Follows commands Perception: Appears intact Perseveration: No perseveration noted Safety/Judgement: Awareness of environment; Insight into deficits; Fall prevention Functional Mobility and Transfers for ADLs: 
Transfers: CGA for ambulation in hallway and standing for sternal exercises this morning with PT 
  
ADL Intervention: Lower Body Dressing Assistance Socks: Minimum assistance Leg Crossed Method Used: Yes Position Performed: Seated in chair Cognitive Retraining Safety/Judgement: Awareness of environment; Insight into deficits; Fall prevention Patient instructed no asymmetrical reaching over head to ensure B UEs when shoulders >90* i.e. reaching in cabinets and dressing. Instruction on upper body dressing techniques of over head, then arms through to decrease pain and unilateral shoulder flexion >90*. Instruction on the benefits of utilizing B UEs during functional tasks i.e. opening the fridge, stepping into the tub. Instruction if continued pain at home with shoulder IR for BM hygiene can use wet wipes and toilet tongs PRN. Avoid valsalva maneuvers. May have to adjust home setup to increase ease with items closer to waist height to prevent deep bending and the automatic  of asymmetrical UE WB/pushing for stabilization during bending. Benefit to don clothing tailor sitting and don all clothing while sitting prior to standing. Patient demonstrated lower body dressing with Min A . Instruction and indicated understanding on the benefits of loose clothing throughout to accommodate for post surgical swelling, decreased ROM and increased pain. Instruction and indicated understanding the technique of pull over shirt versus front open clothing. Increase activity tolerance for home, work, and sexual intercourse by pacing self with increasing the arm exercises, sitting duration, frequency OOB, walking, standing, and ADLs. Instructed and indicated understanding of s/s of too much activity, how to respond to s/s safely. Pain: 
Pain Scale 1: Numeric (0 - 10) Pain Intensity 1: 0 Pain Location 1: Back Pain Orientation 1: Posterior Pain Description 1: Aching Pain Intervention(s) 1: Medication (see MAR) Activity Tolerance:  
Fair. Limited by pain Please refer to the flowsheet for vital signs taken during this treatment. After treatment:  
[x] Patient left in no apparent distress sitting up in chair 
[] Patient left in no apparent distress in bed 
[x] Call bell left within reach [x] Nursing notified 
[x] Caregiver present 
[] Bed alarm activated COMMUNICATION/COLLABORATION:  
The patients plan of care was discussed with: Physical Therapist and Registered Nurse Kike Da Silva OT Time Calculation: 15 mins

## 2018-10-11 NOTE — PROGRESS NOTES
PCU SHIFT NURSING NOTE Bedside shift change report given to Alfredo Whyte RN and Andre Mace RN (oncoming nurse) by Radha Pulliam RN (offgoing nurse). Report included the following information SBAR, Kardex, Intake/Output, MAR, Recent Results and Cardiac Rhythm a fib. Shift Summary:  
1920: Pt just returned to bed after being up in chair this afternoon. Family at bedside. Pt mildly febrile on assessment, endorses \"some pain\", appears stoic. Reviewed plan of care and educated pt on importance of frequent IS use x 10 per hour while awake. (see other progress note) Dsg to midline incision c/d/i, pacer wires to external pacer c continued settings of V pacing, MA 10, rate 60. Pt in a fib HR 90s-115.  
1940: Received call from Dr. Cesar Garduno to review pt progress. Will continue to strongly encourage IS use and analgesia. Pt on 6L NC, will wean as tolerated. Update provided on current cardiac rhythm and rate. Orders received, read back, and entered for 150mg amio bolus and AM labs CBC, BMP, Mg.  
2058: Pt converted to NSR briefly, now back in a fib though HR well controlled 70s-80s. 2120: Converted to NSR. HR 75. 4L O2 SpO2 95%. 0030: Pt back in a fib, HR 90s. 0045: Assisted pt to bathroom; voided 500mL jorge urine. Slight dyspnea during exertion, SpO2 remained 92% on 4L. Pt used IS x 3, reached 600. Pt states pain is 4/10.  
0100: Administered 5mg oxycodone (see MAR, doc flowsheet) O2 at 3L now. SpO2 93. 
0208: Pt back in NSR c intermittent a fib.  
0500: Removed central line per policy. Catheter tip intact. Hemostasis achieved after 2 minutes of direct pressure. Transparent dsg applied. Pt instructed to remain supine and minimize activity for next 30 minutes. 0530: Pt c/o back pain 7/10. Adminsitered PRN analgesia. 0600: Pt up to recliner, teeth brushed, face washed. Pt diaphoretic this am. States pain is better.  Pacer wires remain to external pacemaker, dsg c/d/i. NSR, HR 70s. IS at bedside table. Reminded pt to use frequently. 0715: Bedside shift change report given to Orlin Zapata RN (oncoming nurse) by Bogdan Lees RN (offgoing nurse). Report included the following information SBAR, Kardex, Intake/Output, MAR, Recent Results and Cardiac Rhythm NSR. Admission Date 10/8/2018 Admission Diagnosis CAD 
CAD (coronary artery disease) CAD (coronary artery disease), native coronary artery Consults None Consults []PT []OT []Speech  
[]Case Management  
  
[] Palliative Cardiac Monitoring Order []Yes []No  
 
IV drips []Yes Drip:                            Dose: 
Drip:                            Dose: 
Drip:                            Dose:  
[]No  
 
GI Prophylaxis []Yes []No  
 
 
 
DVT Prophylaxis SCDs:  Sequential Compression Device: Bilateral  
     
 Scooter stockings:     
  
[] Medication []Contraindicated []None Activity Level Activity Level: Up with Assistance Activity Assistance: Partial (one person) Purposeful Rounding every 1-2 hour? []Yes  Score  Total Score: 3 Bed Alarm (If score 3 or >) []Yes  
[] Refused (See signed refusal form in chart) Paul Score  Paul Score: 19 Paul Score (if score 14 or less) []PMT consult  
[]Wound Care consult []Specialty bed  
[] Nutrition consult Needs prior to discharge:  
Home O2 required:   
[]Yes []No  
 If yes, how much O2 required? Other:  
 Last Bowel Movement: Last Bowel Movement Date: 10/08/18 Influenza Vaccine Received Flu Vaccine for Current Season (usually Sept-March): Yes Pneumonia Vaccine Diet Active Orders Diet DIET DIABETIC CONSISTENT CARB Regular; No Conc. Sweets LDAs Triple Lumen 10/08/18 Right Internal jugular (Active) Central Line Being Utilized Yes 10/10/2018  5:35 PM  
Criteria for Appropriate Use Limited/no vessel suitable for conventional peripheral access 10/10/2018  5:35 PM  
Site Assessment Clean, dry, & intact 10/10/2018  5:35 PM  
Infiltration Assessment 0 10/10/2018  5:35 PM  
Affected Extremity/Extremities Color distal to insertion site pink (or appropriate for race); Pulses palpable 10/10/2018  5:35 PM  
Date of Last Dressing Change 10/08/18 10/10/2018  5:35 PM  
Dressing Status Clean, dry, & intact 10/10/2018  5:35 PM  
Dressing Type Disk with Chlorhexadine gluconate (CHG); Transparent 10/10/2018  5:35 PM  
Action Taken Open ports on tubing capped 10/10/2018 12:00 PM  
Proximal Hub Color/Line Status White; Infusing 10/10/2018 12:00 PM  
Positive Blood Return (Medial Site) Yes 10/10/2018 12:00 PM  
Medial Hub Color/Line Status Blue; Infusing 10/10/2018 12:00 PM  
Positive Blood Return (Lateral Site) Yes 10/10/2018 12:00 PM  
Distal Hub Color/Line Status Brown; Infusing 10/10/2018 12:00 PM  
Positive Blood Return (Site #3) Yes 10/10/2018 12:00 PM  
Alcohol Cap Used Yes 10/10/2018 12:00 PM  
    
Peripheral IV 10/08/18 Left Hand (Active) Site Assessment Clean, dry, & intact 10/10/2018  5:35 PM  
Phlebitis Assessment 0 10/10/2018  5:35 PM  
Infiltration Assessment 0 10/10/2018  5:35 PM  
Dressing Status Clean, dry, & intact 10/10/2018  5:35 PM  
Dressing Type Tape;Transparent 10/10/2018  5:35 PM  
Hub Color/Line Status Pink;Capped 10/10/2018  5:35 PM  
                  
Urinary Catheter [REMOVED] Urinary Catheter 10/08/18 [facility] - Temperature-Indications for Use: Accurate measurement of urinary output Intake & Output Date 10/09/18 1900 - 10/10/18 8912 10/10/18 0700 - 10/11/18 5334 Shift 1900-0659 24 Hour Total 3129-6979 4490-8402 24 Hour Total  
I 
N 
T 
A 
K 
E 
 P.O. 100 100 250  250  
   P. O. 100 100 250  250 I.V. 
(mL/kg/hr) 342. 2 1612.4 255.6 
(0.2)  255.6 Precedex Volume  0 Cardene Volume  0 Diprivan Volume  0 Insulin Volume 38.3 82.3 25.6  25.6    DOBUTamine Volume  0     
 Phenylephrine Volume  60 Amiodarone Volume 6 54.2 Volume (0.45% sodium chloride infusion) 120 240 100  100 Volume (0.9% sodium chloride infusion) 108 216 90  90 Volume (potassium chloride 20 mEq in 50 ml IVPB) 50 50 Volume (acetaminophen (OFIRMEV) infusion 1,000 mg)  100 Volume (albumin human 5% (BUMINATE) solution 12.5 g)  750 Volume (calcium chloride 1 g in 0.9% sodium chloride 250 mL IVPB)  0 Volume (ceFAZolin (ANCEF) 2 g/20 mL in sterile water IV syringe) 20 60 40  40 Shift Total 
(mL/kg) 442.2 
(3.4) 1712.4 
(13.2) 505.6 
(3.9)  505.6 
(3.9) O 
U T 
P 
U Jeremy Isabell Urine (mL/kg/hr) 652 1062 270 
(0.2)  270 Urine Voided   175  175 Urine Output (mL) ([REMOVED] Urinary Catheter 10/08/18 [facility] - Temperature) 652 1062 95  95 Drains 130 510 60  60 Output (ml) ([REMOVED] Saeed Drain #1 10/08/18 Mid Chest) 130 510 60  60 Shift Total 
(mL/kg) 782 
(6) 1572 
(12.1) 330 
(2.5)  330 
(2.5) NET -339.8 140.4 175.6  175.6 Weight (kg) 130.1 130.1 130.1 130.1 130.1 Readmission Risk Assessment Tool Score Low Risk   
      
 7 Total Score 3 Has Seen PCP in Last 6 Months (Yes=3, No=0) 2 . Living with Significant Other. Assisted Living. LTAC. SNF. or  
Rehab  
 2 Charlson Comorbidity Score (Age + Comorbid Conditions) Criteria that do not apply:  
 Patient Length of Stay (>5 days = 3) IP Visits Last 12 Months (1-3=4, 4=9, >4=11) Pt. Coverage (Medicare=5 , Medicaid, or Self-Pay=4) Expected Length of Stay 6d 0h  
Actual Length of Stay 2

## 2018-10-11 NOTE — CARDIO/PULMONARY
Cardiopulmonary Rehab:  Cardiac Rehab consult acknowledged 
  
Chart reviewed. Pt is a 63 y.o. male admitted S/P CABG 10/8/18.  
  
PMH includes CAD, gout, DM, MI, cardiac stents. Light tobacco smoker DIET DIABETIC CONSISTENT CARB Regular; No Conc. Sweets Pt has already been scheduled for OP Cardiac rehab. CABG education folder at bedside. Educated using teach back method. Reviewed the use of bear for sternal support, daily weight and temperature monitoring, showering restrictions, signs and symptoms of infection at surgery sites, daily walking and arm exercises, and use of incentive spirometer. Angelina Casper Sr was able to demonstrate  use of incentive spirometer, however reminded to increase effort and repetitons hourly- achieving 500 ml. Smoking Cessation Program link added to AVS. Discussed Heart Healthy/Low Sodium (2000 mg.) diet. Reminded of sternal precautions,no driving until cleared by surgeon,importance of staying ahead of pain and nausea. Will continue to follow for educational needs.

## 2018-10-11 NOTE — PROGRESS NOTES
PCU SHIFT NURSING NOTE Bedside and Verbal shift change report given to Janel Asencio (oncoming nurse) by Nereyda Euceda (offgoing nurse). Report included the following information SBAR, Kardex, Procedure Summary, Intake/Output, MAR and Recent Results. Shift Summary: 4188 
 
0858: Patient off the unit for PA and lateral chest xray 
0800:Patient back from procedure 1863: Patient up walking in hallway with physical therapy, tolerated well. Patient on 2 liters nasal cannula 1045: Sternotomy dressing removed, pacer wires capped and taped. Oxygen removed, now on room air. Tolerating ok Admission Date 10/8/2018 Admission Diagnosis CAD 
CAD (coronary artery disease) CAD (coronary artery disease), native coronary artery Consults None Consults [x]PT [x]OT []Speech  
[]Case Management  
  
[] Palliative Cardiac Monitoring Order  
[x]Yes []No  
 
IV drips []Yes Drip:                            Dose: 
Drip:                            Dose: 
Drip:                            Dose:  
[]No  
 
GI Prophylaxis []Yes []No  
 
 
 
DVT Prophylaxis SCDs:  Sequential Compression Device: Bilateral  
     
 Scooter stockings:     
  
[x] Medication []Contraindicated []None Activity Level Activity Level: Up with Assistance, [facility] Room Privileges Activity Assistance: Partial (one person) Purposeful Rounding every 1-2 hour? []Yes  Score  Total Score: 3 Bed Alarm (If score 3 or >) []Yes  
[] Refused (See signed refusal form in chart) Paul Score  Paul Score: 19 Paul Score (if score 14 or less) []PMT consult  
[]Wound Care consult []Specialty bed  
[] Nutrition consult Needs prior to discharge:  
Home O2 required:   
[]Yes []No  
 If yes, how much O2 required? Other:  
 Last Bowel Movement: Last Bowel Movement Date: 10/08/18 Influenza Vaccine Received Flu Vaccine for Current Season (usually Sept-March): Yes Pneumonia Vaccine Diet Active Orders Diet DIET DIABETIC CONSISTENT CARB Regular; No Conc. Sweets LDAs Peripheral IV 10/08/18 Left Hand (Active) Site Assessment Clean, dry, & intact 10/11/2018  4:23 AM  
Phlebitis Assessment 0 10/11/2018  4:23 AM  
Infiltration Assessment 0 10/11/2018  4:23 AM  
Dressing Status Clean, dry, & intact 10/11/2018  4:23 AM  
Dressing Type Transparent 10/11/2018  4:23 AM  
Hub Color/Line Status Pink;Patent; Flushed 10/11/2018  4:23 AM  
                  
Urinary Catheter [REMOVED] Urinary Catheter 10/08/18 [facility] - Temperature-Indications for Use: Accurate measurement of urinary output Intake & Output Date 10/10/18 0700 - 10/11/18 9094 10/11/18 0700 - 10/12/18 8296 Shift 3042-2728 6356-9208 24 Hour Total 6182-5366 7553-8733 24 Hour Total  
I 
N 
T 
A 
K 
E 
 P.O. 250 360 610     
   P. O. 250 360 610 I.V. 
(mL/kg/hr) 255.6 
(0.2) 100 
(0.1) 355.6 
(0.1) Insulin Volume 25.6  25.6 Amiodarone Volume  100 100 Volume (0.45% sodium chloride infusion) 100  100 Volume (0.9% sodium chloride infusion) 90  90 Volume (ceFAZolin (ANCEF) 2 g/20 mL in sterile water IV syringe) 40  40 Shift Total 
(mL/kg) 505.6 
(3.9) 460 
(3.6) 965.6 
(7.6) O 
U T 
P 
U Junior Pettit Urine (mL/kg/hr) 270 
(0.2) 500 
(0.3) 770 
(0.3) Urine Voided 175 500 675 Urine Occurrence(s)  1 x 1 x Urine Output (mL) ([REMOVED] Urinary Catheter 10/08/18 [facility] - Temperature) 95  95 Drains 60  60 Output (ml) ([REMOVED] Saeed Drain #1 10/08/18 Mid Chest) 60  60 Shift Total 
(mL/kg) 330 
(2.5) 500 
(3.9) 830 
(6.5) .6 -40 135.6 Weight (kg) 130.1 127.8 127.8 127.8 127.8 127.8 Readmission Risk Assessment Tool Score Low Risk   
      
 7 Total Score 3 Has Seen PCP in Last 6 Months (Yes=3, No=0) 2 . Living with Significant Other. Assisted Living. LTAC. SNF. or  
Rehab 2 Charlson Comorbidity Score (Age + Comorbid Conditions) Criteria that do not apply:  
 Patient Length of Stay (>5 days = 3) IP Visits Last 12 Months (1-3=4, 4=9, >4=11) Pt. Coverage (Medicare=5 , Medicaid, or Self-Pay=4) Expected Length of Stay 6d 0h  
Actual Length of Stay 3

## 2018-10-11 NOTE — PROGRESS NOTES
After Visit Summary   3/1/2017    Teri Garcia    MRN: [id number]           Patient Information     Date Of Birth          1968        Visit Information        Provider Department      3/1/2017 11:45 AM Michelle Ruff MD [facility]        Today's Diagnoses     Chronic pain syndrome        Chronic tension-type headache, not intractable        Anxiety        Muscle spasms of neck           Follow-ups after your visit        Your next 10 appointments already scheduled     Apr 28, 2017 10:30 AM CDT   Office Visit with Michelle Ruff MD   [facility] ([facility]    [address] [place] [id number]   [phone]           Bring a current list of meds and any records pertaining to this visit.  For Physicals, please bring immunization records and any forms needing to be filled out.  Please arrive 10 minutes early to complete paperwork.              Who to contact     If you have questions or need follow up information about today's clinic visit or your schedule please contact [facility] directly at [phone].  Normal or non-critical lab and imaging results will be communicated to you by [email or web address], letter or phone within 4 business days after the clinic has received the results. If you do not hear from us within 7 days, please contact the clinic through [employer] or phone. If you have a critical or abnormal lab result, we will notify you by phone as soon as possible.  Submit refill requests through [employer] or call your pharmacy and they will forward the refill request to us. Please allow 3 business days for your refill to be completed.          Additional Information About Your Visit        MyChart Information     [employer] gives you secure access to your electronic health record. If you see a primary care provider, you can also send messages to your care team and make appointments. If you have  Spiritual Care Partner Volunteer visited patient in PCU unit on October 11, 2016. Documented by: 
KARI Mccray, [facility],  [facility]  Paging Service  [phone]) questions, please call your primary care clinic.  If you do not have a primary care provider, please call [phone] and they will assist you.        Care EveryWhere ID     This is your Care EveryWhere ID. This could be used by other organizations to access your [facility] medical records  [id number]         Blood Pressure from Last 3 Encounters:   09/14/16 98/68   08/12/16 114/74   08/04/16 110/74    Weight from Last 3 Encounters:   09/14/16 162 lb (73.5 kg)   08/12/16 169 lb 1.6 oz (76.7 kg)   08/04/16 163 lb (73.9 kg)              Today, you had the following     No orders found for display         Where to get your medicines      These medications were sent to [place] [address] [place] [facility] [address] [facility]  [address], [place] [address]     Phone:  [phone]     cyclobenzaprine 10 MG tablet         Some of these will need a paper prescription and others can be bought over the counter.  Ask your nurse if you have questions.     Bring a paper prescription for each of these medications     ALPRAZolam 2 MG tablet    HYDROcodone-acetaminophen  MG per tablet          Primary Care Provider Office Phone # Fax #    Michelle Ruff [phone]       [facility] [address] [place] [address]        Thank you!     Thank you for choosing [facility]  for your care. Our goal is always to provide you with excellent care. Hearing back from our patients is one way we can continue to improve our services. Please take a few minutes to complete the written survey that you may receive in the mail after your visit with us. Thank you!             Your Updated Medication List - Protect others around you: Learn how to safely use, store and throw away your medicines at www.disposemymeds.org.          This list is accurate as of: 3/1/17  1:15 PM.  Always use your most recent med list.                   Brand Name Dispense Instructions for use     albuterol 108 (90 BASE) MCG/ACT Inhaler   Generic drug:  albuterol     8.5 Inhaler    INHALE 2 PUFFS INTO THE LUNGS EVERY 4 HOURS AS NEEDED FOR SHORTNESS OF BREATH / DYSPNEA OR WHEEZING       ALPRAZolam 2 MG tablet   Start taking on:  3/16/2017    XANAX    34 tablet    Take 1 tablet (2 mg) by mouth 2 times daily as needed for sleep       aspirin 325 MG tablet      Take 1 tablet (325 mg) by mouth 2 times daily as needed for moderate pain       butalbital-acetaminophen-caffeine -40 MG per tablet    FIORICET/ESGIC    60 tablet    Take 2 tablets by mouth daily       cyclobenzaprine 10 MG tablet   Start taking on:  3/16/2017    FLEXERIL    60 tablet    Take 1 tablet (10 mg) by mouth 2 times daily as needed for muscle spasms       EXCEDRIN MIGRAINE 250-250-65 MG per tablet   Generic drug:  aspirin-acetaminophen-caffeine      Take 4 tablets by mouth as needed.       HYDROcodone-acetaminophen  MG per tablet   Start taking on:  3/16/2017    NORCO    128 tablet    Take 2 tablets by mouth 3 times daily as needed for moderate to severe pain       ondansetron 4 MG tablet    ZOFRAN    15 tablet    Take 1 tablet (4 mg) by mouth every 6 hours as needed for nausea       PROTONIX 40 MG EC tablet   Generic drug:  pantoprazole     90 tablet    Take 40 mg by mouth daily Reported on 3/1/2017       SUMAtriptan 100 MG tablet    IMITREX    9 tablet    Take 1 tablet (100 mg) by mouth at onset of headache for migraine       TUMS ULTRA 1000 1000 MG Chew   Generic drug:  calcium carbonate antacid      Take 1-2 tablets by mouth as needed. May increase.       WOMENS MULTI VITAMIN & MINERAL PO

## 2018-10-11 NOTE — PROGRESS NOTES
1100: Receive report from Tim Beavers 13. No complaints of pain, nausea, or shortness of breath. Worked with  OT. 
1400: Back to bed. Sats 88-89% placed back on 2L NC 
1515: Requesting fan temp 100.3. Given po Tylenol. Instructed to use IS 5-8 times every hour. 1700: Temp down to 98.3 up in chair. No complaints of pain at this time. Up to cahir for dinner

## 2018-10-11 NOTE — PROGRESS NOTES
10/10/18 1921 Vitals Temp 100.1 °F (37.8 °C) Educated pt on need for IS use. Pain described at [facility], 4/10\" at site of incision. Pt c shallow respirations, lungs diminished. Pt performed IS x 5 and achieved 750. Will continue to strongly encourage use and administer PRN analgesia. 2000: Administered oxycodone 5mg (see doc flowsheet, MAR) 2021: Temp now 99.3. Pt reports pain is improved, 2/10. Placed pt in high fowlers and instructed to cough, deep breath and use IS. Cough is somewhat weak. Encouraged pt to splint for comfort. Pt able to achieve 900 once during 8 attempts. 0419: Temp 100 F. Instructed pt to cough, deep breath, and use IS; best effort achieved was 800 after 6 times. 0448: Temp 98.3 F.

## 2018-10-12 VITALS
HEART RATE: 73 BPM | WEIGHT: 275.8 LBS | TEMPERATURE: 98.7 F | HEIGHT: 73 IN | OXYGEN SATURATION: 94 % | SYSTOLIC BLOOD PRESSURE: 137 MMHG | RESPIRATION RATE: 18 BRPM | DIASTOLIC BLOOD PRESSURE: 81 MMHG | BODY MASS INDEX: 36.55 KG/M2

## 2018-10-12 DIAGNOSIS — E11.9 DIABETES MELLITUS WITHOUT COMPLICATION (HCC): Primary | ICD-10-CM

## 2018-10-12 PROBLEM — Z95.1 S/P CABG X 4: Status: ACTIVE | Noted: 2018-10-08

## 2018-10-12 LAB
ABO + RH BLD: NORMAL
BLD PROD TYP BPU: NORMAL
BLD PROD TYP BPU: NORMAL
BLOOD GROUP ANTIBODIES SERPL: NORMAL
BPU ID: NORMAL
BPU ID: NORMAL
CROSSMATCH RESULT,%XM: NORMAL
CROSSMATCH RESULT,%XM: NORMAL
GLUCOSE BLD STRIP.AUTO-MCNC: 135 MG/DL (ref 65–100)
SERVICE CMNT-IMP: ABNORMAL
SPECIMEN EXP DATE BLD: NORMAL
STATUS OF UNIT,%ST: NORMAL
STATUS OF UNIT,%ST: NORMAL
UNIT DIVISION, %UDIV: 0
UNIT DIVISION, %UDIV: 0

## 2018-10-12 PROCEDURE — 74011250636 HC RX REV CODE- 250/636: Performed by: PHYSICIAN ASSISTANT

## 2018-10-12 PROCEDURE — 77030037878 HC DRSG MEPILEX >48IN BORD MOLN -B

## 2018-10-12 PROCEDURE — 97535 SELF CARE MNGMENT TRAINING: CPT

## 2018-10-12 PROCEDURE — 97116 GAIT TRAINING THERAPY: CPT

## 2018-10-12 PROCEDURE — 82962 GLUCOSE BLOOD TEST: CPT

## 2018-10-12 PROCEDURE — 74011250637 HC RX REV CODE- 250/637: Performed by: PHYSICIAN ASSISTANT

## 2018-10-12 RX ORDER — METOPROLOL TARTRATE 25 MG/1
25 TABLET, FILM COATED ORAL EVERY 12 HOURS
Qty: 60 TAB | Refills: 1 | Status: SHIPPED | OUTPATIENT
Start: 2018-10-12 | End: 2018-12-11

## 2018-10-12 RX ORDER — BUMETANIDE 2 MG/1
2 TABLET ORAL DAILY
Qty: 30 TAB | Refills: 0 | Status: SHIPPED | OUTPATIENT
Start: 2018-10-12 | End: 2018-11-11

## 2018-10-12 RX ORDER — AMLODIPINE BESYLATE 5 MG/1
5 TABLET ORAL DAILY
Qty: 30 TAB | Refills: 5 | Status: SHIPPED | OUTPATIENT
Start: 2018-10-12 | End: 2019-04-10

## 2018-10-12 RX ORDER — POTASSIUM CHLORIDE 1500 MG/1
20 TABLET, FILM COATED, EXTENDED RELEASE ORAL DAILY
Qty: 30 TAB | Refills: 0 | Status: SHIPPED | OUTPATIENT
Start: 2018-10-12 | End: 2018-11-11

## 2018-10-12 RX ORDER — AMIODARONE HYDROCHLORIDE 400 MG/1
400 TABLET ORAL DAILY
Qty: 30 TAB | Refills: 0 | Status: SHIPPED | OUTPATIENT
Start: 2018-10-12 | End: 2018-11-11

## 2018-10-12 RX ORDER — BUMETANIDE 1 MG/1
2 TABLET ORAL DAILY
Status: DISCONTINUED | OUTPATIENT
Start: 2018-10-12 | End: 2018-10-12 | Stop reason: HOSPADM

## 2018-10-12 RX ORDER — VITAMIN E 1000 UNIT
1000 CAPSULE ORAL DAILY
Qty: 30 TAB | Refills: 5 | Status: SHIPPED | OUTPATIENT
Start: 2018-10-12 | End: 2019-04-10

## 2018-10-12 RX ORDER — OXYCODONE HYDROCHLORIDE 5 MG/1
5 TABLET ORAL
Qty: 40 TAB | Refills: 0 | Status: SHIPPED | OUTPATIENT
Start: 2018-10-12 | End: 2019-01-04

## 2018-10-12 RX ORDER — METOPROLOL TARTRATE 25 MG/1
25 TABLET, FILM COATED ORAL EVERY 12 HOURS
Status: DISCONTINUED | OUTPATIENT
Start: 2018-10-12 | End: 2018-10-12 | Stop reason: HOSPADM

## 2018-10-12 RX ORDER — METFORMIN HYDROCHLORIDE 1000 MG/1
1000 TABLET ORAL 2 TIMES DAILY WITH MEALS
Qty: 60 TAB | Refills: 1 | Status: SHIPPED | OUTPATIENT
Start: 2018-10-12 | End: 2018-12-11

## 2018-10-12 RX ADMIN — BUMETANIDE 2 MG: 1 TABLET ORAL at 09:16

## 2018-10-12 RX ADMIN — METFORMIN HYDROCHLORIDE 1000 MG: 500 TABLET ORAL at 09:15

## 2018-10-12 RX ADMIN — OXYCODONE HYDROCHLORIDE AND ACETAMINOPHEN 1000 MG: 500 TABLET ORAL at 09:14

## 2018-10-12 RX ADMIN — AMIODARONE HYDROCHLORIDE 400 MG: 200 TABLET ORAL at 09:15

## 2018-10-12 RX ADMIN — Medication 400 MG: at 09:15

## 2018-10-12 RX ADMIN — ASPIRIN 81 MG CHEWABLE TABLET 81 MG: 81 TABLET CHEWABLE at 09:15

## 2018-10-12 RX ADMIN — OXYCODONE HYDROCHLORIDE 5 MG: 5 TABLET ORAL at 10:57

## 2018-10-12 RX ADMIN — LINAGLIPTIN 5 MG: 5 TABLET, FILM COATED ORAL at 09:14

## 2018-10-12 RX ADMIN — ENOXAPARIN SODIUM 40 MG: 40 INJECTION, SOLUTION INTRAVENOUS; SUBCUTANEOUS at 00:19

## 2018-10-12 RX ADMIN — ALLOPURINOL 300 MG: 100 TABLET ORAL at 09:14

## 2018-10-12 RX ADMIN — POTASSIUM CHLORIDE 20 MEQ: 750 TABLET, FILM COATED, EXTENDED RELEASE ORAL at 09:14

## 2018-10-12 RX ADMIN — ATORVASTATIN CALCIUM 20 MG: 20 TABLET, FILM COATED ORAL at 09:15

## 2018-10-12 RX ADMIN — Medication 10 ML: at 06:10

## 2018-10-12 NOTE — DISCHARGE SUMMARY
[id number] [facility] Discharge Summary     Patient ID:  Alycia Michael Sr  [id number]  6 y.o.  1960    POD: 4 Days Post-Op      Admit date: 10/8/2018    Discharge date: 10/12/2018     Discharge to: HOME    Admitting Physician: Nena Brannon MD     Referring Cardiologist:  Guillermina Jeronimo    PCP:  Mery Calloway MD    Active Problems:    CAD (Coronary Artery Disease) - stent 1999 (10/20/2009)      CAD (coronary artery disease), native coronary artery (10/8/2018)      S/P CABG x 4 (10/8/2018)         Procedure(s):  CORONARY ARTERY BYPASS GRAFT TIMES THREE  WITH RIGHT SAPHENOUS VEIN GRAFT ( ENDOSCOPIC HARVEST ) AND LEFT INTERNAL MAMMARY ARTERY WITH EXTRA CORPOREAL CIRCULATION. TRACI AND EPIAORTIC ULTRAOUND DONE BY DR SANDOVAL [facility]. Discharge Medications:   Current Discharge Medication List      START taking these medications    Details   metoprolol tartrate (LOPRESSOR) 25 mg tablet Take 1 Tab by mouth every twelve (12) hours for 60 days. Qty: 60 Tab, Refills: 1      oxyCODONE IR (ROXICODONE) 5 mg immediate release tablet Take 1 Tab by mouth every eight (8) hours as needed. Max Daily Amount: 15 mg.  Qty: 40 Tab, Refills: 0    Associated Diagnoses: S/P CABG x 4      amiodarone (PACERONE) 400 mg tablet Take 1 Tab by mouth daily for 30 days. Qty: 30 Tab, Refills: 0      potassium chloride SR (K-TAB) 20 mEq tablet Take 1 Tab by mouth daily for 30 days. Qty: 30 Tab, Refills: 0         CONTINUE these medications which have CHANGED    Details   amLODIPine (NORVASC) 5 mg tablet Take 1 Tab by mouth daily for 180 days. Qty: 30 Tab, Refills: 5      ascorbic acid, vitamin C, (VITAMIN C) 1,000 mg tablet Take 1 Tab by mouth daily for 180 days. Qty: 30 Tab, Refills: 5    Associated Diagnoses: Coronary artery disease of native artery of native heart with stable angina pectoris (HCC)      bumetanide (BUMEX) 2 mg tablet Take 1 Tab by mouth daily for 30 days.   Qty: 30 Tab, Refills: 0      metFORMIN (GLUCOPHAGE) 1,000 mg tablet Take 1 Tab by mouth two (2) times daily (with meals) for 60 days. Qty: 60 Tab, Refills: 1         CONTINUE these medications which have NOT CHANGED    Details   sAXagliptin (ONGLYZA) 5 mg tab tablet Take 1 Tab by mouth daily. Qty: 90 Tab, Refills: 3      omeprazole (PRILOSEC) 20 mg capsule Take 1 Cap by mouth daily. Qty: 90 Cap, Refills: 3      allopurinol (ZYLOPRIM) 300 mg tablet TAKE 1 TABLET BY MOUTH EVERY DAY  Qty: 90 Tab, Refills: 3      multivitamin (ONE A DAY) tablet Take 1 Tab by mouth daily. atorvastatin (LIPITOR) 80 mg tablet Take 80 mg by mouth daily. aspirin 81 mg Tab Take 81 mg by mouth daily. glucose blood VI test strips (ONETOUCH VERIO) strip Twice daily  Qty: 100 Strip, Refills: 5      lancets (ONETOUCH DELICA LANCETS) 30 gauge misc Twice daily  Qty: 100 Lancet, Refills: 5         STOP taking these medications       chlorhexidine (PERIDEX) 0.12 % solution Comments:   Reason for Stopping:         losartan (COZAAR) 50 mg tablet Comments:   Reason for Stopping:         atenolol (TENORMIN) 100 mg tablet Comments:   Reason for Stopping:         mupirocin (BACTROBAN) 2 % ointment Comments:   Reason for Stopping:             Hospital Course:      Chief Complaint: chest pain and BOURNEARBEN Velazquez Sr is a 63 y. o. hypertensive, uncontrolled type 2 diabetic with peripheral neuropathy, cigarette smoking, s/p PCI to OM at age 29, cau male who was referred for opinion and advise regarding coronary revascularization  by Dr. Reynold Hardy MD.       One month history of exertional chest pain with left arm radiation associated dyspnea relieved by rest/ time. No resting symptoms. Ankle edema noticed relieved while supine overnight. Denies nausea, vomiting, claudication, palpitations, PND or syncope.       Cardiac Testing:       Nuclear: inferior ishemia      Echocardiogram   Left ventricle: Systolic function was normal. Ejection fraction was  estimated in the range of 55 % to 60 %.  There were no regional wall motion  abnormalities. There was mild concentric hypertrophy. Right ventricle: The size was at the upper limits of normal.    INDICATIONS: Pre Op Heart surgery. PROCEDURE: This was an urgent study. The study included complete 2D  imaging, M-mode, complete spectral Doppler, and color Doppler. Image  quality was adequate. LEFT VENTRICLE: Size was normal. Systolic function was normal. Ejection  fraction was estimated in the range of 55 % to 60 %. There were no  regional wall motion abnormalities. There was mild concentric hypertrophy. RIGHT VENTRICLE: The size was at the upper limits of normal. Systolic  function was normal.    LEFT ATRIUM: Size was normal.    RIGHT ATRIUM: Size was normal.    MITRAL VALVE: Normal valve structure. There was normal leaflet separation. DOPPLER: The transmitral velocity was within the normal range. There was  no evidence for stenosis. There was trivial regurgitation. AORTIC VALVE: The valve was trileaflet. Leaflets exhibited normal  thickness and normal cuspal separation. DOPPLER: Transaortic velocity was  within the normal range. There was no stenosis. There was no regurgitation. TRICUSPID VALVE: Normal valve structure. There was normal leaflet  separation. DOPPLER: The transtricuspid velocity was within the normal  range. There was no evidence for tricuspid stenosis. There was trivial  regurgitation. PULMONIC VALVE: Not well visualized, but normal Doppler findings. DOPPLER:  There was no regurgitation. AORTA: The root exhibited normal size.      Cardiac catheretization films were personally reviewed  LM 60-70%  Patent OM stent  Multiple tight RCA blockages  EF ok  EDP 18    CORONARY ARTERY BYPASS GRAFT TIMES THREE  WITH RIGHT SAPHENOUS VEIN GRAFT ( ENDOSCOPIC HARVEST ) AND LEFT INTERNAL MAMMARY ARTERY WITH EXTRA CORPOREAL CIRCULATION. TRACI AND EPIAORTIC ULTRAOUND DONE BY DR SANDOVAL [facility].        POD#1Stable hemodynamics in sinus rhythm without ectopy on Tyrell 20  Inadequate pain control  OOB to chair  WBC 15 HH 10/31    CXR: bibasilar atel   300/12 hrs  Cr 0.9  UOP 1600     POD#2Stable hemodynamics in sinus rhythm without ectopy on no support. WBC 11 HH 9/27    130/12 hrs  CXR poor inspiration  Cr 0.9 UOP 1000 +215     POD#3Stable hemodynamics in sinus rhythm without ectopy on no support. OOB to chair, working w PT/OT  WBC 10 HH 9/27   CXR: Moderate bibasilar atelectasis with small pleural effusions. CR 0.81  /24 hrs +120  POD#4   Stable hemodynamics in sinus rhythm without ectopy on no support. Ambulating, self care  Incisions quite  Needs to breath deeper  Wants to go home  Increase Bumex to 2 mg daily  Increase BB  Epicardial wires pulled  Home today      Labs:   Recent Labs      10/11/18   0418   WBC  10.4   HGB  9.1*   HCT  27.0*   CREA  0.81       CXR Results  (Last 48 hours)               10/11/18 0749  XR CHEST PA LAT Final result    Impression:  IMPRESSION:   1. Moderate bibasilar atelectasis with small pleural effusions. Narrative:  Exam:  2 view chest       Indication: Chest tube removal.       COMPARISON: 10/10/2018       PA and lateral views demonstrate mild cardiomegaly in this patient status post   median sternotomy. Lungs demonstrate bibasilar atelectasis medially in both   lower lobes of moderate degree. This is unchanged. Small pleural effusions are   noted. No pulmonary edema. No pneumothorax. Patient Instructions: See Discharge Instructions  Patient was given a separate list of discharge instructions and prescriptions. At this time, all questions were answered and discharge medications were reviewed. The patient has a follow up appointment in one week.       Signed:  LÓPEZ Valenzuela  10/12/2018  10:10 AM

## 2018-10-12 NOTE — PROGRESS NOTES
Occupational Therapy TREATMENT Patient: Alex Maxwell (63 y.o. male) Date: 10/12/2018 Diagnosis: CAD 
CAD (coronary artery disease) CAD (coronary artery disease), native coronary artery <principal problem not specified> Procedure(s) (LRB): 
CORONARY ARTERY BYPASS GRAFT TIMES THREE  WITH RIGHT SAPHENOUS VEIN GRAFT ( ENDOSCOPIC HARVEST ) AND LEFT INTERNAL MAMMARY ARTERY WITH EXTRA CORPOREAL CIRCULATION. TRACI AND EPIAORTIC ULTRAOUND DONE BY DR SANDOVAL [facility]. (N/A) 4 Days Post-Op Precautions: Fall, Sternal 
Chart, occupational therapy assessment, plan of care, and goals were reviewed. ASSESSMENT: 
Nursing cleared for therapy with plans for dc once cleared on stairs. Received dressed in chair. Denies any difficulties with dressing or concerns with sternal precautions. Ed on donning button up shirt or jacket techniques. Reports pain is better and he has not needed pain medication this AM.  Provided additional education on toileting to avoid straining, turning UB prior to reaching for BM hygiene and safety with standing at sink for grooming. Completed toileting at supervision level. Issued hand out for sternal precautions with ADL tasks, plans to have caregiver assist during the day. Cleared for dc from OT standpoint with support at home. Progression toward goals: 
[x]       Improving appropriately and progressing toward goals 
[]       Improving slowly and progressing toward goals 
[]       Not making progress toward goals and plan of care will be adjusted PLAN: 
Patient continues to benefit from skilled intervention to address the above impairments. Continue treatment per established plan of care. Discharge Recommendations:  Outpatient Further Equipment Recommendations for Discharge:  None for OT SUBJECTIVE:  
Patient stated I am ready to go home.  The patient stated 3/3 sternal precautions. Reviewed all 3 with patient. OBJECTIVE DATA SUMMARY:  
Cognitive/Behavioral Status: Neurologic State: Alert Orientation Level: Oriented X4 Cognition: Appropriate decision making; Appropriate for age attention/concentration; Appropriate safety awareness Functional Mobility and Transfers for ADLs: 
Bed Mobility: 
  
 
Transfers: 
Sit to Stand: Supervision Functional Transfers Toilet Transfer : Supervision Balance: 
Sitting: Intact Standing: Intact Standing - Static: Good Standing - Dynamic : Good ADL Intervention: 
  
 
Grooming Washing Hands: Supervision/set-up Toileting Toileting Assistance: Supervision/set up Bladder Hygiene: Supervision/set-up Clothing Management: Supervision/set-up Patient instructed no asymmetrical reaching over head to ensure B UEs when shoulders >90* i.e. reaching in cabinets and dressing. Instruction on upper body dressing techniques of over head, then arms through to decrease pain and unilateral shoulder flexion >90*. Instruction on the benefits of utilizing B UEs during functional tasks i.e. opening the fridge, stepping into the tub. Instruction if continued pain at home with shoulder IR for BM hygiene can use wet wipes and toilet tongs PRN. Avoid valsalva maneuvers. May have to adjust home setup to increase ease with items closer to waist height to prevent deep bending and the automatic  of asymmetrical UE WB/pushing for stabilization during bending. Benefit to don clothing tailor sitting and don all clothing while sitting prior to standing. Increase activity tolerance for home, work, and sexual intercourse by pacing self with increasing the arm exercises, sitting duration, frequency OOB, walking, standing, and ADLs. Instructed and indicated understanding of s/s of too much activity, how to respond to s/s safely. Pain: 
Pain Scale 1: Numeric (0 - 10) Pain Intensity 1: 0 Activity Tolerance:  
Fair for toileting. After treatment:  
[x] Patient left in no apparent distress sitting up in chair [] Patient left in no apparent distress in bed 
[x] Call bell left within reach [x] Nursing notified 
[x] Caregiver present 
[] Bed alarm activated COMMUNICATION/COLLABORATION:  
The patients plan of care was discussed with: Physical Therapist and Registered Nurse Efren Lomas OT Time Calculation: 10 mins

## 2018-10-12 NOTE — PROGRESS NOTES
PCU SHIFT NURSING NOTE Bedside shift change report given to Spring Elizabeth RN (oncoming nurse) by Taryn Olivares RN (offgoing nurse). Report included the following information SBAR, Kardex, Intake/Output, MAR, Recent Results and Cardiac Rhythm a fib. Shift Summary:  
2000: Pt resting in bed, no complaints, using IS x 5 achieving 900. Midline incision open to air, clean and well approximated. Dsg to R neck c/d/i, R leg incision open to air, well approximated. 2020: Pt sitting on side of bed after using IS, assisted back into bed, now c/o incisional chest pain 5/10. Administered 5mg oxycodone. (see MAR, doc flowsheet) 0530: Assisted pt to recliner. Pacer wire care completed and CHG wipe to sternal incision. Pt denies pain and says he feels better this AM. 0720: Bedside shift change report given to Nguyen BAPTISTE RN (oncoming nurse) by Spring Elizabeth RN (offgoing nurse). Report included the following information SBAR, Kardex, Intake/Output, MAR, Recent Results and Cardiac Rhythm NSR. Admission Date 10/8/2018 Admission Diagnosis CAD 
CAD (coronary artery disease) CAD (coronary artery disease), native coronary artery Consults None Consults []PT []OT []Speech  
[]Case Management  
  
[] Palliative Cardiac Monitoring Order []Yes []No  
 
IV drips []Yes Drip:                            Dose: 
Drip:                            Dose: 
Drip:                            Dose:  
[]No  
 
GI Prophylaxis []Yes []No  
 
 
 
DVT Prophylaxis SCDs:  Sequential Compression Device: Bilateral  
     
 Scooter stockings:     
  
[] Medication []Contraindicated []None Activity Level Activity Level: Up with Assistance Activity Assistance: Partial (one person) Purposeful Rounding every 1-2 hour? []Yes  Score  Total Score: 3 Bed Alarm (If score 3 or >) []Yes  
[] Refused (See signed refusal form in chart) Paul Score  Paul Score: 19 Paul Score (if score 14 or less) []PMT consult  
[]Wound Care consult []Specialty bed  
[] Nutrition consult Needs prior to discharge:  
Home O2 required:   
[]Yes []No  
 If yes, how much O2 required? Other:  
 Last Bowel Movement: Last Bowel Movement Date: 10/11/18 Influenza Vaccine Received Flu Vaccine for Current Season (usually Sept-March): Yes Pneumonia Vaccine Diet Active Orders Diet DIET DIABETIC CONSISTENT CARB Regular; No Conc. Sweets LDAs Peripheral IV 10/08/18 Left Hand (Active) Site Assessment Clean, dry, & intact 10/11/2018 11:00 AM  
Phlebitis Assessment 0 10/11/2018 11:00 AM  
Infiltration Assessment 0 10/11/2018 11:00 AM  
Dressing Status Clean, dry, & intact 10/11/2018 11:00 AM  
Dressing Type Tape;Transparent 10/11/2018 11:00 AM  
Hub Color/Line Status Flushed 10/11/2018 10:03 AM  
                  
Urinary Catheter [REMOVED] Urinary Catheter 10/08/18 [facility] - Temperature-Indications for Use: Accurate measurement of urinary output Intake & Output Date 10/10/18 1900 - 10/11/18 3510 10/11/18 0700 - 10/12/18 5904 Shift 8964-0580 24 Hour Total 1722-4737 6533-7410 24 Hour Total  
I 
N 
T 
A 
K 
E 
 P.O. 360 610 720  720 P. O. 360 610 720  720 I.V. 
(mL/kg/hr) 100 355.6 Insulin Volume  25.6 Amiodarone Volume 100 100 Volume (0.45% sodium chloride infusion)  100 Volume (0.9% sodium chloride infusion)  90 Volume (ceFAZolin (ANCEF) 2 g/20 mL in sterile water IV syringe)  40 Shift Total 
(mL/kg) 460 
(3.6) 965.6 
(7.6) 720 
(5.6)  720 
(5.6) O 
U T 
P 
U Prabhjot Gerardo Urine (mL/kg/hr) 500 770 500 
(0.3)  500 Urine Voided 500 675 500  500 Urine Occurrence(s) 1 x 1 x Urine Output (mL) ([REMOVED] Urinary Catheter 10/08/18 [facility] - Temperature)  95 Drains  60 Output (ml) ([REMOVED] Saeed Drain #1 10/08/18 Mid Chest)  60 Stool Stool Occurrence(s)   1 x  1 x  Shift Total 
 (mL/kg) 500 
(3.9) 830 
(6.5) 500 
(3.9)  500 
(3.9) NET -40 135.6 220  220 Weight (kg) 127.8 127.8 127.8 127.8 127.8 Readmission Risk Assessment Tool Score Low Risk   
      
 7 Total Score 3 Has Seen PCP in Last 6 Months (Yes=3, No=0) 2 . Living with Significant Other. Assisted Living. LTAC. SNF. or  
Rehab  
 2 Charlson Comorbidity Score (Age + Comorbid Conditions) Criteria that do not apply:  
 Patient Length of Stay (>5 days = 3) IP Visits Last 12 Months (1-3=4, 4=9, >4=11) Pt. Coverage (Medicare=5 , Medicaid, or Self-Pay=4) Expected Length of Stay 6d 0h  
Actual Length of Stay 3

## 2018-10-12 NOTE — PROGRESS NOTES
PCU SHIFT NURSING NOTE Bedside shift change report given to [place] (oncoming nurse) by Stanford Pryor (offgoing nurse). Report included the following information SBAR. Shift Summary:  
2350 received report at bedside. Pt resting quietly. With HOB up. Assisted to bathroom x 1. Admission Date 10/8/2018 Admission Diagnosis CAD 
CAD (coronary artery disease) CAD (coronary artery disease), native coronary artery Consults None Consults []PT []OT []Speech  
[]Case Management  
  
[] Palliative Cardiac Monitoring Order []Yes []No  
 
IV drips []Yes Drip:                            Dose: 
Drip:                            Dose: 
Drip:                            Dose:  
[]No  
 
GI Prophylaxis []Yes []No  
 
 
 
DVT Prophylaxis SCDs:  Sequential Compression Device: Bilateral  
  Patient Refused VTE Prophylaxis: Yes Scooter stockings:     
  
[] Medication []Contraindicated []None Activity Level Activity Level: Up with Assistance Activity Assistance: Partial (one person) Purposeful Rounding every 1-2 hour? []Yes  Score  Total Score: 3 Bed Alarm (If score 3 or >) []Yes  
[] Refused (See signed refusal form in chart) Paul Score  Paul Score: 19 Paul Score (if score 14 or less) []PMT consult  
[]Wound Care consult []Specialty bed  
[] Nutrition consult Needs prior to discharge:  
Home O2 required:   
[]Yes []No  
 If yes, how much O2 required? Other:  
 Last Bowel Movement: Last Bowel Movement Date: 10/11/18 Influenza Vaccine Received Flu Vaccine for Current Season (usually Sept-March): Yes Pneumonia Vaccine Diet Active Orders Diet DIET DIABETIC CONSISTENT CARB Regular; No Conc. Sweets LDAs Peripheral IV 10/08/18 Left Hand (Active) Site Assessment Clean, dry, & intact 10/12/2018  3:12 AM  
Phlebitis Assessment 0 10/12/2018  3:12 AM  
 Infiltration Assessment 0 10/12/2018  3:12 AM  
Dressing Status Clean, dry, & intact 10/12/2018  3:12 AM  
Dressing Type Transparent 10/12/2018  3:12 AM  
Hub Color/Line Status Pink 10/12/2018  3:12 AM  
Alcohol Cap Used Yes 10/12/2018  3:12 AM  
                  
Urinary Catheter [REMOVED] Urinary Catheter 10/08/18 [facility] - Temperature-Indications for Use: Accurate measurement of urinary output Intake & Output Date 10/11/18 0700 - 10/12/18 4355 10/12/18 0700 - 10/13/18 6948 Shift 3450-0346 6961-4359 24 Hour Total 2153-8414 1839-3832 24 Hour Total  
I 
N 
T 
A 
K 
E 
 P. O. 720 240 960     
   P. O. 720 240 960 Shift Total 
(mL/kg) 720 
(5.6) 240 
(1.9) 960 
(7.5) O 
U T 
P 
U Nora Peppers Urine (mL/kg/hr) 500 
(0.3)  500 Urine Voided 500  500 Urine Occurrence(s)  1 x 1 x Stool Stool Occurrence(s) 1 x  1 x Shift Total 
(mL/kg) 500 
(3.9)  500 
(3.9)  240 460 Weight (kg) 127.8 127.8 127.8 127.8 127.8 127.8 Readmission Risk Assessment Tool Score Low Risk   
      
 7 Total Score 3 Has Seen PCP in Last 6 Months (Yes=3, No=0) 2 . Living with Significant Other. Assisted Living. LTAC. SNF. or  
Rehab  
 2 Charlson Comorbidity Score (Age + Comorbid Conditions) Criteria that do not apply:  
 Patient Length of Stay (>5 days = 3) IP Visits Last 12 Months (1-3=4, 4=9, >4=11) Pt. Coverage (Medicare=5 , Medicaid, or Self-Pay=4) Expected Length of Stay 6d 0h  
Actual Length of Stay 4

## 2018-10-12 NOTE — PROGRESS NOTES
Problem: Mobility Impaired (Adult and Pediatric) Goal: *Acute Goals and Plan of Care (Insert Text) Physical Therapy Goals Initiated 10/9/2018 1. Patient will move from supine to sit and sit to supine , scoot up and down and roll side to side in bed with modified independence within 5 days. 2.  Patient will perform sit to/from stand with modified independence within 5 days. 3.  Patient will ambulate 400 feet with least restrictive assistive device and independence within 5 days. 4.  Patient will ascend/descend 14 stairs with 1 handrail(s) with modified independence within 5 days. 5.  Patient will perform cardiac exercises per protocol with independence within 5 days. 6.  Patient will verbally and functionally recall 3/3 sternal precautions within 5 days. physical Therapy TREATMENT Patient: Tristan Hale (63 y.o. male) Date: 10/12/2018 Diagnosis: CAD 
CAD (coronary artery disease) CAD (coronary artery disease), native coronary artery <principal problem not specified> Procedure(s) (LRB): 
CORONARY ARTERY BYPASS GRAFT TIMES THREE  WITH RIGHT SAPHENOUS VEIN GRAFT ( ENDOSCOPIC HARVEST ) AND LEFT INTERNAL MAMMARY ARTERY WITH EXTRA CORPOREAL CIRCULATION. TRACI AND EPIAORTIC ULTRAOUND DONE BY DR SANDOVAL [facility]. (N/A) 4 Days Post-Op Precautions: Fall, Sternal 
Chart, physical therapy assessment, plan of care and goals were reviewed. ASSESSMENT: 
Pt received sitting in bedside chair with daughter present and agreeable to PT intervention. Pt cleared by nursing for mobility. Reported 0/10 pain and VSS on RA at rest. Able to recall 3/3 sternal precautions and with good understanding throughout. Transfers performed with supervision. Standing balance intact. He ambulated 175 ft with CGA/SBA and no AD, exhibiting improved gait quality overall (noting improved gait speed, arm swing, step length, and step clearance).  He ascended/descended 14 steps with R railing and CGA and then additional 2 steps without railing support with CGA. Pt needed two short standing rest breaks during stair training due to increased fatigue and SOB with SaO2 >90%. Pt with improved tolerance for activity overall, however did provide education re: activity pacing and increasing endurance once home. Pt was left sitting in bedside chair with all needs met, RN aware, VSS, and family present following therapy session. Recommend pt discharge home with family support and OP cardiac rehab once cleared medically by MD. 
   
Progression toward goals: 
[x]      Improving appropriately and progressing toward goals 
[]      Improving slowly and progressing toward goals 
[]      Not making progress toward goals and plan of care will be adjusted PLAN: 
Patient continues to benefit from skilled intervention to address the above impairments. Continue treatment per established plan of care. Discharge Recommendations:  Outpatient Cardiac Rehab Further Equipment Recommendations for Discharge:  None SUBJECTIVE:  
Patient stated No pain really.  The patient stated 3/3 sternal precautions. Reviewed all 3 with patient. OBJECTIVE DATA SUMMARY:  
Patient mobilized on continuous portable monitor/telemetry. Critical Behavior: 
Neurologic State: Alert Orientation Level: Oriented X4 Cognition: Appropriate decision making, Appropriate for age attention/concentration, Appropriate safety awareness Safety/Judgement: Awareness of environment, Insight into deficits, Fall prevention Functional Mobility Training: 
Bed Mobility: 
Log   
  
  
Scooting: Modified independent Transfers: 
Sit to Stand: Supervision Stand to Sit: Supervision Bed to Chair: Supervision Balance: 
Sitting: Intact Standing: Intact Standing - Static: Good Standing - Dynamic : Good Ambulation/Gait Training: 
Distance (ft): 175 Feet (ft) Assistive Device: Gait belt Ambulation - Level of Assistance: Contact guard assistance;Stand-by assistance;Assist x1 Gait Abnormalities: Decreased step clearance Base of Support: Widened Speed/Guerita: Slow Step Length: Right shortened;Left shortened Stairs: 
Number of Stairs Trained: 14 (additional 2 steps without railings) Stairs - Level of Assistance: Contact guard assistance Rail Use: Right Therapeutic Exercises: Educated pt on performance of cardiac exercises once home Pain: 
Pain Scale 1: Numeric (0 - 10) Pain Intensity 1: 0 Activity Tolerance:  
Improving - increased activity; needed one standing rest break with stair training due to SOB with SaO2 >90% on RA throughout; pain 0/10 Please refer to the flowsheet for vital signs taken during this treatment. After treatment:  
[x] Patient left in no apparent distress sitting up in chair 
[] Patient left in no apparent distress in bed 
[x] Call bell left within reach [x] Nursing notified 
[x] Caregiver present 
[] Bed alarm activated COMMUNICATION/COLLABORATION:  
The patients plan of care was discussed with: Physical Therapist and Registered Nurse Harlo Halsted, PT, DPT Time Calculation: 15 mins

## 2018-10-12 NOTE — PROGRESS NOTES
[facility] FLOOR Progress Note Admit Date: 10/8/2018 POD: 4 Days Post-Op Assessment:  
 
Active Problems: 
  CAD (Coronary Artery Disease) - stent 1999 (10/20/2009) CAD (coronary artery disease), native coronary artery (10/8/2018) Procedure(s): CORONARY ARTERY BYPASS GRAFT TIMES THREE  WITH RIGHT SAPHENOUS VEIN GRAFT ( ENDOSCOPIC HARVEST ) AND LEFT INTERNAL MAMMARY ARTERY WITH EXTRA CORPOREAL CIRCULATION. TRACI AND EPIAORTIC ULTRAOUND DONE BY DR SANDOVAL [facility]. Summary Stable hemodynamics in sinus rhythm without ectopy on no support. Ambulating, self care Incisions quite Needs to breath deeper Wants to go home Plan/Recommendations/Medical Decision Making:  
 
Increase Bumex to 2 mg daily Increase BB Epicardial wires pulled Home today Increase activity Increase I/S effort and frequency Sternal precautions Stimulate bowel movement Patient seen and reviewed with Dr. Mohsen Wilkinson MD 
 
 
Objective:  
 
 
Visit Vitals  /78  Pulse 70  Temp 98.7 °F (37.1 °C)  Resp 18  Ht 6' 1\" (1.854 m)  Wt 275 lb 12.7 oz (125.1 kg)  SpO2 94%  BMI 36.39 kg/m2 Temp (24hrs), Av.2 °F (37.3 °C), Min:98.3 °F (36.8 °C), Max:100.3 °F (37.9 °C) Last 3 Recorded Weights in this Encounter 10/10/18 6215 10/11/18 4324 10/12/18 7813 Weight: 286 lb 13.1 oz (130.1 kg) 281 lb 12 oz (127.8 kg) 275 lb 12.7 oz (125.1 kg) CXR Results  (Last 48 hours) 10/11/18 0749  XR CHEST PA LAT Final result Impression:  IMPRESSION:  
1. Moderate bibasilar atelectasis with small pleural effusions. Narrative:  Exam:  2 view chest  
   
Indication: Chest tube removal.  
   
COMPARISON: 10/10/2018 PA and lateral views demonstrate mild cardiomegaly in this patient status post  
median sternotomy. Lungs demonstrate bibasilar atelectasis medially in both  
lower lobes of moderate degree. This is unchanged.  Small pleural effusions are  
 noted. No pulmonary edema. No pneumothorax. Lab Data Reviewed: Recent Labs 10/12/18 
 7896   10/11/18 
 0601 WBC   --    --   10.4 HGB   --    --   9.1* HCT   --    --   27.0*  
PLT   --    --   126* NA   --    --   136 K   --    --   4.2 BUN   --    --   18  
CREA   --    --   0.81 GLU   --    --   136* GLUCPOC  135*   < >   --   
 < > = values in this interval not displayed. Last 24hr Input/Output: 
 
Intake/Output Summary (Last 24 hours) at 10/12/18 0541 Last data filed at 10/12/18 0500 Gross per 24 hour Intake              840 ml Output              950 ml Net             -110 ml Admission Weight: Last Weight Weight: 275 lb 9.2 oz (125 kg) Weight: 275 lb 12.7 oz (125.1 kg) EXAM:  General: Feels great Chest: stable sternum Incisions: dry and intact Lungs:   Clear to auscultation bilaterally. Heart:  Regular rate and rhythm, S1, S2 normal, no murmur, no click, no rub Abdomen:   Soft, non-tender. Bowel sounds present. Extremities:  mild edema Neurologic:  Gross motor and sensory apparatus intact.   
 
 
Signed By: LÓPEZ Hearn

## 2018-10-12 NOTE — PROGRESS NOTES
10: 31AM 
D/c order acknowledged by CM. Pt to d/c home with family. PC to [facility] updating on pt's d/c. PCP f/u appt scheduled and on AVS. Pt ready for d/c from CM perspective. CM available for any additional needs. Dinah Dean MSW Care Manager

## 2018-10-12 NOTE — DISCHARGE INSTRUCTIONS
Cardiac Surgery Specialist    [address] [facility] [address]                                          Mani Watson                                        [address], [address] [facility]  Office- [phone]  Fax- [phone]       Office- [phone]  Fax- [phone]  _____________________________________________________________  Dr. Erica Crane PA-C       Name:Florian Griggs Sr     Active Problems:    CAD (Coronary Artery Disease) - stent 1999 (10/20/2009)      CAD (coronary artery disease), native coronary artery (10/8/2018)      S/P CABG x 4 (10/8/2018)        Discharge Date: 10/12/2018     Discharge to: Home    INSTRUCTIONS:  NO SMOKING OR TOBACCO PRODUCTS  Follow all the instructions in your discharge book  You may shower. Wash all incisions twice daily with mild soap and water. No lotions, ointments or powder. Call the office immediately for any redness, swelling, or drainage from your incision. Take your temperature daily and call for a temperature of 101 degrees or higher or for any symptoms that make you think you have and infection. Weigh yourself each morning. Call if you gain more than 5 pounds in 48 hours. Use the incentive spirometer 6-8 times a day-10 breaths each time. Use a pillow or your bear to splint your breastbone when coughing or sneezing. If you feel your breast bone clicking or popping, notify the office immediately. Walk several hundred feet several times daily. DIET  2000 Kcal ADA diet. Check your blood sugars  And keep a log of your results. ACTIVITY  NO DRIVING--you will be evaluated to drive at your follow up visit.   Increase your activity by walking several times a day. Stay out of bed most of the day. When sitting, keep your legs elevated. You may ride in a car, but you must get out every hour and walk around. If you ride in a car with an airbag that can not be switched off, put the seat ALL the way back or ride in the back seat. NO LIFTING MORE THAN 5 POUND FOR 1 MONTH, THEN YOU CAN INCREASE TO NO MORE THAN 10 LBS FOR THE NEXT 2 MONTHS. AFTER 3 MONTHS, YOU WILL NO LONGER HAVE ANY LIFTING RESTRICTIONS. FOLLOW UP           1. You have a follow up with the diabetes treatment center on 10/19/18 at 1 PM prior to your follow up appointment with your surgeon. Your first follow up appointment with your surgeon will be on  10/19/18 at 2 PM  [id number] [facility] office         2. Your appointment with Dr. Cathi Richey  will be arranged later         3. Your second appointment with your surgeon will be on 11/17/18 at 2:15 PM         4. Please call our office at [phone] if you are unable to make either one of these appointments. 5.  Our  will make an appointment with your cardiologist for you after your one month appointment with your surgeon. 6.  Consult you primary care physician regarding your influenza &   pneumovax vaccines. 7.   PLEASE bring your medication bottles to each appointment. 8. Please call Cardiac Rehab at [phone] if you do not already have an appointment. 9. Please sign up for My Chart. CALL [phone] FOR ANY QUESTIONS OR PROBLEMS. Signature:___________________________________________________              Smoking Cessation Program:   This is a free,  phone/text/email based, smoking cessation program. The program is individualized to meet each patient's needs. To enroll use this link [email or web address]

## 2018-10-12 NOTE — PROGRESS NOTES
8:39AM 
 referral accepted by [facility]. Information added to AVS. CM will continue to follow and assist with d/c planning. TERE Robertson Care Manager

## 2018-10-12 NOTE — PROGRESS NOTES
PCU SHIFT NURSING NOTE Bedside shift change report given to Felicia Huerta (oncoming nurse) by Severiano Sager RN (offgoing nurse). Report included the following information SBAR and ED Summary. Shift Summary: 6574 
0800- Mallone PA-into pull wires. Patient tolerated well. Bedrest times one hour. 1046- PT/OT into work with patient. 1127- Discharge instructions given and reviewed with patient and patients family. Prescriptions given, follow up appointment made. Pt.nor family has questions or concerns. PIV and Telemetry removed. Patient discharged to front entrance via wheelchair by volunteer services with all patient belongings. Daughter awaiting to pick patient up. Admission Date 10/8/2018 Admission Diagnosis CAD 
CAD (coronary artery disease) CAD (coronary artery disease), native coronary artery Consults None Consults []PT []OT []Speech  
[]Case Management  
  
[] Palliative Cardiac Monitoring Order []Yes []No  
 
IV drips []Yes Drip:                            Dose: 
Drip:                            Dose: 
Drip:                            Dose:  
[]No  
 
GI Prophylaxis []Yes []No  
 
 
 
DVT Prophylaxis SCDs:  Sequential Compression Device: Bilateral  
  Patient Refused VTE Prophylaxis: Yes Scooter stockings:     
  
[] Medication []Contraindicated []None Activity Level Activity Level: Up with Assistance Activity Assistance: Partial (one person) Purposeful Rounding every 1-2 hour? []Yes  Score  Total Score: 3 Bed Alarm (If score 3 or >) []Yes  
[] Refused (See signed refusal form in chart) Paul Score  Paul Score: 19 Paul Score (if score 14 or less) []PMT consult  
[]Wound Care consult []Specialty bed  
[] Nutrition consult Needs prior to discharge:  
Home O2 required:   
[]Yes []No  
 If yes, how much O2 required? Other:  
 Last Bowel Movement: Last Bowel Movement Date: 10/11/18 Influenza Vaccine Received Flu Vaccine for Current Season (usually Sept-March): Yes Pneumonia Vaccine Diet Active Orders Diet DIET DIABETIC CONSISTENT CARB Regular; No Conc. Sweets LDAs Peripheral IV 10/08/18 Left Hand (Active) Site Assessment Clean, dry, & intact 10/12/2018  3:12 AM  
Phlebitis Assessment 0 10/12/2018  3:12 AM  
Infiltration Assessment 0 10/12/2018  3:12 AM  
Dressing Status Clean, dry, & intact 10/12/2018  3:12 AM  
Dressing Type Transparent 10/12/2018  3:12 AM  
Hub Color/Line Status Pink 10/12/2018  3:12 AM  
Alcohol Cap Used Yes 10/12/2018  3:12 AM  
                  
Urinary Catheter [REMOVED] Urinary Catheter 10/08/18 [place] - Temperature-Indications for Use: Accurate measurement of urinary output Intake & Output Date 10/11/18 0700 - 10/12/18 0201 10/12/18 0700 - 10/13/18 8537 Shift 4470-3999 1002-2322 24 Hour Total 0163-2754 0813-6569 24 Hour Total  
I 
N 
T 
A 
K 
E 
 P. O. 720 240 960     
   P. O. 720 240 960 Shift Total 
(mL/kg) 720 
(5.6) 240 
(1.9) 960 
(7.7) O 
U T 
P 
U Lenell Coleman Urine (mL/kg/hr) 500 
(0.3)  500 
(0.2) Urine Voided 500  500 Urine Occurrence(s)  1 x 1 x Stool Stool Occurrence(s) 1 x  1 x Shift Total 
(mL/kg) 500 
(3.9)  500 
(4)  240 460 Weight (kg) 127.8 125.1 125.1 125.1 125.1 125.1 Readmission Risk Assessment Tool Score Low Risk   
      
 7 Total Score 3 Has Seen PCP in Last 6 Months (Yes=3, No=0) 2 . Living with Significant Other. Assisted Living. LTAC. SNF. or  
Rehab  
 2 Charlson Comorbidity Score (Age + Comorbid Conditions) Criteria that do not apply:  
 Patient Length of Stay (>5 days = 3) IP Visits Last 12 Months (1-3=4, 4=9, >4=11) Pt. Coverage (Medicare=5 , Medicaid, or Self-Pay=4) Expected Length of Stay 6d 0h  
Actual Length of Stay 4

## 2018-10-13 ENCOUNTER — HOME CARE VISIT (OUTPATIENT)
Dept: SCHEDULING | Facility: HOME HEALTH | Age: 58
End: 2018-10-13
Payer: COMMERCIAL

## 2018-10-13 VITALS — BODY MASS INDEX: 21.67 KG/M2 | WEIGHT: 163.5 LBS | HEIGHT: 73 IN

## 2018-10-13 PROCEDURE — G0299 HHS/HOSPICE OF RN EA 15 MIN: HCPCS

## 2018-10-14 NOTE — ANESTHESIA POSTPROCEDURE EVALUATION
Post-Anesthesia Evaluation and Assessment Patient: David Trevino Sr MRN: [id number]  SSN: [id number] YOB: 1960  Age: 63 y.o. Sex: male Cardiovascular Function/Vital Signs Visit Vitals  /81  Pulse 73  Temp 37.1 °C (98.7 °F)  Resp 18  Ht 6' 1\" (1.854 m)  Wt 125.1 kg (275 lb 12.7 oz)  SpO2 94%  BMI 36.39 kg/m2 Patient is status post general anesthesia for Procedure(s): CORONARY ARTERY BYPASS GRAFT TIMES THREE  WITH RIGHT SAPHENOUS VEIN GRAFT ( ENDOSCOPIC HARVEST ) AND LEFT INTERNAL MAMMARY ARTERY WITH EXTRA CORPOREAL CIRCULATION. TRACI AND EPIAORTIC ULTRAOUND DONE BY DR SANDOVAL [facility]. Kaycee Mcfarlane Nausea/Vomiting: None Postoperative hydration reviewed and adequate. Pain: 
Pain Scale 1: Numeric (0 - 10) (10/12/18 0732) Pain Intensity 1: 0 (10/12/18 0732) Managed Neurological Status:  
Neuro (WDL): Within Defined Limits (10/08/18 2569) Neuro Neurologic State: Alert (10/12/18 0734) Orientation Level: Oriented X4 (10/12/18 0734) Cognition: Appropriate decision making; Appropriate for age attention/concentration; Appropriate safety awareness (10/12/18 0734) Speech: Clear (10/12/18 0734) Assessment L Pupil: Round (10/12/18 0734) Size L Pupil (mm): 2 (10/12/18 0734) Assessment R Pupil: Round (10/12/18 0734) Size R Pupil (mm): 2 (10/12/18 0734) LUE Motor Response: Purposeful;Spontaneous  (10/12/18 0734) LLE Motor Response: Purposeful;Spontaneous  (10/12/18 0734) RUE Motor Response: Purposeful;Spontaneous  (10/12/18 0734) RLE Motor Response: Purposeful;Spontaneous  (10/12/18 0734) At baseline Mental Status and Level of Consciousness: Arousable Pulmonary Status:  
O2 Device: Room air (10/12/18 0734) Adequate oxygenation and airway patent Complications related to anesthesia: None Post-anesthesia assessment completed. No concerns Signed By: Ani Rogel MD   
 October 13, 2018

## 2018-10-15 ENCOUNTER — HOME CARE VISIT (OUTPATIENT)
Dept: SCHEDULING | Facility: HOME HEALTH | Age: 58
End: 2018-10-15
Payer: COMMERCIAL

## 2018-10-15 ENCOUNTER — PATIENT OUTREACH (OUTPATIENT)
Dept: INTERNAL MEDICINE CLINIC | Age: 58
End: 2018-10-15

## 2018-10-15 VITALS
BODY MASS INDEX: 34.96 KG/M2 | OXYGEN SATURATION: 99 % | WEIGHT: 265 LBS | RESPIRATION RATE: 20 BRPM | DIASTOLIC BLOOD PRESSURE: 76 MMHG | SYSTOLIC BLOOD PRESSURE: 132 MMHG | TEMPERATURE: 98.4 F | HEART RATE: 76 BPM

## 2018-10-15 PROCEDURE — G0300 HHS/HOSPICE OF LPN EA 15 MIN: HCPCS

## 2018-10-15 NOTE — ACP (ADVANCE CARE PLANNING)
Advance Care Planning:   Does patient have an Advance Directive:  reports that his Nilesh Tompkins is drawing up an AMD for him at present. Requested that if completed to provide a copy of AMD to PCP office.

## 2018-10-15 NOTE — PROGRESS NOTES
Hospital Discharge Follow-Up Date/Time:  10/15/2018 12:00 PM 
 
Patient was admitted to [facility] on 10/8/18 and discharged on 10/12/18 for CABG x 3. The physician discharge summary was available at the time of outreach. Patient was contacted within 1 business days of discharge. Top Challenges reviewed with the provider  
- has office visit scheduled with Dr. Pj Hill (cardiothoracic surgery) 10/19/18 Method of communication with provider :chart routing Inpatient RRAT score: 7 Was this a readmission? no  
Patient stated reason for the readmission: N/A Nurse Navigator (NN) contacted the patient by telephone to perform post hospital discharge assessment. Verified name and  with patient as identifiers. Provided introduction to self, and explanation of the Nurse Navigator role. The patient agrees to contact the PCP office for questions related to their healthcare. NN provided contact information for future reference. Disease Specific:   CABG Home Health orders at discharge: SN [address]: [facility] Date of initial visit: 10/13/18 Durable Medical Equipment ordered/company: None Durable Medical Equipment received: N/A Recommended OP Follow-Up: 
[facility] OP Cardiac Rehab - 18 Dr. Nakul Trevino - 10/25/18 Dr. Pj Hill (cardiothoracic surgery) - 10/19/18 [facility] Diab Treatment - 10/19/18 Barriers to care? none reported/identified at this time Advance Care Planning:  
Does patient have an Advance Directive:  reports that his Clearence  is drawing up an AMD for him at present. Requested that if completed to provide a copy of AMD to PCP office. Medication(s):  
New Medications at Discharge: amiodarone, metoprolol, oxycodone, potassium chloride Changed Medications at Discharge: bumex 2 mg (1 tab daily), metformin 1,000 mg (1 tab two times daily) Discontinued Medications at Discharge: atenolol, peridex, losartan Medication reconciliation was performed with patient, who verbalizes understanding of administration of home medications. There were no barriers to obtaining medications identified at this time. Referral to Pharm D needed: no  
 
Med Rec during this Call Current Outpatient Prescriptions Medication Sig  
 amLODIPine (NORVASC) 5 mg tablet Take 1 Tab by mouth daily for 180 days.  ascorbic acid, vitamin C, (VITAMIN C) 1,000 mg tablet Take 1 Tab by mouth daily for 180 days.  bumetanide (BUMEX) 2 mg tablet Take 1 Tab by mouth daily for 30 days.  metFORMIN (GLUCOPHAGE) 1,000 mg tablet Take 1 Tab by mouth two (2) times daily (with meals) for 60 days.  metoprolol tartrate (LOPRESSOR) 25 mg tablet Take 1 Tab by mouth every twelve (12) hours for 60 days.  oxyCODONE IR (ROXICODONE) 5 mg immediate release tablet Take 1 Tab by mouth every eight (8) hours as needed. Max Daily Amount: 15 mg.  
 amiodarone (PACERONE) 400 mg tablet Take 1 Tab by mouth daily for 30 days.  potassium chloride SR (K-TAB) 20 mEq tablet Take 1 Tab by mouth daily for 30 days.  glucose blood VI test strips (ONETOUCH VERIO) strip Twice daily  lancets (ONETOUCH DELICA LANCETS) 30 gauge misc Twice daily  sAXagliptin (ONGLYZA) 5 mg tab tablet Take 1 Tab by mouth daily.  omeprazole (PRILOSEC) 20 mg capsule Take 1 Cap by mouth daily.  allopurinol (ZYLOPRIM) 300 mg tablet TAKE 1 TABLET BY MOUTH EVERY DAY  multivitamin (ONE A DAY) tablet Take 1 Tab by mouth daily.  atorvastatin (LIPITOR) 80 mg tablet Take 80 mg by mouth daily.  aspirin 81 mg Tab Take 81 mg by mouth daily. No current facility-administered medications for this visit. There are no discontinued medications. [facility] follow up appointment(s): Future Appointments Date Time Provider Mary Mccallum 10/17/2018 To Be Determined Christine Kilpatrick LPN [facility] [place]  
 10/19/2018 1:00 PM [address], [facility]DE [facility]  
10/19/2018 2:00 PM Chey Schmidt MD [address]  
10/19/2018 To Be Determined Ayde Rowland LPN [facility]  
10/22/2018 To Be Determined Brigida Ward RN [facility]  
10/24/2018 To Be Determined Brigida Ward RN [address]  
10/25/2018 2:15 PM Louis Kearney MD [address]  
10/26/2018 To Be Determined Brigida Ward RN [address]  
11/5/2018 1:00 PM [facility] CARDIOPULM SPECIALTY [facility]  
11/7/2018 2:15 PM [facility] [address] [facility]  
12/6/2018 8:15 AM Louis Kearney MD [address] [facility] follow up appointment(s): Dr. Pavel Jalloh (Cardiology) [facility]:  n/a ; Cardiac Surgery patient Goals Post Hospitalization  Attends follow-up appointments as directed. 10/15/2018  
- to follow-up with [facility] 10/19/18 
- has office visit scheduled with Dr. Vanessa Tony (cardiothoracic surgery) 10/19/18 
- has office visit scheduled with Dr. David Valenzuela 10/25/18 
- to begin OP Cardiac Rehab 11/5/18 
- patient to schedule follow-up with Dr. Pavel Jalloh (Cardiology) per Dr. Javier patricio

## 2018-10-17 ENCOUNTER — HOME CARE VISIT (OUTPATIENT)
Dept: SCHEDULING | Facility: HOME HEALTH | Age: 58
End: 2018-10-17
Payer: COMMERCIAL

## 2018-10-17 PROCEDURE — G0300 HHS/HOSPICE OF LPN EA 15 MIN: HCPCS

## 2018-10-18 ENCOUNTER — OFFICE VISIT (OUTPATIENT)
Dept: CARDIOTHORACIC SURGERY | Age: 58
End: 2018-10-18

## 2018-10-18 VITALS
RESPIRATION RATE: 12 BRPM | HEART RATE: 61 BPM | TEMPERATURE: 97.8 F | OXYGEN SATURATION: 97 % | WEIGHT: 252 LBS | DIASTOLIC BLOOD PRESSURE: 62 MMHG | SYSTOLIC BLOOD PRESSURE: 118 MMHG | BODY MASS INDEX: 33.25 KG/M2

## 2018-10-18 VITALS
HEART RATE: 77 BPM | BODY MASS INDEX: 33.86 KG/M2 | OXYGEN SATURATION: 98 % | HEIGHT: 72 IN | WEIGHT: 250 LBS | DIASTOLIC BLOOD PRESSURE: 74 MMHG | SYSTOLIC BLOOD PRESSURE: 124 MMHG

## 2018-10-18 DIAGNOSIS — Z95.1 S/P CABG X 4: Primary | ICD-10-CM

## 2018-10-18 NOTE — OP NOTES
[facility] 
OPERATIVE REPORT Name:CULLEN BAER SR MR#: [id number] : 1960 ACCOUNT #: [de-identified] DATE OF SERVICE: 10/08/2018 AMENDED REPORT: Revised signing physician 10/18/2018/norbert PREOPERATIVE DIAGNOSIS:  Coronary artery disease. POSTOPERATIVE DIAGNOSIS:  Coronary artery disease. PROCEDURE PERFORMED:  Coronary artery bypass grafts, using left internal mammary artery to the anterior descending, saphenous vein graft from the aorta to the posterior descending artery, and saphenous vein graft from the ascending aorta to the first marginal branch of the circumflex; endoscopic vein harvesting from the right lower extremity; TRACI by Dr. Shell Middleton. SURGEON:  Krzysztof Mckeon MD 
 
ASSISTANT:  Jd Paz MD and Carmen Byrd. ANESTHESIOLOGIST:  Karlene Rasheed MD 
 
ANESTHESIA:  General endotracheal. 
 
SPECIMENS REMOVED: None. ESTIMATED BLOOD LOSS:  1 liter. IMPLANTS:  None. COMPLICATIONS:  None recognized. PROCEDURE:  The patient was taken to the operating room and placed on the operating table in the supine position. After induction of general endotracheal anesthesia, Dr. Shell Middleton placed a TRACI probe. TRACI findings were discussed. The operative field was prepared with chlorhexidine. Sterile drapes were applied. The saphenous vein was harvested endoscopically from the right lower extremity while median sternotomy was performed. The left internal mammary artery was harvested with its pedicle and then wrapped in a papaverine soaked sponge. The patient was systemically anticoagulated with heparin. Pericardium was opened longitudinally and suspended. The ascending aorta was interrogated with the ultrasound probe but no prohibitive lesions were found. The patient was cannulated in the ascending aorta and in the right auricle using a 2 stage venous cannula.  A combination of cardioplegia and root suction cannula was placed in the ascending aorta. The patient was placed on cardiopulmonary bypass and cooled to 34 degrees centigrade. The aorta was crossclamped crossclamped and cold hyperkalemic sanguineous cardioplegic solution was instilled antegrade into the aortic root. Topical cold solution was applied to the pericardial well. The antegrade cardioplegia was repeated intermittently throughout the period of aortic cross-clamping. The vein grafts were added to the current antegrade circuit as they became available. First, length of saphenous vein was taken end-to-side to the posterior descending artery for end-to-side anastomosis parallel in orientation using running 7-0 Prolene. The posterior descending was diffusely diseased at the site of arteriotomy and had only about a 1.6 mm inside diameter. The anastomosis was technically satisfactory. The next length of saphenous vein was taken end-to-side for anastomosis with the first marginal branch of the circumflex which was approached in an intramyocardial location where it was seemed to be a 2.5 mm coronary artery with nice soft walls in this location. The anastomosis was technically satisfactory, parallel in orientation. The mammary artery was trimmed and spatulated and taken end-to-side for anastomosis on the anterior descending. This seemed to be about a 2.2 mm artery. The anastomosis was technically satisfactory using running 7-0 Prolene. The mammary was tacked at the epicardium. The 2 vein grafts were originated from the ascending aorta using punch aortotomies and running 6-0 Prolene. After appropriate de-airing maneuvers, the aortic crossclamp was removed. Spontaneous cardiac contractions were resumed. The grafts were seen to lie nicely and to be hemostatic. The patient was  from cardiopulmonary bypass without difficulty. TRACI showed good left ventricular function. The grafts were seen to lie nicely. Anticoagulation was reversed with protamine. Decannulation was completed. Hemostasis was assured. This required a period of waiting due to some epicardial vein bleeding near the obtuse marginal anastomosis. The temporary bipolar pacing lead was placed in the anterior aspect of the right ventricle. Two temporary pacing leads were sewn to the right atrial free wall. Saeed drains were placed in the left pleural space, the inferior pericardial cavity, and the anterior pericardial cavity. The pericardium was not closed. Good hemodynamic function continued. Sternum was closed with stainless steel wire, linea alba was closed with interrupted 0 Vicryl. The presternal fascia closed with running 0 Vicryl. Dermis was approximated with a running Monocryl. Sterile dressing was applied. Patient had a technically satisfactory revascularization procedure that went well. MD NADIR Perry / CLAUDIO 
D: 10/08/2018 13:33    
T: 10/08/2018 15:03 JOB #: [id number]

## 2018-10-18 NOTE — OP NOTES
[facility]  OPERATIVE REPORT    Radha Gutierrez  MR#: [id number]  : 1960  ACCOUNT #: [de-identified]   DATE OF SERVICE: 10/08/2018    AMENDED REPORT: : Revised signing provider/10/18/2018/bjs      PREOPERATIVE DIAGNOSIS:  Coronary artery disease. POSTOPERATIVE DIAGNOSIS:  Coronary artery disease. PROCEDURE PERFORMED:  Coronary artery bypass grafts, using left internal mammary artery to the anterior descending, saphenous vein graft from the aorta to the posterior descending artery, and saphenous vein graft from the ascending aorta to the first marginal branch of the circumflex;  endoscopic vein harvesting from the right lower extremity;  TRACI by Dr. Jarett Laboy. SURGEON:  Tenisha Persaud MD    ASSISTANT:  Josse Draper MD and Carmen Ba.      ANESTHESIOLOGIST:  Eldon Schaefer MD    ANESTHESIA:  General endotracheal.    SPECIMENS REMOVED: None. ESTIMATED BLOOD LOSS:  1 liter. IMPLANTS:  None. COMPLICATIONS:  None recognized. PROCEDURE:  The patient was taken to the operating room and placed on the operating table in the supine position. After induction of general endotracheal anesthesia, Dr. Jarett Laboy placed a TRACI probe. TRACI findings were discussed. The operative field was prepared with chlorhexidine. Sterile drapes were applied. The saphenous vein was harvested endoscopically from the right lower extremity while median sternotomy was performed. The left internal mammary artery was harvested with its pedicle and then wrapped in a papaverine soaked sponge. The patient was systemically anticoagulated with heparin. Pericardium was opened longitudinally and suspended. The ascending aorta was interrogated with the ultrasound probe but no prohibitive lesions were found. The patient was cannulated in the ascending aorta and in the right auricle using a two-stage venous cannula.   A combination of cardioplegia and root suction cannula was placed in the ascending aorta.  The patient was placed on cardiopulmonary bypass and cooled to 34 degrees centigrade. The aorta was crossclamped and cold hyperkalemic sanguineous cardioplegic solution was instilled antegrade into the aortic root. Topical cold solution was applied to the pericardial well. The antegrade cardioplegia was repeated intermittently throughout the period of aortic cross-clamping. The vein grafts were added to the antegrade circuit as they became available. The first length of saphenous vein was taken end-to-side to the posterior descending artery for anastomosis parallel in orientation using running 7-0 Prolene. The posterior descending was diffusely diseased at the site of arteriotomy and had only about a 1.6 mm inside diameter. The anastomosis was technically satisfactory. The next length of saphenous vein was taken end-to-side for anastomosis with the first marginal branch of the circumflex which was approached in an intramyocardial location where it was seen to be a 2.5 mm coronary artery with nice soft walls. The anastomosis was technically satisfactory, parallel in orientation. The mammary artery was taken end-to-side for anastomosis on the anterior descending. This was seen to be about a 2.2 mm artery. The anastomosis was technically satisfactory using running 7-0 Prolene. The mammary pedicle was tacked to the epicardium. The two vein grafts were originated from the ascending aorta using punch aortotomies and running 6-0 Prolene. After appropriate de-airing maneuvers, the aortic crossclamp was removed. Spontaneous cardiac contractions were resumed. The grafts were seen to lie nicely and to be hemostatic. The patient was  from cardiopulmonary bypass without difficulty. TRACI showed good left ventricular function. Anticoagulation was reversed with protamine. Decannulation was completed.   Hemostasis was assured; this required a period of waiting due to some epicardial vein bleeding near the obtuse marginal anastomosis. A temporary bipolar pacing lead was placed in the anterior aspect of the right ventricle. Two temporary pacing leads were sewn to the right atrial free wall. Saeed drains were placed in the left pleural space, the inferior pericardial cavity, and the anterior pericardial cavity. The pericardium was not closed. Good hemodynamic function continued. Sternum was closed with stainless steel wire, linea alba was closed with interrupted 0 Vicryl. The presternal fascia closed with running 0 Vicryl. Dermis was approximated with a running Monocryl. Sterile dressing was applied. Patient had a technically satisfactory revascularization procedure that went well.       MD CARLEEN Hoyos / CLAUDIO  D: 10/08/2018 13:33     T: 10/08/2018 15:03  JOB #: [id number]

## 2018-10-18 NOTE — PROGRESS NOTES
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]  [facility]    Primary Care Provider: Clif Oro MD    Problem List:   Patient Active Problem List   Diagnosis Code    CAD (Coronary Artery Disease) - stent 1999 I25.10    HTN (hypertension), benign I10    Gout M10.9    Hypercholesteremia E78.00    Osteoarthritis of knee M17.10    Obesity E66.9    DM type 2 (diabetes mellitus, type 2) ([address] E11.9    PAU on CPAP G47.33, Z99.89    CAD (coronary artery disease), native coronary artery I25.10    S/P CABG x 4 Z95.1       Surgery:   Past Surgical History:   Procedure Laterality Date    CARDIAC SURG PROCEDURE UNLIST  1996    stent x 2    HX APPENDECTOMY      HX HEART CATHETERIZATION      NEUROLOGICAL PROCEDURE UNLISTED Right 2017    carpal tunnel       Current Medications:   Current Outpatient Medications   Medication Sig Dispense Refill    amLODIPine (NORVASC) 5 mg tablet Take 1 Tab by mouth daily for 180 days. 30 Tab 5    ascorbic acid, vitamin C, (VITAMIN C) 1,000 mg tablet Take 1 Tab by mouth daily for 180 days. 30 Tab 5    bumetanide (BUMEX) 2 mg tablet Take 1 Tab by mouth daily for 30 days. 30 Tab 0    metFORMIN (GLUCOPHAGE) 1,000 mg tablet Take 1 Tab by mouth two (2) times daily (with meals) for 60 days. 60 Tab 1    metoprolol tartrate (LOPRESSOR) 25 mg tablet Take 1 Tab by mouth every twelve (12) hours for 60 days. 60 Tab 1    amiodarone (PACERONE) 400 mg tablet Take 1 Tab by mouth daily for 30 days. 30 Tab 0    potassium chloride SR (K-TAB) 20 mEq tablet Take 1 Tab by mouth daily for 30 days. 30 Tab 0    glucose blood VI test strips (ONETOUCH VERIO) strip Twice daily 100 Strip 5    lancets (ONETOUCH DELICA LANCETS) 30 gauge misc Twice daily 100 Lancet 5    sAXagliptin (ONGLYZA) 5 mg tab tablet Take 1 Tab by mouth daily. 90 Tab 3    omeprazole (PRILOSEC) 20 mg capsule Take 1 Cap by mouth daily.  90 Cap 3    allopurinol (ZYLOPRIM) 300 mg tablet TAKE 1 TABLET BY MOUTH EVERY DAY 90 Tab 3    oxyCODONE IR (ROXICODONE) 5 mg immediate release tablet Take 1 Tab by mouth every eight (8) hours as needed. Max Daily Amount: 15 mg. 40 Tab 0    multivitamin (ONE A DAY) tablet Take 1 Tab by mouth daily.  atorvastatin (LIPITOR) 80 mg tablet Take 80 mg by mouth daily.  aspirin 81 mg Tab Take 81 mg by mouth daily. HPI:chest soreness is minimal    Vitals: Blood pressure 124/74, pulse 77, height 6' 0.44\" (1.84 m), weight 250 lb (113.4 kg), SpO2 98 %. Allergies: has No Known Allergies. Wounds: Healing    Sternum: stable    Lungs: clear    Heart: regular rate and rhythm: No murmur    Abdomen: soft    Extremities: No edema    Rehab : y    Walking: y    Glucometer:     Assessment/Plan:     1. Keep wounds clean, increase walking, and continue sternal precautions. 2. Patient was on Bumex for years before surgery, not sure why. EF 60%. Talked about looking out for dehydration signs.  May cut back to 1 mg or stop next visit

## 2018-10-19 ENCOUNTER — HOSPITAL ENCOUNTER (OUTPATIENT)
Dept: DIABETES SERVICES | Age: 58
Discharge: HOME | End: 2018-10-19
Payer: COMMERCIAL

## 2018-10-19 DIAGNOSIS — E11.9 DIABETES MELLITUS WITHOUT COMPLICATION (HCC): ICD-10-CM

## 2018-10-19 PROCEDURE — G0108 DIAB MANAGE TRN  PER INDIV: HCPCS

## 2018-10-19 NOTE — DIABETES MGMT
DTC Progress Note    Recommendations/ Comments:   1) Pt was instructed to continue to test BG 2 times daily. 2) discontinue juice and to pay attention to portions of fruit consumed with meals and decrease snack portions. 3) try to balance meal and aim for no more that 4 servings of carb/meal.  4) continue to walk daily. Shefali Scott agrees to call educator on Thursday, October 25th with BG (to leave message if not available to the phone. If BG remain elevated with above suggestions, will consider medication adjustments. Patient is a 63 y.o. male with known DM seen post CABG, currently on Onglyza 5 mg q am and Metformin 1000 mg bid at home. Pt is seen individually for f/u post cardiac surgery. He arrived with family today and shared that he will be seen by Cardiology for f/u in early November. Pt shared that he has returned to walking and is walking . 60 minutes daily. He shared that he is tolerating this level without any s/s of stress. He shared that he has made the following dietary changes since discharge: limiting his dining out to one meal daily; avoiding buffets; including more salads with meals along with fruit and vegetables; eliminated sweets/cookies/pies/cakes and 1.5-2 cups ice cream for his usual intakes. He is using fruit for snacks between meals. He did eventually shared that he is consuming >12oz (likely 16 oz of juice daily) and portion of fruit are likely to be 2 if not 3 servings in size when consumed. We reviewed plate method with pt and shared portions using food models/provided carb counting reference and encouraged to plan for ~ 3-4 servings of carbohydrate foods or 45-60 g/meal.  Reviewed protein portions. Reviewed pt's meter memory for BG values 140-256 mg/dl. Highly suspect the high numbers are associated with juice and fruit. Reminded pt of BG targets for wound healing.       A1c:   Lab Results   Component Value Date/Time    Hemoglobin A1c 7.0 (H) 10/04/2018 02:12 PM    Hemoglobin A1c 7.2 (H) 10/02/2018 10:38 AM     Lab Results   Component Value Date/Time    Creatinine 0.81 10/11/2018 04:18 AM     Estimated Creatinine Clearance: 131.6 mL/min (based on SCr of 0.81 mg/dL).     Thank you  Main Hall RD, CDE

## 2018-10-22 ENCOUNTER — HOME CARE VISIT (OUTPATIENT)
Dept: SCHEDULING | Facility: HOME HEALTH | Age: 58
End: 2018-10-22
Payer: COMMERCIAL

## 2018-10-22 VITALS
OXYGEN SATURATION: 98 % | TEMPERATURE: 98.6 F | DIASTOLIC BLOOD PRESSURE: 82 MMHG | SYSTOLIC BLOOD PRESSURE: 138 MMHG | HEART RATE: 69 BPM

## 2018-10-22 PROCEDURE — G0299 HHS/HOSPICE OF RN EA 15 MIN: HCPCS

## 2018-10-24 ENCOUNTER — HOME CARE VISIT (OUTPATIENT)
Dept: SCHEDULING | Facility: HOME HEALTH | Age: 58
End: 2018-10-24
Payer: COMMERCIAL

## 2018-10-24 VITALS
TEMPERATURE: 98.5 F | DIASTOLIC BLOOD PRESSURE: 74 MMHG | RESPIRATION RATE: 20 BRPM | BODY MASS INDEX: 35.5 KG/M2 | HEART RATE: 77 BPM | SYSTOLIC BLOOD PRESSURE: 140 MMHG | OXYGEN SATURATION: 97 % | WEIGHT: 265 LBS

## 2018-10-24 PROCEDURE — G0300 HHS/HOSPICE OF LPN EA 15 MIN: HCPCS

## 2018-10-25 ENCOUNTER — OFFICE VISIT (OUTPATIENT)
Dept: INTERNAL MEDICINE CLINIC | Age: 58
End: 2018-10-25

## 2018-10-25 VITALS
HEART RATE: 73 BPM | WEIGHT: 259 LBS | TEMPERATURE: 98.4 F | DIASTOLIC BLOOD PRESSURE: 73 MMHG | HEIGHT: 72 IN | OXYGEN SATURATION: 97 % | BODY MASS INDEX: 35.08 KG/M2 | SYSTOLIC BLOOD PRESSURE: 111 MMHG

## 2018-10-25 DIAGNOSIS — G89.18 CHEST WALL PAIN FOLLOWING SURGERY: Primary | ICD-10-CM

## 2018-10-25 DIAGNOSIS — R07.89 CHEST WALL PAIN FOLLOWING SURGERY: Primary | ICD-10-CM

## 2018-10-25 PROBLEM — E66.01 SEVERE OBESITY (HCC): Status: ACTIVE | Noted: 2018-10-25

## 2018-10-25 RX ORDER — TRAMADOL HYDROCHLORIDE 50 MG/1
50 TABLET ORAL
Qty: 28 TAB | Refills: 0 | Status: SHIPPED | OUTPATIENT
Start: 2018-10-25 | End: 2019-01-04

## 2018-10-25 NOTE — PROGRESS NOTES
HISTORY OF PRESENT ILLNESS George Lugo is a 63 y.o. male. HPI Here for [facility] visit S/p 4v cabg after had routine NST and then cath Saw surgeon Dr Roanna Riedel last week in f/u On amio and toprol for 4 weeks per protocol No cp sob. F/c Wound has healed Some pain from chest wound when turning over at night Could not tolerate oxyir LDL was 53   7 months ago Has lost 20 lbs with diet Admit date: 10/8/2018 
  
Discharge date: 10/12/2018  
  
Discharge to: HOME 
  
Admitting Physician: Vicky Kamara MD  
  
Referring Cardiologist:  Leni Carter 
  
PCP:  Zack Bernheim, MD 
  
Active Problems: 
  CAD (Coronary Artery Disease) - stent 1999 (10/20/2009) 
  
  CAD (coronary artery disease), native coronary artery (10/8/2018) 
  
  S/P CABG x 4 (10/8/2018)   
  
  
 Procedure(s): CORONARY ARTERY BYPASS GRAFT TIMES THREE  WITH RIGHT SAPHENOUS VEIN GRAFT ( ENDOSCOPIC HARVEST ) AND LEFT INTERNAL MAMMARY ARTERY WITH EXTRA CORPOREAL CIRCULATION. TRACI AND EPIAORTIC ULTRAOUND DONE BY DR SANDOVAL [facility].  
  
Discharge Medications:  
     
Current Discharge Medication List  
   
     
START taking these medications  
  Details  
metoprolol tartrate (LOPRESSOR) 25 mg tablet Take 1 Tab by mouth every twelve (12) hours for 60 days. Qty: 60 Tab, Refills: 1  
   
oxyCODONE IR (ROXICODONE) 5 mg immediate release tablet Take 1 Tab by mouth every eight (8) hours as needed. Max Daily Amount: 15 mg. 
Qty: 40 Tab, Refills: 0  
  Associated Diagnoses: S/P CABG x 4  
   
amiodarone (PACERONE) 400 mg tablet Take 1 Tab by mouth daily for 30 days. Qty: 30 Tab, Refills: 0  
   
potassium chloride SR (K-TAB) 20 mEq tablet Take 1 Tab by mouth daily for 30 days. Qty: 30 Tab, Refills: 0  
   
   
     
CONTINUE these medications which have CHANGED  
  Details  
amLODIPine (NORVASC) 5 mg tablet Take 1 Tab by mouth daily for 180 days.  
Qty: 30 Tab, Refills: 5  
   
ascorbic acid, vitamin C, (VITAMIN C) 1,000 mg tablet Take 1 Tab by mouth daily for 180 days. Qty: 30 Tab, Refills: 5  
  Associated Diagnoses: Coronary artery disease of native artery of native heart with stable angina pectoris (HCC)  
   
bumetanide (BUMEX) 2 mg tablet Take 1 Tab by mouth daily for 30 days. Qty: 30 Tab, Refills: 0  
   
metFORMIN (GLUCOPHAGE) 1,000 mg tablet Take 1 Tab by mouth two (2) times daily (with meals) for 60 days. Qty: 60 Tab, Refills: 1  
   
   
     
CONTINUE these medications which have NOT CHANGED  
  Details sAXagliptin (ONGLYZA) 5 mg tab tablet Take 1 Tab by mouth daily. Qty: 90 Tab, Refills: 3  
   
omeprazole (PRILOSEC) 20 mg capsule Take 1 Cap by mouth daily. Qty: 90 Cap, Refills: 3  
   
allopurinol (ZYLOPRIM) 300 mg tablet TAKE 1 TABLET BY MOUTH EVERY DAY Qty: 90 Tab, Refills: 3  
   
multivitamin (ONE A DAY) tablet Take 1 Tab by mouth daily.  
   
atorvastatin (LIPITOR) 80 mg tablet Take 80 mg by mouth daily.  
   
aspirin 81 mg Tab Take 81 mg by mouth daily.  
   
glucose blood VI test strips (ONETOUCH VERIO) strip Twice daily 
Qty: 100 Strip, Refills: 5  
   
lancets (ONETOUCH DELICA LANCETS) 30 gauge misc Twice daily 
Qty: 100 Lancet, Refills: 5  
   
   
    
STOP taking these medications  
   
  chlorhexidine (PERIDEX) 0.12 % solution Comments:  
Reason for Stopping:   
     
  losartan (COZAAR) 50 mg tablet Comments:  
Reason for Stopping:   
     
  atenolol (TENORMIN) 100 mg tablet Comments:  
Reason for Stopping:   
     
  mupirocin (BACTROBAN) 2 % ointment Comments:  
Reason for Stopping:   
     
   
  
Hospital Course:  
  
Chief Complaint: chest pain and BOURNE 
Jeanette Lei Sr is a 63 y. o. hypertensive, uncontrolled type 2 diabetic with peripheral neuropathy, cigarette smoking, s/p PCI to OM at age 29, cau male who was referred for opinion and advise regarding coronary revascularization  by Dr. Mike Summers MD.  
   
One month history of exertional chest pain with left arm radiation associated dyspnea relieved by rest/ time. No resting symptoms. Ankle edema noticed relieved while supine overnight. Denies nausea, vomiting, claudication, palpitations, PND or syncope.  
   
Cardiac Testing:  
   
Nuclear: inferior ishemia 
   
Echocardiogram 
 Left ventricle: Systolic function was normal. Ejection fraction was 
estimated in the range of 55 % to 60 %. There were no regional wall motion 
abnormalities. There was mild concentric hypertrophy. Right ventricle: The size was at the upper limits of normal. 
 
INDICATIONS: Pre Op Heart surgery. PROCEDURE: This was an urgent study. The study included complete 2D 
imaging, M-mode, complete spectral Doppler, and color Doppler. Image 
quality was adequate. LEFT VENTRICLE: Size was normal. Systolic function was normal. Ejection 
fraction was estimated in the range of 55 % to 60 %. There were no 
regional wall motion abnormalities. There was mild concentric hypertrophy. RIGHT VENTRICLE: The size was at the upper limits of normal. Systolic 
function was normal. 
 
LEFT ATRIUM: Size was normal. 
 
RIGHT ATRIUM: Size was normal. 
 
MITRAL VALVE: Normal valve structure. There was normal leaflet separation. DOPPLER: The transmitral velocity was within the normal range. There was 
no evidence for stenosis. There was trivial regurgitation. AORTIC VALVE: The valve was trileaflet. Leaflets exhibited normal 
thickness and normal cuspal separation. DOPPLER: Transaortic velocity was 
within the normal range. There was no stenosis. There was no regurgitation. TRICUSPID VALVE: Normal valve structure. There was normal leaflet 
separation. DOPPLER: The transtricuspid velocity was within the normal 
range. There was no evidence for tricuspid stenosis. There was trivial 
regurgitation. PULMONIC VALVE: Not well visualized, but normal Doppler findings. DOPPLER: 
There was no regurgitation.  
 
AORTA: The root exhibited normal size.  
  
 Cardiac catheretization films were personally reviewed LM 60-70% Patent OM stent Multiple tight RCA blockages EF ok 
EDP 18 
  
CORONARY ARTERY BYPASS GRAFT TIMES THREE  WITH RIGHT SAPHENOUS VEIN GRAFT ( ENDOSCOPIC HARVEST ) AND LEFT INTERNAL MAMMARY ARTERY WITH EXTRA CORPOREAL CIRCULATION. TRACI AND EPIAORTIC ULTRAOUND DONE BY DR SANDOVAL [facility].  
  
  
POD#1Stable hemodynamics in sinus rhythm without ectopy on Tyrell 20 Inadequate pain control OOB to chair WBC 15 HH 10/31  CXR: bibasilar atel  300/12 hrs Cr 0.9 
UOP 1600 
  
POD#2Stable hemodynamics in sinus rhythm without ectopy on no support. WBC 11 HH 9/27   130/12 hrs CXR poor inspiration Cr 0.9 UOP 1000 +215 
  
POD#3Stable hemodynamics in sinus rhythm without ectopy on no support.   
OOB to chair, working w PT/OT 
WBC 10 HH 9/27  CXR: Moderate bibasilar atelectasis with small pleural effusions. CR 0.81 
/24 hrs +120 POD#4  
Stable hemodynamics in sinus rhythm without ectopy on no support.   
Ambulating, self care Incisions quite Needs to breath deeper Wants to go home Increase Bumex to 2 mg daily Increase BB Epicardial wires pulled Home today 
  
  
Labs:  
   
Recent Labs 10/11/18 
 3703 WBC  10.4 HGB  9.1*  
HCT  27.0*  
CREA  0.81  
  
  
CXR Results  (Last 48 hours)  
           
  10/11/18 0749   XR CHEST PA LAT Final result  
  Impression:   IMPRESSION:  
1. Moderate bibasilar atelectasis with small pleural effusions.  
  
  Narrative:   Exam:  2 view chest  
   
Indication: Chest tube removal.  
   
COMPARISON: 10/10/2018 PA and lateral views demonstrate mild cardiomegaly in this patient status post  
median sternotomy. Lungs demonstrate bibasilar atelectasis medially in both  
lower lobes of moderate degree. This is unchanged. Small pleural effusions are  
noted. No pulmonary edema. No pneumothorax.  
   
  
   
  
  
  
Patient Instructions: See Discharge Instructions Patient was given a separate list of discharge instructions and prescriptions. At this time, all questions were answered and discharge medications were reviewed. The patient has a follow up appointment in one week. 
  
  
Signed: 
LÓPEZ Le 
10/12/2018 10:10 AM  
 
  
  
Cosigned by: Fabienne Tran MD at 10/12/18 1241 Electronically signed by LÓPEZ Hsieh at 10/12/18 1013 Electronically signed by Fabienne Tran MD at 10/12/18 [id number] Note Details Louis Hung [place] File Time 10/12/18 1013 Author Type Physician Assistant Status Signed Last  Francis Pinzon [facility] Acct # [de-identified] Admit Date 10/8/2018 Admission (Discharged) on 10/8/2018 Revision History Detailed Report Note shared with patient Patient Active Problem List  
 Diagnosis Date Noted  CAD (coronary artery disease), native coronary artery 10/08/2018  S/P CABG x 4 10/08/2018  PAU on CPAP 02/01/2016  DM type 2 (diabetes mellitus, type 2) ([facility] [address] 10/19/2013  Obesity 03/08/2011  CAD (Coronary Artery Disease) - stent 1999 10/20/2009  
 HTN (hypertension), benign 10/20/2009  Gout 10/20/2009  Hypercholesteremia 10/20/2009  Osteoarthritis of knee 10/20/2009 Current Outpatient Medications Medication Sig Dispense Refill  amLODIPine (NORVASC) 5 mg tablet Take 1 Tab by mouth daily for 180 days. 30 Tab 5  
 ascorbic acid, vitamin C, (VITAMIN C) 1,000 mg tablet Take 1 Tab by mouth daily for 180 days. 30 Tab 5  
 bumetanide (BUMEX) 2 mg tablet Take 1 Tab by mouth daily for 30 days. 30 Tab 0  
 metFORMIN (GLUCOPHAGE) 1,000 mg tablet Take 1 Tab by mouth two (2) times daily (with meals) for 60 days. 60 Tab 1  
 metoprolol tartrate (LOPRESSOR) 25 mg tablet Take 1 Tab by mouth every twelve (12) hours for 60 days.  60 Tab 1  
 oxyCODONE IR (ROXICODONE) 5 mg immediate release tablet Take 1 Tab by mouth every eight (8) hours as needed. Max Daily Amount: 15 mg. 40 Tab 0  
 amiodarone (PACERONE) 400 mg tablet Take 1 Tab by mouth daily for 30 days. 30 Tab 0  
 potassium chloride SR (K-TAB) 20 mEq tablet Take 1 Tab by mouth daily for 30 days. 30 Tab 0  
 glucose blood VI test strips (ONETOUCH VERIO) strip Twice daily 100 Strip 5  
 lancets (ONETOUCH DELICA LANCETS) 30 gauge misc Twice daily 100 Lancet 5  
 sAXagliptin (ONGLYZA) 5 mg tab tablet Take 1 Tab by mouth daily. 90 Tab 3  
 omeprazole (PRILOSEC) 20 mg capsule Take 1 Cap by mouth daily. 90 Cap 3  
 allopurinol (ZYLOPRIM) 300 mg tablet TAKE 1 TABLET BY MOUTH EVERY DAY 90 Tab 3  
 multivitamin (ONE A DAY) tablet Take 1 Tab by mouth daily.  atorvastatin (LIPITOR) 80 mg tablet Take 80 mg by mouth daily.  aspirin 81 mg Tab Take 81 mg by mouth daily. No Known Allergies Social History Tobacco Use  Smoking status: Light Tobacco Smoker Packs/day: 0.25 Years: 10.00 Pack years: 2.50 Types: Cigarettes  Smokeless tobacco: Never Used Substance Use Topics  Alcohol use: Yes Comment: 22 drinks per week Lab Results Component Value Date/Time WBC 10.4 10/11/2018 04:18 AM  
 HGB 9.1 (L) 10/11/2018 04:18 AM  
 HCT 27.0 (L) 10/11/2018 04:18 AM  
 PLATELET 792 (L) 06/64/4572 04:18 AM  
 MCV 87.7 10/11/2018 04:18 AM  
 
Lab Results Component Value Date/Time Hemoglobin A1c 7.0 (H) 10/04/2018 02:12 PM  
 Hemoglobin A1c 7.2 (H) 10/02/2018 10:38 AM  
 Hemoglobin A1c 7.0 (H) 09/26/2017 10:11 AM  
 Glucose 136 (H) 10/11/2018 04:18 AM  
 Glucose (POC) 135 (H) 10/12/2018 07:14 AM  
 Microalb/Creat ratio (ug/mg creat.) 5.2 03/26/2018 09:50 AM  
 LDL, calculated 53 03/26/2018 09:50 AM  
 Creatinine 0.81 10/11/2018 04:18 AM  
  
Lab Results Component Value Date/Time  Cholesterol, total 152 03/26/2018 09:50 AM  
 Cholesterol (POC) 163 10/04/2011 08:23 AM  
 HDL Cholesterol 35 (L) 03/26/2018 09:50 AM  
 LDL, calculated 53 03/26/2018 09:50 AM  
 LDL Cholesterol (POC) N/A 10/04/2011 08:23 AM  
 Triglyceride 318 (H) 03/26/2018 09:50 AM  
 Triglycerides (POC) 596 10/04/2011 08:23 AM  
 
Lab Results Component Value Date/Time ALT (POC) 27 10/04/2011 08:23 AM  
 ALT (SGPT) 23 10/10/2018 03:11 AM  
 AST (SGOT) 66 (H) 10/10/2018 03:11 AM  
 AST (POC) 29 10/04/2011 08:23 AM  
 Alk. phosphatase 52 10/10/2018 03:11 AM  
 Bilirubin, total 1.0 10/10/2018 03:11 AM  
 Albumin 3.6 10/10/2018 03:11 AM  
 Protein, total 5.8 (L) 10/10/2018 03:11 AM  
 INR 1.2 (H) 10/08/2018 02:50 PM  
 Prothrombin time 11.8 (H) 10/08/2018 02:50 PM  
 PLATELET 601 (L) 21/71/2829 04:18 AM  
 
Lab Results Component Value Date/Time GFR est non-AA >60 10/11/2018 04:18 AM  
 GFR est AA >60 10/11/2018 04:18 AM  
 Creatinine 0.81 10/11/2018 04:18 AM  
 BUN 18 10/11/2018 04:18 AM  
 Sodium 136 10/11/2018 04:18 AM  
 Potassium 4.2 10/11/2018 04:18 AM  
 Chloride 105 10/11/2018 04:18 AM  
 CO2 24 10/11/2018 04:18 AM  
 Magnesium 2.3 10/11/2018 04:18 AM  
  
ROS Physical Exam  
Constitutional: He appears well-developed and well-nourished. No distress. Appears stated age HENT:  
Head: Normocephalic. Cardiovascular: Normal rate, regular rhythm and normal heart sounds. Pulmonary/Chest: Effort normal and breath sounds normal.  
 
 
Abdominal: Soft. Musculoskeletal: He exhibits no edema. Neurological: He is alert. Psychiatric: He has a normal mood and affect. Nursing note and vitals reviewed. ASSESSMENT and PLAN Diagnoses and all orders for this visit: 
 
1. Chest wall pain following surgery 
-     traMADol (ULTRAM) 50 mg tablet; Take 1 Tab by mouth every six (6) hours as needed for Pain. Max Daily Amount: 200 mg. 
 
2. S/p 4v cabg Pt will call for f/c cp sob Will f/u CT surgeon  And cardiologist 
 
3. DM-2 
 a1c 7.2-metformin increased to 1000 mg bid Pt will continue diet and exercise with goal to lower weight further 4. HTN Controlled 5. Obesity I have reviewed/discussed the above normal BMI with the patient. I have recommended the following interventions: dietary management education, guidance, and counseling, encourage exercise and monitor weight . Jeronimo Marquez Follow-up Disposition: 
Return in about 2 months (around 12/25/2018).

## 2018-10-26 ENCOUNTER — HOME CARE VISIT (OUTPATIENT)
Dept: SCHEDULING | Facility: HOME HEALTH | Age: 58
End: 2018-10-26
Payer: COMMERCIAL

## 2018-10-26 PROCEDURE — G0299 HHS/HOSPICE OF RN EA 15 MIN: HCPCS

## 2018-10-27 VITALS
HEART RATE: 77 BPM | SYSTOLIC BLOOD PRESSURE: 124 MMHG | DIASTOLIC BLOOD PRESSURE: 84 MMHG | OXYGEN SATURATION: 98 % | TEMPERATURE: 97.9 F

## 2018-11-05 ENCOUNTER — HOSPITAL ENCOUNTER (OUTPATIENT)
Dept: CARDIAC REHAB | Age: 58
Discharge: HOME | End: 2018-11-05
Payer: COMMERCIAL

## 2018-11-05 VITALS — WEIGHT: 262.8 LBS | HEIGHT: 73 IN | BODY MASS INDEX: 34.83 KG/M2

## 2018-11-05 DIAGNOSIS — Z95.1 S/P CABG (CORONARY ARTERY BYPASS GRAFT): ICD-10-CM

## 2018-11-05 PROCEDURE — 93798 PHYS/QHP OP CAR RHAB W/ECG: CPT

## 2018-11-05 NOTE — PROGRESS NOTES
Cardiopulmonary Rehab Intake Note:  Met with Mr.Bruce Griggs for initial cardiac rehab visit. . Mr. Latasha Weathers is a 49year old patient of Dr. Omar Fontana who presents with a CABG dated 10-8-2018. History of HTN, obesity, sedentary lifestyle, and DM-2. 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        He competed the 6 minute walk test on the treadmill, walking 410 meters, mets 2.8, RPE 13 and RPD 0.  He denied any symptoms during his 6min walk test       Psychosocial: Pt scored 0 on Elsa Anika feels like he does not have stress and states that he does not tend to worry about things. Especially things that are out of his control. He has one son and three daughters and enjoys spending time with his granddaughter as well as fishing.         Patient was given an overview of cardiac rehab program, placement of electrode/leads, and rationale for program. Patient viewed the cardiac rehab DVD and an education notebook was given.       Heart rhythm on the monitor was NSR  Oxygen saturation was 98% on room air. BMI 34.7  Patient's identified goals are  1. He would like to be able to have the energy to play with his granddaughter again. 2.  Be cleared to drive and able to work again.   3. To lose 5lbs

## 2018-11-07 ENCOUNTER — HOSPITAL ENCOUNTER (OUTPATIENT)
Dept: CARDIAC REHAB | Age: 58
Discharge: HOME | End: 2018-11-07
Attending: THORACIC SURGERY (CARDIOTHORACIC VASCULAR SURGERY)
Payer: COMMERCIAL

## 2018-11-07 ENCOUNTER — OFFICE VISIT (OUTPATIENT)
Dept: CARDIOTHORACIC SURGERY | Age: 58
End: 2018-11-07

## 2018-11-07 VITALS
BODY MASS INDEX: 34.19 KG/M2 | WEIGHT: 258 LBS | HEART RATE: 71 BPM | HEIGHT: 73 IN | SYSTOLIC BLOOD PRESSURE: 110 MMHG | DIASTOLIC BLOOD PRESSURE: 60 MMHG

## 2018-11-07 VITALS — BODY MASS INDEX: 34.7 KG/M2 | WEIGHT: 263 LBS

## 2018-11-07 DIAGNOSIS — Z95.1 S/P CABG X 4: Primary | ICD-10-CM

## 2018-11-07 DIAGNOSIS — Z95.1 S/P CABG (CORONARY ARTERY BYPASS GRAFT): ICD-10-CM

## 2018-11-07 PROCEDURE — 93798 PHYS/QHP OP CAR RHAB W/ECG: CPT

## 2018-11-12 ENCOUNTER — HOSPITAL ENCOUNTER (OUTPATIENT)
Dept: CARDIAC REHAB | Age: 58
Discharge: HOME | End: 2018-11-12
Attending: THORACIC SURGERY (CARDIOTHORACIC VASCULAR SURGERY)
Payer: COMMERCIAL

## 2018-11-12 VITALS — WEIGHT: 264 LBS | BODY MASS INDEX: 34.99 KG/M2

## 2018-11-12 PROCEDURE — 93798 PHYS/QHP OP CAR RHAB W/ECG: CPT

## 2018-12-03 NOTE — PROCEDURES
[facility]  PULMONARY FUNCTION    Name:KEYUR Nicolas  MR#: [id number]  : 1960  ACCOUNT #: [de-identified]   DATE OF SERVICE: 10/02/2018    Amended document - imported as WT 2D echo; corrected to PFT - 12/3/18    REASON FOR TEST:  Dyspnea with exertion. Spirometry was performed and it reveals:  1. No airflow obstruction. 2.  Normal FVC. 3.  Normal flow volume loop.         Jennifer Dixon MD       ERG / SN  D: 10/05/2018 13:58     T: 10/05/2018 18:59  JOB #: [id number]  CC: Ruby Delcid MD

## 2018-12-05 ENCOUNTER — TELEPHONE (OUTPATIENT)
Dept: CARDIAC REHAB | Age: 58
End: 2018-12-05

## 2018-12-05 NOTE — TELEPHONE ENCOUNTER
Called patient about his missed appointments since he has not been here since November 12,2018. He plans to return Monday.

## 2018-12-20 DIAGNOSIS — E11.00 TYPE 2 DIABETES MELLITUS WITH HYPEROSMOLARITY WITHOUT COMA, WITHOUT LONG-TERM CURRENT USE OF INSULIN (HCC): Primary | ICD-10-CM

## 2018-12-20 RX ORDER — METFORMIN HYDROCHLORIDE 1000 MG/1
1000 TABLET ORAL 2 TIMES DAILY WITH MEALS
Qty: 60 TAB | Refills: 6 | Status: SHIPPED | OUTPATIENT
Start: 2018-12-20 | End: 2019-06-12 | Stop reason: SDUPTHER

## 2018-12-20 NOTE — TELEPHONE ENCOUNTER
#[phone] Valerie Aguiar states pt needs refill on Metformin HCL 1000 mg 1 tab 2/day  Can this be called in instead of what originally was prescribed? Please call Valerie Aguiar with questions and when this is done.

## 2018-12-23 NOTE — PROGRESS NOTES
Problem: Mobility Impaired (Adult and Pediatric) Goal: *Acute Goals and Plan of Care (Insert Text) Physical Therapy Goals Initiated 10/9/2018 1. Patient will move from supine to sit and sit to supine , scoot up and down and roll side to side in bed with modified independence within 5 days. 2.  Patient will perform sit to/from stand with modified independence within 5 days. 3.  Patient will ambulate 400 feet with least restrictive assistive device and independence within 5 days. 4.  Patient will ascend/descend 14 stairs with 1 handrail(s) with modified independence within 5 days. 5.  Patient will perform cardiac exercises per protocol with independence within 5 days. 6.  Patient will verbally and functionally recall 3/3 sternal precautions within 5 days. physical Therapy TREATMENT Patient: Alex Maxwell (63 y.o. male) Date: 10/11/2018 Diagnosis: CAD 
CAD (coronary artery disease) CAD (coronary artery disease), native coronary artery <principal problem not specified> Procedure(s) (LRB): 
CORONARY ARTERY BYPASS GRAFT TIMES THREE  WITH RIGHT SAPHENOUS VEIN GRAFT ( ENDOSCOPIC HARVEST ) AND LEFT INTERNAL MAMMARY ARTERY WITH EXTRA CORPOREAL CIRCULATION. TRACI AND EPIAORTIC ULTRAOUND DONE BY DR SANDOVAL [facility]. (N/A) 3 Days Post-Op Precautions: Fall, Sternal 
Chart, physical therapy assessment, plan of care and goals were reviewed. ASSESSMENT: 
Pt received sitting in bedside chair with family present and agreeable to PT intervention. Pt cleared by nursing for mobility. VSS on 2 L/min O2 NC. Reported 3/10 pain at rest. Able to recall 3/3 sternal precautions and with good understanding throughout. SaO2 >90% on 2 L/min O2 at rest and during activity this date. Sit<>stand transfers performed with SBA, increased time, and min VCs to improve hip and knee extension during sit>stand.  He ambulated 225 ft with CGA x 1 and no AD, however exhibits slow gait speed, decreased arm swing DORINA, decreased step length DORINA, and increased trunk sway throughout. Gait speed, arm swing, and step length improved with cueing. Pt performed cardiac exercises as noted below in standing after seated rest break following gait training. Needed CGA/SBA for standing exercises. Reported 3/10 pain and c/o increased fatigue post-activity. Pt was left sitting in bedside chair with all needs met, RN aware, family present, and VSS on 2 L/min O2 NC following therapy session. Continue to recommend pt discharge home with family support and OP cardiac rehab once cleared by MD. Recommend stair training during next therapy session. Progression toward goals: 
[x]      Improving appropriately and progressing toward goals 
[]      Improving slowly and progressing toward goals 
[]      Not making progress toward goals and plan of care will be adjusted PLAN: 
Patient continues to benefit from skilled intervention to address the above impairments. Continue treatment per established plan of care. Discharge Recommendations:  Outpatient Cardiac Rehab Further Equipment Recommendations for Discharge:  None SUBJECTIVE:  
Patient stated These chairs hurt my bottom.  The patient stated 3/3 sternal precautions. Reviewed all 3 with patient. OBJECTIVE DATA SUMMARY:  
Patient mobilized on continuous portable monitor/telemetry. Critical Behavior: 
Neurologic State: Alert Orientation Level: Oriented X4 Cognition: Appropriate decision making, Appropriate for age attention/concentration, Appropriate safety awareness, Follows commands Safety/Judgement: Awareness of environment, Insight into deficits, Fall prevention Functional Mobility Training: 
Bed Mobility: 
Log   
  
  
Scooting: Supervision Transfers: 
Sit to Stand: Stand-by assistance Stand to Sit: Stand-by assistance Bed to Chair: Contact guard assistance Balance: 
Sitting: Intact Standing: Impaired; Without support Standing - Static: Good Standing - Dynamic : Good Ambulation/Gait Training: 
Distance (ft): 225 Feet (ft) Assistive Device: Gait belt Ambulation - Level of Assistance: Contact guard assistance;Assist x1;Additional time Gait Abnormalities: Decreased step clearance;Trunk sway increased; Altered arm swing Base of Support: Widened Speed/Guerita: Pace decreased (<100 feet/min) Step Length: Left shortened;Right shortened Therapeutic Exercises:  
Patient instructed on the benefits and demonstrated cardiac exercises while standing with instruction and min VCs. Instructed and indicated understanding on how to progress reps and sets against gravity, working up to 5 lbs and so on based on surgeon clearance for more weight in prep for functional activity. Can use household items for weights. CARDIAC EXERCISE Sets Reps Active Active Assist  
Passive Self ROM Comments Shoulder flexion 1 10 [x]                                            []                                            []                                            []                                              
Shoulder abduction   []                                            []                                            []                                            []                                              
Scapular elevation 1 10 [x]                                            []                                            []                                            []                                              
Scapular retraction 1 10 [x]                                            []                                            []                                            []                                              
Trunk rotation 1 10 [x]                                            []                                            [] []                                              
Trunk sidebending   []                                            []                                            []                                            []                                              
   []                                            []                                            []                                            []                                              
 
Pain: 
Pain Scale 1: Numeric (0 - 10) Pain Intensity 1: 3 Pain Location 1: Incisional 
Pain Orientation 1: Posterior Pain Description 1: Aching Pain Intervention(s) 1: Medication (see MAR) Activity Tolerance:  
Improving - increased gait distance; pain 3/10 pre-activity and post-activity; VSS on 2 L/min O2 NC Please refer to the flowsheet for vital signs taken during this treatment. After treatment:  
[x] Patient left in no apparent distress sitting up in chair 
[] Patient left in no apparent distress in bed 
[x] Call bell left within reach [x] Nursing notified 
[x] Caregiver present 
[] Bed alarm activated COMMUNICATION/COLLABORATION:  
The patients plan of care was discussed with: Physical Therapist and Registered Nurse Mallory Le PT, DPT Time Calculation: 22 mins 23-Dec-2018

## 2019-01-03 NOTE — PROGRESS NOTES
HISTORY OF PRESENT ILLNESS Barbara Nicole is a 63 y.o. male. HPI  
 
F/u dm-2 htn hld CAD hx gout obesity S/p 4v cabg last year-feels well, no cp sob sxs Lost 20 lbs but gained all back and more since last OV Last a1c 7.0 LDL 53 
C/o thick congestion x 3d -no f/c cough a1c today 7.4 
fsbs around 170 fasting No recent gout Request refill of cialis Last OV Here for [facility] visit S/p 4v cabg after had routine NST and then cath Saw surgeon Dr Wilmer Lundborg last week in f/u On amio and toprol for 4 weeks per protocol No cp sob. F/c Wound has healed Some pain from chest wound when turning over at night Could not tolerate oxyir LDL was 53   7 months ago Has lost 20 lbs with diet Last a1c 8.3 LDL 53 
a1c down to 6.9 today 
januvia was added last visit 
fsbs 140 No cp sob Patient Active Problem List  
 Diagnosis Date Noted  Severe obesity ([facility] 10/25/2018  CAD (coronary artery disease), native coronary artery 10/08/2018  S/P CABG x 4 10/08/2018  PAU on CPAP 02/01/2016  DM type 2 (diabetes mellitus, type 2) ([facility] 10/19/2013  Obesity 03/08/2011  CAD (Coronary Artery Disease) - stent 1999 10/20/2009  
 HTN (hypertension), benign 10/20/2009  Gout 10/20/2009  Hypercholesteremia 10/20/2009  Osteoarthritis of knee 10/20/2009 Current Outpatient Medications Medication Sig Dispense Refill  metFORMIN (GLUCOPHAGE) 1,000 mg tablet Take 1 Tab by mouth two (2) times daily (with meals). 60 Tab 6  
 traMADol (ULTRAM) 50 mg tablet Take 1 Tab by mouth every six (6) hours as needed for Pain. Max Daily Amount: 200 mg. 28 Tab 0  
 amLODIPine (NORVASC) 5 mg tablet Take 1 Tab by mouth daily for 180 days. 30 Tab 5  
 ascorbic acid, vitamin C, (VITAMIN C) 1,000 mg tablet Take 1 Tab by mouth daily for 180 days. 30 Tab 5  
 oxyCODONE IR (ROXICODONE) 5 mg immediate release tablet Take 1 Tab by mouth every eight (8) hours as needed.  Max Daily Amount: 15 mg. 40 Tab 0  
  glucose blood VI test strips (ONETOUCH VERIO) strip Twice daily 100 Strip 5  
 lancets (ONETOUCH DELICA LANCETS) 30 gauge misc Twice daily 100 Lancet 5  
 sAXagliptin (ONGLYZA) 5 mg tab tablet Take 1 Tab by mouth daily. 90 Tab 3  
 omeprazole (PRILOSEC) 20 mg capsule Take 1 Cap by mouth daily. 90 Cap 3  
 allopurinol (ZYLOPRIM) 300 mg tablet TAKE 1 TABLET BY MOUTH EVERY DAY 90 Tab 3  
 multivitamin (ONE A DAY) tablet Take 1 Tab by mouth daily.  atorvastatin (LIPITOR) 80 mg tablet Take 80 mg by mouth daily.  aspirin 81 mg Tab Take 81 mg by mouth daily. Not on File Lab Results Component Value Date/Time Hemoglobin A1c 7.0 (H) 10/04/2018 02:12 PM  
 Hemoglobin A1c 7.2 (H) 10/02/2018 10:38 AM  
 Hemoglobin A1c 7.0 (H) 09/26/2017 10:11 AM  
 Glucose 136 (H) 10/11/2018 04:18 AM  
 Glucose (POC) 135 (H) 10/12/2018 07:14 AM  
 Microalb/Creat ratio (ug/mg creat.) 5.2 03/26/2018 09:50 AM  
 LDL, calculated 53 03/26/2018 09:50 AM  
 Creatinine 0.81 10/11/2018 04:18 AM  
  
Lab Results Component Value Date/Time Cholesterol, total 152 03/26/2018 09:50 AM  
 Cholesterol (POC) 163 10/04/2011 08:23 AM  
 HDL Cholesterol 35 (L) 03/26/2018 09:50 AM  
 LDL, calculated 53 03/26/2018 09:50 AM  
 LDL Cholesterol (POC) N/A 10/04/2011 08:23 AM  
 Triglyceride 318 (H) 03/26/2018 09:50 AM  
 Triglycerides (POC) 596 10/04/2011 08:23 AM  
 
Lab Results Component Value Date/Time GFR est non-AA >60 10/11/2018 04:18 AM  
 GFR est AA >60 10/11/2018 04:18 AM  
 Creatinine 0.81 10/11/2018 04:18 AM  
 BUN 18 10/11/2018 04:18 AM  
 Sodium 136 10/11/2018 04:18 AM  
 Potassium 4.2 10/11/2018 04:18 AM  
 Chloride 105 10/11/2018 04:18 AM  
 CO2 24 10/11/2018 04:18 AM  
 Magnesium 2.3 10/11/2018 04:18 AM  
  
ROS Physical Exam  
Constitutional: He appears well-developed and well-nourished. No distress. Appears stated age HENT:  
Head: Normocephalic. Cardiovascular: Normal rate, regular rhythm and normal heart sounds. Exam reveals no gallop and no friction rub. No murmur heard. Pulmonary/Chest: Effort normal and breath sounds normal. No respiratory distress. He has no wheezes. He has no rales. He exhibits no tenderness. Abdominal: Soft. Musculoskeletal: He exhibits no edema. Neurological: He is alert. Psychiatric: He has a normal mood and affect. Nursing note and vitals reviewed. ASSESSMENT and PLAN Diagnoses and all orders for this visit: 1. Controlled type 2 diabetes mellitus with complication, without long-term current use of insulin ([address] -     AMB POC HEMOGLOBIN A1C 7.4 Low carb diet discussion Continue medicines Weight reduction needed-discussed 2. Coronary artery disease involving native coronary artery of native heart without angina pectoris S/p CABG-no cp/angina F/u Dr Aileen Pruitt 3. Essential hypertension Reasonable control, continue medicines,low sodium diet 4. Pure hypercholesterolemia LDL at goal, lipids next OV 5. Acute sinusitis, recurrence not specified, unspecified location Amoxil x 7d 6. Erectile dysfunction, unspecified erectile dysfunction type 
-     tadalafil (CIALIS) 5 mg tablet; Take 1 Tab by mouth daily. 7. Obesity (BMI 30-39. 9) I have reviewed/discussed the above normal BMI with the patient. I have recommended the following interventions: dietary management education, guidance, and counseling and encourage exercise . Ira Lopes Other orders 
-     amoxicillin (AMOXIL) 500 mg capsule; Take 1 Cap by mouth three (3) times daily. Follow-up Disposition: 
Return in about 4 months (around 5/4/2019) for dm-2 htn hld weight .

## 2019-01-04 ENCOUNTER — OFFICE VISIT (OUTPATIENT)
Dept: INTERNAL MEDICINE CLINIC | Age: 59
End: 2019-01-04

## 2019-01-04 VITALS
HEIGHT: 72 IN | WEIGHT: 280 LBS | DIASTOLIC BLOOD PRESSURE: 87 MMHG | BODY MASS INDEX: 37.93 KG/M2 | SYSTOLIC BLOOD PRESSURE: 141 MMHG | OXYGEN SATURATION: 97 % | TEMPERATURE: 98.3 F | HEART RATE: 74 BPM

## 2019-01-04 DIAGNOSIS — I10 ESSENTIAL HYPERTENSION: ICD-10-CM

## 2019-01-04 DIAGNOSIS — E11.8 CONTROLLED TYPE 2 DIABETES MELLITUS WITH COMPLICATION, WITHOUT LONG-TERM CURRENT USE OF INSULIN (HCC): Primary | ICD-10-CM

## 2019-01-04 DIAGNOSIS — J01.90 ACUTE SINUSITIS, RECURRENCE NOT SPECIFIED, UNSPECIFIED LOCATION: ICD-10-CM

## 2019-01-04 DIAGNOSIS — N52.9 ERECTILE DYSFUNCTION, UNSPECIFIED ERECTILE DYSFUNCTION TYPE: ICD-10-CM

## 2019-01-04 DIAGNOSIS — E78.00 PURE HYPERCHOLESTEROLEMIA: ICD-10-CM

## 2019-01-04 DIAGNOSIS — E66.9 OBESITY (BMI 30-39.9): ICD-10-CM

## 2019-01-04 DIAGNOSIS — I25.10 CORONARY ARTERY DISEASE INVOLVING NATIVE CORONARY ARTERY OF NATIVE HEART WITHOUT ANGINA PECTORIS: ICD-10-CM

## 2019-01-04 LAB — HBA1C MFR BLD HPLC: 7.4 % (ref 4.8–5.6)

## 2019-01-04 RX ORDER — AMOXICILLIN 500 MG/1
500 CAPSULE ORAL 3 TIMES DAILY
Qty: 21 CAP | Refills: 0 | Status: SHIPPED | OUTPATIENT
Start: 2019-01-04 | End: 2019-02-25

## 2019-01-04 RX ORDER — TADALAFIL 5 MG/1
5 TABLET ORAL DAILY
Qty: 30 TAB | Refills: 6 | Status: SHIPPED | OUTPATIENT
Start: 2019-01-04 | End: 2020-06-17

## 2019-01-04 NOTE — PROGRESS NOTES
Chief Complaint Patient presents with  Diabetes 5 month follow up  POC A1C 7.4%  Cholesterol Problem 5 month follow up  Hypertension 5 month follow up  Labs Non-fasting

## 2019-01-16 ENCOUNTER — HOSPITAL ENCOUNTER (OUTPATIENT)
Dept: CARDIAC REHAB | Age: 59
End: 2019-01-16
Attending: THORACIC SURGERY (CARDIOTHORACIC VASCULAR SURGERY)

## 2019-01-21 ENCOUNTER — APPOINTMENT (OUTPATIENT)
Dept: CARDIAC REHAB | Age: 59
End: 2019-01-21
Attending: THORACIC SURGERY (CARDIOTHORACIC VASCULAR SURGERY)

## 2019-01-23 ENCOUNTER — PATIENT OUTREACH (OUTPATIENT)
Dept: INTERNAL MEDICINE CLINIC | Age: 59
End: 2019-01-23

## 2019-01-23 ENCOUNTER — APPOINTMENT (OUTPATIENT)
Dept: CARDIAC REHAB | Age: 59
End: 2019-01-23
Attending: THORACIC SURGERY (CARDIOTHORACIC VASCULAR SURGERY)

## 2019-01-23 ENCOUNTER — TELEPHONE (OUTPATIENT)
Dept: INTERNAL MEDICINE CLINIC | Age: 59
End: 2019-01-23

## 2019-01-23 NOTE — TELEPHONE ENCOUNTER
Was asked by Dr. Lennox Catalan to see pt soon for uncontrolled diabetes. Called pt. He scheduled an appt with this CDE for diabetes self management education tomorrow at 11:00. Asked pt to bring his glucometer. Understanding and agreement was verbalized. Nasal mucosa clear.  Mouth with normal mucosa  Throat has no vesicles, no oropharyngeal exudates and uvula is midline.

## 2019-01-23 NOTE — TELEPHONE ENCOUNTER
Patient's daughter, Anna Lee needs a call back in reference to getting an acute appt asap for patient is having elevated Blood Sugar x 1 week. Please call,.  Thank you

## 2019-01-23 NOTE — PROGRESS NOTES
Was asked by Dr. Pratik Curtis to see pt soon for uncontrolled diabetes. Called pt. He scheduled an appt with this CDE for diabetes self management education tomorrow at 11:00. Asked pt to bring his glucometer. Understanding and agreement was verbalized. See telephone encounter dated today 1/23/19.

## 2019-01-23 NOTE — TELEPHONE ENCOUNTER
Called and spoke with pt's daughter, Jayda Ferguson ([facility]). Jayda Ferguson stated pt's BS has been running in 260-290's. Pt checked BS at 7am this morning and it was 296 (fasting). Pt checked again around 0930 and BS is back down to 196. Jayda Ferguson stated that pt's BS typically run 160-190. Pt has been c/o headaches and feeling lousy. Jaydafederico Zuritas stated nothing in pt's routine has changed, pt is taking all medications as prescribed. Notified Tanya I would send message to Dr. Parker Catalan and call her back with his recommendations. add short course of steroid iv , cont nebul therapy   oxygen supplement as needed

## 2019-01-23 NOTE — TELEPHONE ENCOUNTER
Shayla Grande MD   You Just now (11:17 AM)     See if ptatient can see me now (Routing comment)      Called, spoke to pt. Two identifiers confirmed. Offered pt an appointment to see Dr. Leo Moser today. Pt declined stating he is in [place] in a meeting for work. Notified pt I would let Dr. Leo Moser know. Pt verbalized understanding of information discussed w/ no further questions at this time.

## 2019-01-24 ENCOUNTER — OFFICE VISIT (OUTPATIENT)
Dept: INTERNAL MEDICINE CLINIC | Age: 59
End: 2019-01-24

## 2019-01-24 ENCOUNTER — PATIENT OUTREACH (OUTPATIENT)
Dept: INTERNAL MEDICINE CLINIC | Age: 59
End: 2019-01-24

## 2019-01-24 DIAGNOSIS — E11.00 TYPE 2 DIABETES MELLITUS WITH HYPEROSMOLARITY WITHOUT COMA, WITHOUT LONG-TERM CURRENT USE OF INSULIN (HCC): Primary | ICD-10-CM

## 2019-01-24 NOTE — PROGRESS NOTES
Was asked by Dr. Akin Corey to see patient for diabetes self management education. Last A1c was 7.4% on 1/4/19. Previous A1c was 7.0% on 10/4/18. Previous diabetes education pre and post CABG x4 October 2018. Pt's daughter called yesterday stating his blood sugars have gone up recently in th [id number] range. 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    History/Family History - found out he had diabetes about three years ago; his mom had diabetes    Symptoms of high blood sugar- fatigue, increased thirst, tingling bottom of feet    Motivation - worried about high blood sugars and wants to get better    Self Care Behaviors- eats out breakfast and lunch; cooks dinner     1) Healthy Eating/Food Recall-   BK - 8-9 am - omelet w/tomatoes, peppers, onions, steak, 2 toast w/butter, regular Coke  LN - noon-2 pm- steak and tossed salad (tomatoes, onion, cucumbers, <1/4 cup Ranch or [place] drsg), with or without small baked white or sweet potato  DN - 6-7 pm- hamburger glenna, or steak, or grilled fish, w/steamed non starchy veggies like broccoli or cauliflower; Wednesdays-wings and 5 [employer]  SN - midafternoon-grapes, blackberries, blueberries  DR - Wednesday evenings-5 [employer]; water, [employer]; uses Equal or blue packet for artificial sweetener    2) Being Active- gets on elliptical 5-10 minutes every evening; is planning to get a treadmill    3) Self Monitoring Blood Glucose (SMBG)- checks fasting and another time or two during the day the past week due to hyperglycemia; results have been ranging 129-321 the past week with most in the 200s. Average is 232. This morning was 174. How to treat a low blood sugar - n/a    4) Taking Medication- takes metformin 1000 mg twice daily and Onglyza 5 mg daily; Geraldean Clos was added today by Dr. Akin Corey; if not effective may consider GLP-1.  Pt was interested in JAYUNDER-CIRO because his friend takes it and his blood sugars came down nicely    Key Antihyperglycemic Medications             empagliflozin (JARDIANCE) 10 mg tablet  (Taking) Take 1 Tab by mouth daily. metFORMIN (GLUCOPHAGE) 1,000 mg tablet Take 1 Tab by mouth two (2) times daily (with meals). sAXagliptin (ONGLYZA) 5 mg tab tablet Take 1 Tab by mouth daily. 5) Problem Solving- pt is willing to learn how to mange his high blood sugars by making adjustments in his treatment plan. 6) Reducing Risk-   Tobacco - former  HTN - takes medication  HLD - takes medication  Aspirin - takes    Discussed possible complications of uncontrolled diabetes. 7) Healthy Coping-   Support system - pt's daughter Gudelia Diallo as well as his other daughter who is an RN are very supportive; pt will take advantage of the support and education provided today and of the support of his health care team.    Plan / Pt Goals -   -continue to check fasting blood sugar, and add a check before bed; record in log book provided today  -call Linda Dave at [phone] in one week with blood sugars  -continue metformin and Onglyza as before and add Jardiance in the morning, which was sent to your pharmacy; report increased urination or symptoms of UTI  -continue eating a healthy meal keeping in mind what is a carb and portion size; consider eliminating regular Coke  -increase exercise to 15 minutes daily as tolerated; pt is considering getting a treadmill; guideline is 150 minutes of moderate aerobic exercise weekly  -follow up with Dr. Joyce Alvarenga in one month  -call Linda Acosta at [phone] with any questions and in one week with blood sugar results    Future Appointments   Date Time Provider Mary Mccallum   2/25/2019  8:15 AM MD Akshat [address]   6/4/2019  8:45 AM MD Akshat [address]      Last Appointment My Department:  1/4/2019  Visit discussed with Dr. Joyce Alvarenga.     Pancho Price, RN, CDE, CCM  (Phone) [phone]

## 2019-01-24 NOTE — PATIENT INSTRUCTIONS
Goals/plan:  -continue to check fasting blood sugar, and before bed and record in log book provided today  -call [employer] at [phone] in one week with blood sugars  -continue metformin and Onglyza as before and add Jardiance in the morning, which was sent to your pharmacy  -continue eating a healthy meal keeping in mind what is a carb and portion size; consider eliminating regular Coke  -increase exercise to 15 minutes daily as tolerated; you are considering getting a treadmill; guideline is 150 minutes of moderate aerobic exercise weekly  -follow up with Dr. Pratik Curtis in one month  -call [employer] at [phone] with any questions and in one week with blood sugar results    For your knowledge, diabetes affects many of your organs in a negative way. Keeping your blood sugar low, keeps these effects at bay! Ask us how we can help you reach your diabetes goals.

## 2019-01-24 NOTE — PROGRESS NOTES
Was asked by Dr. Arlin Newman to see patient for diabetes self management education. Last A1c was 7.4% on 1/4/19. Previous A1c was 7.0% on 10/4/18. Previous diabetes education pre and post CABG x4 October 2018. Pt's daughter called yesterday stating his blood sugars have gone up recently in th [id number] range. 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  
BK - 8-9 am - omelet w/tomatoes, peppers, onions, steak, 2 toast w/butter, regular Coke LN - noon-2 pm- steak and tossed salad (tomatoes, onion, cucumbers, <1/4 cup [place] or [place] drsg), with or without small baked white or sweet potato DN - 6-7 pm- hamburger glenna, or steak, or grilled fish, w/steamed non starchy veggies like broccoli or cauliflower; Wednesdays-wings and 5 [employer] SN - midafternoon-grapes, blackberries, blueberries DR - Wednesday evenings-5 [employer]; water, Coke; uses Equal or blue packet for artificial sweetener 2) Being Active- gets on elliptical 5-10 minutes every evening; is planning to get a treadmill 3) Self Monitoring Blood Glucose (SMBG)- checks fasting and another time or two during the day the past week due to hyperglycemia; results have been ranging 129-321 the past week with most in the 200s. Average is 232. This morning was 174.  
 
How to treat a low blood sugar - n/a 
 
4) Taking Medication- takes metformin 1000 mg twice daily and Onglyza 5 mg daily; Hussein Pillow was added today by Dr. Arlin Newman; if not effective may consider GLP-1. Pt was interested in JAY-UNDER-CIRO because his friend takes it and his blood sugars came down nicely Key Antihyperglycemic Medications   
    
  
 empagliflozin (JARDIANCE) 10 mg tablet  (Taking) Take 1 Tab by mouth daily. metFORMIN (GLUCOPHAGE) 1,000 mg tablet Take 1 Tab by mouth two (2) times daily (with meals). sAXagliptin (ONGLYZA) 5 mg tab tablet Take 1 Tab by mouth daily. 5) Problem Solving- pt is willing to learn how to mange his high blood sugars by making adjustments in his treatment plan. 6) Reducing Risk-  
Tobacco - former HTN - takes medication HLD - takes medication Aspirin - takes Discussed possible complications of uncontrolled diabetes. 7) Healthy Coping-  
Support system - pt's daughter Jayda Ferguson as well as his other daughter who is an RN are very supportive; pt will take advantage of the support and education provided today and of the support of his health care team. 
 
Plan / Pt Goals -  
-continue to check fasting blood sugar, and add a check before bed; record in log book provided today 
-call Justin Taylor at [phone] in one week with blood sugars 
-continue metformin and Onglyza as before and add Jardiance in the morning, which was sent to your pharmacy; report increased urination or symptoms of UTI 
-continue eating a healthy meal keeping in mind what is a carb and portion size; consider eliminating regular Coke 
-increase exercise to 15 minutes daily as tolerated; pt is considering getting a treadmill; guideline is 150 minutes of moderate aerobic exercise weekly 
-follow up with Dr. Parker Catalan in one month 
-call Justin Taylor at [phone] with any questions and in one week with blood sugar results Future Appointments Date Time Provider Mary Mccallum 2/25/2019  8:15 AM MD Akshat Urr[address]  
6/4/2019  8:45 AM MD Akshat [address] Last Appointment My Department: 
1/4/2019 Visit discussed with Dr. Parker Catalan.  
 
Jessica Drake, RN, CDE, CCM 
 ([facility]) [phone]

## 2019-01-27 ENCOUNTER — PATIENT OUTREACH (OUTPATIENT)
Dept: INTERNAL MEDICINE CLINIC | Age: 59
End: 2019-01-27

## 2019-01-27 NOTE — PROGRESS NOTES
Patient has graduated from the Transitions of Care Coordination  program on 1/27/2019. 
 
- To the best of this NN's knowledge, this patient had no additional ED visits or Hospital Admissions during 30 day NAA period following admission to [facility] 10/8/18-10/12/18.  
- NAA period has ended. Goal Completed. Episode Resolved. Goals Addressed This Visit's Progress Post Hospitalization  COMPLETED: Attends follow-up appointments as directed. On track 10/15/2018  
- to follow-up with [facility] 10/19/18 
- has office visit scheduled with Dr. Rosa Marie (cardiothoracic surgery) 10/19/18 
- has office visit scheduled with Dr. Dior Daily 10/25/18 
- to begin OP Cardiac Rehab 11/5/18 
- patient to schedule follow-up with Dr. Byron Wick (Cardiology) per Dr. Gertrude Lares recommendation 1/27/2019  
- attended appointment with [address] 10/19/18 
- attended office visit with Dr. Rosa Marie 10/18/18 and 11/7/18 
- attended office visit with Dr. Dior Daily 10/25/18 
- began OP Cardiac Rehab 11/5/18 Future Appointments: 
Future Appointments Date Time Provider Mary Mccallum 2/25/2019  8:15 AM Jenn Lepe MD [address]  
6/4/2019  8:45 AM MD Diane [address] Last Appointment With Me: 
Visit date not found Last Appointment My Department: 
1/24/2019

## 2019-01-28 ENCOUNTER — APPOINTMENT (OUTPATIENT)
Dept: CARDIAC REHAB | Age: 59
End: 2019-01-28
Attending: THORACIC SURGERY (CARDIOTHORACIC VASCULAR SURGERY)

## 2019-01-30 ENCOUNTER — APPOINTMENT (OUTPATIENT)
Dept: CARDIAC REHAB | Age: 59
End: 2019-01-30
Attending: THORACIC SURGERY (CARDIOTHORACIC VASCULAR SURGERY)

## 2019-02-04 ENCOUNTER — APPOINTMENT (OUTPATIENT)
Dept: CARDIAC REHAB | Age: 59
End: 2019-02-04
Attending: THORACIC SURGERY (CARDIOTHORACIC VASCULAR SURGERY)

## 2019-02-06 ENCOUNTER — APPOINTMENT (OUTPATIENT)
Dept: CARDIAC REHAB | Age: 59
End: 2019-02-06
Attending: THORACIC SURGERY (CARDIOTHORACIC VASCULAR SURGERY)

## 2019-02-11 ENCOUNTER — APPOINTMENT (OUTPATIENT)
Dept: CARDIAC REHAB | Age: 59
End: 2019-02-11
Attending: THORACIC SURGERY (CARDIOTHORACIC VASCULAR SURGERY)

## 2019-02-13 ENCOUNTER — APPOINTMENT (OUTPATIENT)
Dept: CARDIAC REHAB | Age: 59
End: 2019-02-13
Attending: THORACIC SURGERY (CARDIOTHORACIC VASCULAR SURGERY)

## 2019-02-18 ENCOUNTER — APPOINTMENT (OUTPATIENT)
Dept: CARDIAC REHAB | Age: 59
End: 2019-02-18
Attending: THORACIC SURGERY (CARDIOTHORACIC VASCULAR SURGERY)

## 2019-02-20 ENCOUNTER — APPOINTMENT (OUTPATIENT)
Dept: CARDIAC REHAB | Age: 59
End: 2019-02-20
Attending: THORACIC SURGERY (CARDIOTHORACIC VASCULAR SURGERY)

## 2019-02-25 ENCOUNTER — OFFICE VISIT (OUTPATIENT)
Dept: INTERNAL MEDICINE CLINIC | Age: 59
End: 2019-02-25

## 2019-02-25 ENCOUNTER — TELEPHONE (OUTPATIENT)
Dept: INTERNAL MEDICINE CLINIC | Age: 59
End: 2019-02-25

## 2019-02-25 ENCOUNTER — APPOINTMENT (OUTPATIENT)
Dept: CARDIAC REHAB | Age: 59
End: 2019-02-25
Attending: THORACIC SURGERY (CARDIOTHORACIC VASCULAR SURGERY)

## 2019-02-25 VITALS
BODY MASS INDEX: 38.06 KG/M2 | OXYGEN SATURATION: 98 % | SYSTOLIC BLOOD PRESSURE: 152 MMHG | HEART RATE: 82 BPM | TEMPERATURE: 98 F | DIASTOLIC BLOOD PRESSURE: 94 MMHG | WEIGHT: 281 LBS | HEIGHT: 72 IN

## 2019-02-25 DIAGNOSIS — R68.83 CHILL: ICD-10-CM

## 2019-02-25 DIAGNOSIS — R05.9 COUGH: ICD-10-CM

## 2019-02-25 DIAGNOSIS — E11.65 UNCONTROLLED TYPE 2 DIABETES MELLITUS WITH HYPERGLYCEMIA (HCC): ICD-10-CM

## 2019-02-25 DIAGNOSIS — R52 GENERALIZED BODY ACHES: Primary | ICD-10-CM

## 2019-02-25 DIAGNOSIS — J40 BRONCHITIS: ICD-10-CM

## 2019-02-25 LAB
FLUAV+FLUBV AG NOSE QL IA.RAPID: NEGATIVE POS/NEG
FLUAV+FLUBV AG NOSE QL IA.RAPID: NEGATIVE POS/NEG
GLUCOSE POC: 153 MG/DL (ref 70–110)
VALID INTERNAL CONTROL?: YES

## 2019-02-25 RX ORDER — AZITHROMYCIN 250 MG/1
250 TABLET, FILM COATED ORAL SEE ADMIN INSTRUCTIONS
Qty: 6 TAB | Refills: 0 | Status: SHIPPED | OUTPATIENT
Start: 2019-02-25 | End: 2019-03-02

## 2019-02-25 NOTE — TELEPHONE ENCOUNTER
Spoke with patient after 2 patient identifiers being note and advised of appt on Monday, February 25, 2019 02:45 PM, patient accepted. Patient expressed understanding and has no further questions at this time.

## 2019-02-25 NOTE — PROGRESS NOTES
Chief Complaint Patient presents with  Cough  
  dry  x 4 days  Generalized Body Aches  
  x 4 days  Chills  
  x 4 days  Sweats  
  x 4 days

## 2019-02-25 NOTE — TELEPHONE ENCOUNTER
Patient's daughter, Rafaela Julien, requests a call back to get an acute appt today for patient with Bad croupy cough producing colored mucus. Please call to discuss.  Thank you

## 2019-02-25 NOTE — PROGRESS NOTES
HISTORY OF PRESENT ILLNESS Maryann Delvalle is a 63 y.o. male. HPI Pt c/o feeling sick for last 3 days Initially with sore throat and lightheadedness and chills Now with thick sinus congestion and productive cough 
 
fsbs at home often > 2000 despite add Jardiance last month Patient Active Problem List  
 Diagnosis Date Noted  Severe obesity ([address] 10/25/2018  CAD (coronary artery disease), native coronary artery 10/08/2018  S/P CABG x 4 10/08/2018  PAU on CPAP 02/01/2016  DM type 2 (diabetes mellitus, type 2) ([address] 10/19/2013  Obesity 03/08/2011  CAD (Coronary Artery Disease) - stent 1999 10/20/2009  
 HTN (hypertension), benign 10/20/2009  Gout 10/20/2009  Hypercholesteremia 10/20/2009  Osteoarthritis of knee 10/20/2009 Current Outpatient Medications Medication Sig Dispense Refill  azithromycin (ZITHROMAX) 250 mg tablet Take 1 Tab by mouth See Admin Instructions for 5 days. 6 Tab 0  
 sAXagliptin (ONGLYZA) 5 mg tab tablet Take 1 Tab by mouth daily. 30 Tab 11  
 empagliflozin (JARDIANCE) 10 mg tablet Take 1 Tab by mouth daily. 30 Tab 11  
 tadalafil (CIALIS) 5 mg tablet Take 1 Tab by mouth daily. 30 Tab 6  
 metFORMIN (GLUCOPHAGE) 1,000 mg tablet Take 1 Tab by mouth two (2) times daily (with meals). 60 Tab 6  
 amLODIPine (NORVASC) 5 mg tablet Take 1 Tab by mouth daily for 180 days. 30 Tab 5  
 ascorbic acid, vitamin C, (VITAMIN C) 1,000 mg tablet Take 1 Tab by mouth daily for 180 days. 30 Tab 5  
 glucose blood VI test strips (ONETOUCH VERIO) strip Twice daily 100 Strip 5  
 lancets (ONETOUCH DELICA LANCETS) 30 gauge misc Twice daily 100 Lancet 5  
 omeprazole (PRILOSEC) 20 mg capsule Take 1 Cap by mouth daily. 90 Cap 3  
 allopurinol (ZYLOPRIM) 300 mg tablet TAKE 1 TABLET BY MOUTH EVERY DAY 90 Tab 3  
 multivitamin (ONE A DAY) tablet Take 1 Tab by mouth daily.  atorvastatin (LIPITOR) 80 mg tablet Take 80 mg by mouth daily.  aspirin 81 mg Tab Take 81 mg by mouth daily. Not on File Lab Results Component Value Date/Time Hemoglobin A1c 7.0 (H) 10/04/2018 02:12 PM  
 Hemoglobin A1c 7.2 (H) 10/02/2018 10:38 AM  
 Hemoglobin A1c 7.0 (H) 09/26/2017 10:11 AM  
 Glucose 136 (H) 10/11/2018 04:18 AM  
 Glucose (POC) 135 (H) 10/12/2018 07:14 AM  
 Glucose  (A) 02/25/2019 03:45 PM  
 Microalb/Creat ratio (ug/mg creat.) 5.2 03/26/2018 09:50 AM  
 LDL, calculated 53 03/26/2018 09:50 AM  
 Creatinine 0.81 10/11/2018 04:18 AM  
  
Lab Results Component Value Date/Time Cholesterol, total 152 03/26/2018 09:50 AM  
 Cholesterol (POC) 163 10/04/2011 08:23 AM  
 HDL Cholesterol 35 (L) 03/26/2018 09:50 AM  
 LDL, calculated 53 03/26/2018 09:50 AM  
 LDL Cholesterol (POC) N/A 10/04/2011 08:23 AM  
 Triglyceride 318 (H) 03/26/2018 09:50 AM  
 Triglycerides (POC) 596 10/04/2011 08:23 AM  
 
Lab Results Component Value Date/Time GFR est non-AA >60 10/11/2018 04:18 AM  
 GFR est AA >60 10/11/2018 04:18 AM  
 Creatinine 0.81 10/11/2018 04:18 AM  
 BUN 18 10/11/2018 04:18 AM  
 Sodium 136 10/11/2018 04:18 AM  
 Potassium 4.2 10/11/2018 04:18 AM  
 Chloride 105 10/11/2018 04:18 AM  
 CO2 24 10/11/2018 04:18 AM  
 Magnesium 2.3 10/11/2018 04:18 AM  
  
ROS Physical Exam  
Constitutional: He appears well-developed and well-nourished. No distress. Appears stated age HENT:  
Head: Normocephalic. Cardiovascular: Normal rate, regular rhythm and normal heart sounds. Exam reveals no gallop and no friction rub. No murmur heard. Pulmonary/Chest: Effort normal and breath sounds normal. No respiratory distress. He has no wheezes. He has no rales. He exhibits no tenderness. Abdominal: Soft. Musculoskeletal: He exhibits no edema. Neurological: He is alert. Psychiatric: He has a normal mood and affect. Nursing note and vitals reviewed. ASSESSMENT and PLAN Diagnoses and all orders for this visit: 
 
 1. Generalized body aches -     AMB POC HUGH INFLUENZA A/B TEST 2. Chill -     AMB POC HUGH INFLUENZA A/B TEST-negative 3. Cough -     AMB POC HUGH INFLUENZA A/B TEST 4. Bronchitis 
 zpak 
 mucinex 5. Uncontrolled type 2 diabetes mellitus with hyperglycemia ([address] -     AMB POC GLUCOSE BLOOD, BY GLUCOSE MONITORING DEVICE 153 2hrs post lunch Continue medicines and diet a1c next OV in 2 months Other orders 
-     azithromycin (ZITHROMAX) 250 mg tablet; Take 1 Tab by mouth See Admin Instructions for 5 days. -     sAXagliptin (ONGLYZA) 5 mg tab tablet; Take 1 Tab by mouth daily. Follow-up Disposition: 
Return in about 1 month (around 3/25/2019) for dm-02.

## 2019-02-27 ENCOUNTER — APPOINTMENT (OUTPATIENT)
Dept: CARDIAC REHAB | Age: 59
End: 2019-02-27
Attending: THORACIC SURGERY (CARDIOTHORACIC VASCULAR SURGERY)

## 2019-03-01 ENCOUNTER — PATIENT OUTREACH (OUTPATIENT)
Dept: INTERNAL MEDICINE CLINIC | Age: 59
End: 2019-03-01

## 2019-03-01 NOTE — PROGRESS NOTES
Chart reviewed. Reached out to pt regarding follow up of diabetes self management. Pt stated his machine is at home, but this morning his blood sugar was 160. Monday in Dr. Ron No office for a sick visit it was 150. Pt stated he is taking Jardiance and metformin. Dr. Abi Osman will check A1c at regular f/u in two months. Pt stated he is walking and being mindful of carbs. Pt was appreciative of phone call and will call with any questions before his next appt. Goals  Patient verbalizes understanding of self -management goals of living with Diabetes. 1/24/19-DKW -continue to check fasting blood sugar, and add a check before bed; record in log book provided today 
-call Marlen El at [phone] in one week with blood sugars 
-continue metformin and Onglyza as before and add Jardiance in the morning, which was sent to your pharmacy; report increased urination or symptoms of UTI 
-continue eating a healthy meal keeping in mind what is a carb and portion size; consider eliminating regular Coke 
-increase exercise to 15 minutes daily as tolerated; you are considering getting a treadmill; guideline is 150 minutes of moderate aerobic exercise weekly 
-follow up with Dr. Abi Osman in one month 
-call Marlen El at [phone] with any questions and in one week with blood sugar results Key Antihyperglycemic Medications sAXagliptin (ONGLYZA) 5 mg tab tablet Take 1 Tab by mouth daily. empagliflozin (JARDIANCE) 10 mg tablet Take 1 Tab by mouth daily. metFORMIN (GLUCOPHAGE) 1,000 mg tablet Take 1 Tab by mouth two (2) times daily (with meals).

## 2019-03-04 ENCOUNTER — APPOINTMENT (OUTPATIENT)
Dept: CARDIAC REHAB | Age: 59
End: 2019-03-04
Attending: THORACIC SURGERY (CARDIOTHORACIC VASCULAR SURGERY)

## 2019-03-06 ENCOUNTER — APPOINTMENT (OUTPATIENT)
Dept: CARDIAC REHAB | Age: 59
End: 2019-03-06
Attending: THORACIC SURGERY (CARDIOTHORACIC VASCULAR SURGERY)

## 2019-04-18 RX ORDER — ALLOPURINOL 300 MG/1
TABLET ORAL
Qty: 90 TAB | Refills: 3 | Status: SHIPPED | OUTPATIENT
Start: 2019-04-18 | End: 2020-04-05

## 2019-04-18 RX ORDER — LOSARTAN POTASSIUM 50 MG/1
TABLET ORAL
Qty: 90 TAB | Refills: 3 | Status: SHIPPED | OUTPATIENT
Start: 2019-04-18 | End: 2020-11-16 | Stop reason: DRUGHIGH

## 2019-04-18 RX ORDER — OMEPRAZOLE 20 MG/1
20 CAPSULE, DELAYED RELEASE ORAL DAILY
Qty: 90 CAP | Refills: 3 | Status: SHIPPED | OUTPATIENT
Start: 2019-04-18 | End: 2020-04-05

## 2019-04-21 NOTE — PROGRESS NOTES
HISTORY OF PRESENT ILLNESS  Juan Miguel Skinner Sr is a 63 y.o. male. HPI        F/u dm-2 htn hld CAD hx gout obesity  Last a1c 7.4 LDL 53  a1c today in 6.4  Did not start ozempic  Weight down 7 more lbs   On a good diet, working , walking  Did not take ozempic  Had some FSBS > 200 reported last OV  Due for foot exam.  fsbs 130's in morning    S/p 4v cabg last year      Last OV  S/p 4v cabg last year-feels well, no cp sob sxs  Lost 20 lbs but gained all back and more since last OV  Last a1c 7.0 LDL 53  C/o thick congestion x 3d -no f/c cough  a1c today 7.4  fsbs around 170 fasting  No recent gout  Request refill of cialis              Patient Active Problem List    Diagnosis Date Noted    Severe obesity ([facility] [address] 10/25/2018    CAD (coronary artery disease), native coronary artery 10/08/2018    S/P CABG x 4 10/08/2018    PAU on CPAP 02/01/2016    DM type 2 (diabetes mellitus, type 2) ([facility] [address] 10/19/2013    Obesity 03/08/2011    CAD (Coronary Artery Disease) - stent 1999 10/20/2009    HTN (hypertension), benign 10/20/2009    Gout 10/20/2009    Hypercholesteremia 10/20/2009    Osteoarthritis of knee 10/20/2009     Current Outpatient Medications   Medication Sig Dispense Refill    losartan (COZAAR) 50 mg tablet TAKE 1 TABLET EVERY DAY 90 Tab 3    allopurinol (ZYLOPRIM) 300 mg tablet TAKE 1 TABLET BY MOUTH EVERY DAY 90 Tab 3    omeprazole (PRILOSEC) 20 mg capsule TAKE 1 CAP BY MOUTH DAILY. 90 Cap 3    sAXagliptin (ONGLYZA) 5 mg tab tablet Take 1 Tab by mouth daily. 30 Tab 11    empagliflozin (JARDIANCE) 10 mg tablet Take 1 Tab by mouth daily. 30 Tab 11    tadalafil (CIALIS) 5 mg tablet Take 1 Tab by mouth daily. 30 Tab 6    metFORMIN (GLUCOPHAGE) 1,000 mg tablet Take 1 Tab by mouth two (2) times daily (with meals).  60 Tab 6    glucose blood VI test strips (ONETOUCH VERIO) strip Twice daily 100 Strip 5    lancets (ONETOUCH DELICA LANCETS) 30 gauge misc Twice daily 100 Lancet 5    multivitamin (ONE A DAY) tablet Take 1 Tab by mouth daily.  atorvastatin (LIPITOR) 80 mg tablet Take 80 mg by mouth daily.  aspirin 81 mg Tab Take 81 mg by mouth daily. Not on File   Lab Results   Component Value Date/Time    Hemoglobin A1c 7.0 (H) 10/04/2018 02:12 PM    Hemoglobin A1c 7.2 (H) 10/02/2018 10:38 AM    Hemoglobin A1c 7.0 (H) 09/26/2017 10:11 AM    Glucose 136 (H) 10/11/2018 04:18 AM    Glucose (POC) 135 (H) 10/12/2018 07:14 AM    Glucose  (A) 02/25/2019 03:45 PM    Microalb/Creat ratio (ug/mg creat.) 5.2 03/26/2018 09:50 AM    LDL, calculated 53 03/26/2018 09:50 AM    Creatinine 0.81 10/11/2018 04:18 AM      Lab Results   Component Value Date/Time    Cholesterol, total 152 03/26/2018 09:50 AM    Cholesterol (POC) 163 10/04/2011 08:23 AM    HDL Cholesterol 35 (L) 03/26/2018 09:50 AM    LDL, calculated 53 03/26/2018 09:50 AM    LDL Cholesterol (POC) N/A 10/04/2011 08:23 AM    Triglyceride 318 (H) 03/26/2018 09:50 AM    Triglycerides (POC) 596 10/04/2011 08:23 AM     Lab Results   Component Value Date/Time    GFR est non-AA >60 10/11/2018 04:18 AM    GFR est AA >60 10/11/2018 04:18 AM    Creatinine 0.81 10/11/2018 04:18 AM    BUN 18 10/11/2018 04:18 AM    Sodium 136 10/11/2018 04:18 AM    Potassium 4.2 10/11/2018 04:18 AM    Chloride 105 10/11/2018 04:18 AM    CO2 24 10/11/2018 04:18 AM    Magnesium 2.3 10/11/2018 04:18 AM        ROS    Physical Exam   Constitutional: He appears well-developed and well-nourished. No distress. Appears stated age, obese   HENT:   Head: Normocephalic. Cardiovascular: Normal rate, regular rhythm and normal heart sounds. Exam reveals no gallop and no friction rub. Pulmonary/Chest: Effort normal and breath sounds normal. He has no wheezes. He has no rales. He exhibits no tenderness. Abdominal: Soft. He exhibits no distension and no mass. There is no tenderness. There is no rebound and no guarding. Musculoskeletal: He exhibits no edema. Neurological: He is alert. Diabetic foot exam performed by Uriel Damon MD       Measurement  Response Nurse Comment Physician Comment  Monofilament  R - normal sensation with micro filament  L - normal sensation with micro filament    Pulse DP R - 2+ (normal)  L - 2+ (normal)    Pulse TP R - 2+ (normal)  L - 2+ (normal)    Structural deformity R - None  L - None    Skin Integrity / Deformity R - None  L - None       Reviewed by:         Psychiatric: He has a normal mood and affect. Nursing note and vitals reviewed. ASSESSMENT and PLAN  Diagnoses and all orders for this visit:    1. Uncontrolled type 2 diabetes mellitus with hyperglycemia (HCC)  -     AMB POC HEMOGLOBIN A1C 6.4  -     MICROALBUMIN, UR, RAND W/ MICROALB/CREAT RATIO  -      DIABETES FOOT EXAM   Improved control with diet changes   ozempic not needed  2. Essential hypertension  -     METABOLIC PANEL, COMPREHENSIVE   Reasonable control  3. Pure hypercholesterolemia  -     METABOLIC PANEL, COMPREHENSIVE  -     LIPID PANEL   Continue statin  4. Coronary artery disease involving native coronary artery of native heart without angina pectoris  -     METABOLIC PANEL, COMPREHENSIVE   S/p cabg last year-no cp or angina, f/u cardiologist  5. Prostate cancer screening  -     PSA W/ REFLX FREE PSA      Follow-up and Dispositions    · Return in about 4 months (around 8/22/2019) for dm-2 ht hld.

## 2019-04-22 ENCOUNTER — OFFICE VISIT (OUTPATIENT)
Dept: INTERNAL MEDICINE CLINIC | Age: 59
End: 2019-04-22

## 2019-04-22 VITALS
WEIGHT: 274.4 LBS | RESPIRATION RATE: 16 BRPM | HEART RATE: 71 BPM | DIASTOLIC BLOOD PRESSURE: 84 MMHG | TEMPERATURE: 97.8 F | BODY MASS INDEX: 37.17 KG/M2 | OXYGEN SATURATION: 97 % | SYSTOLIC BLOOD PRESSURE: 142 MMHG | HEIGHT: 72 IN

## 2019-04-22 DIAGNOSIS — I25.10 CORONARY ARTERY DISEASE INVOLVING NATIVE CORONARY ARTERY OF NATIVE HEART WITHOUT ANGINA PECTORIS: ICD-10-CM

## 2019-04-22 DIAGNOSIS — I10 ESSENTIAL HYPERTENSION: ICD-10-CM

## 2019-04-22 DIAGNOSIS — Z23 ENCOUNTER FOR IMMUNIZATION: ICD-10-CM

## 2019-04-22 DIAGNOSIS — E78.00 PURE HYPERCHOLESTEROLEMIA: ICD-10-CM

## 2019-04-22 DIAGNOSIS — Z12.5 PROSTATE CANCER SCREENING: ICD-10-CM

## 2019-04-22 DIAGNOSIS — E11.65 UNCONTROLLED TYPE 2 DIABETES MELLITUS WITH HYPERGLYCEMIA (HCC): Primary | ICD-10-CM

## 2019-04-22 LAB — HBA1C MFR BLD HPLC: 6.4 %

## 2019-04-22 NOTE — PROGRESS NOTES
Chief Complaint   Patient presents with    Hypertension     2 month f/u    Diabetes       1. Have you been to the ER, urgent care clinic since your last visit? Hospitalized since your last visit? no    2. Have you seen or consulted any other health care providers outside of the [facility] since your last visit? Include any pap smears or colon screening.   no

## 2019-04-23 ENCOUNTER — PATIENT OUTREACH (OUTPATIENT)
Dept: INTERNAL MEDICINE CLINIC | Age: 59
End: 2019-04-23

## 2019-04-23 LAB
ALBUMIN SERPL-MCNC: 4.6 G/DL (ref 3.5–5.5)
ALBUMIN/CREAT UR: 7.7 MG/G CREAT (ref 0–30)
ALBUMIN/GLOB SERPL: 2.1 {RATIO} (ref 1.2–2.2)
ALP SERPL-CCNC: 105 IU/L (ref 39–117)
ALT SERPL-CCNC: 25 IU/L (ref 0–44)
AST SERPL-CCNC: 15 IU/L (ref 0–40)
BILIRUB SERPL-MCNC: 0.4 MG/DL (ref 0–1.2)
BUN SERPL-MCNC: 11 MG/DL (ref 6–24)
BUN/CREAT SERPL: 12 (ref 9–20)
CALCIUM SERPL-MCNC: 9.5 MG/DL (ref 8.7–10.2)
CHLORIDE SERPL-SCNC: 105 MMOL/L (ref 96–106)
CHOLEST SERPL-MCNC: 150 MG/DL (ref 100–199)
CO2 SERPL-SCNC: 24 MMOL/L (ref 20–29)
CREAT SERPL-MCNC: 0.89 MG/DL (ref 0.76–1.27)
CREAT UR-MCNC: 71.3 MG/DL
GLOBULIN SER CALC-MCNC: 2.2 G/DL (ref 1.5–4.5)
GLUCOSE SERPL-MCNC: 114 MG/DL (ref 65–99)
HDLC SERPL-MCNC: 38 MG/DL
LDLC SERPL CALC-MCNC: 64 MG/DL (ref 0–99)
MICROALBUMIN UR-MCNC: 5.5 UG/ML
POTASSIUM SERPL-SCNC: 4.8 MMOL/L (ref 3.5–5.2)
PROT SERPL-MCNC: 6.8 G/DL (ref 6–8.5)
PSA SERPL-MCNC: 2 NG/ML (ref 0–4)
REFLEX CRITERIA: NORMAL
SODIUM SERPL-SCNC: 144 MMOL/L (ref 134–144)
TRIGL SERPL-MCNC: 242 MG/DL (ref 0–149)
VLDLC SERPL CALC-MCNC: 48 MG/DL (ref 5–40)

## 2019-04-23 NOTE — PROGRESS NOTES
Patient has graduated from the Disease Specific Care Management  program on 4/23/19 for diabetes. Patient's symptoms are stable at this time. Patient/family has the ability to self-manage. Care management goals have been completed at this time. No further nurse navigator follow up scheduled. Goals Addressed This Visit's Progress  COMPLETED: Patient verbalizes understanding of self -management goals of living with Diabetes. On track 4/23/19-DKW 
-A1c down to 6.4% on 4/22/19 
-disease specific care mgmt episode resolved at this time 3/1/19-DKW 
-blood sugars fasting around 150, 160 
-is taking his oral antidiabetic meds 
-has appt with Dr. Felipe Gauthier on April 22nd and will get A1c rechecked then 
-pt will call with any questions before next appt 
-will follow 1/24/19-DKW -continue to check fasting blood sugar, and add a check before bed; record in log book provided today 
-call Daniela Allred at [phone] in one week with blood sugars 
-continue metformin and Onglyza as before and add Jardiance in the morning, which was sent to your pharmacy; report increased urination or symptoms of UTI 
-continue eating a healthy meal keeping in mind what is a carb and portion size; consider eliminating regular Coke 
-increase exercise to 15 minutes daily as tolerated; you are considering getting a treadmill; guideline is 150 minutes of moderate aerobic exercise weekly 
-follow up with Dr. Felipe Gauthier in one month 
-call Daniela Allred at [phone] with any questions and in one week with blood sugar results Pt has nurse navigator's contact information for any further questions, concerns, or needs. Patients upcoming visits:   
Future Appointments Date Time Provider Mary Mccallum 8/22/2019  9:00 AM Gigi Harrell MD [facility]

## 2019-06-12 DIAGNOSIS — E11.00 TYPE 2 DIABETES MELLITUS WITH HYPEROSMOLARITY WITHOUT COMA, WITHOUT LONG-TERM CURRENT USE OF INSULIN (HCC): ICD-10-CM

## 2019-06-12 RX ORDER — METFORMIN HYDROCHLORIDE 1000 MG/1
TABLET ORAL
Qty: 60 TAB | Refills: 5 | Status: SHIPPED | OUTPATIENT
Start: 2019-06-12 | End: 2019-12-03 | Stop reason: SDUPTHER

## 2019-10-15 ENCOUNTER — OFFICE VISIT (OUTPATIENT)
Dept: INTERNAL MEDICINE CLINIC | Age: 59
End: 2019-10-15

## 2019-10-15 VITALS
BODY MASS INDEX: 37.38 KG/M2 | RESPIRATION RATE: 16 BRPM | TEMPERATURE: 97.9 F | SYSTOLIC BLOOD PRESSURE: 136 MMHG | OXYGEN SATURATION: 97 % | WEIGHT: 276 LBS | DIASTOLIC BLOOD PRESSURE: 84 MMHG | HEART RATE: 64 BPM | HEIGHT: 72 IN

## 2019-10-15 DIAGNOSIS — I25.10 CORONARY ARTERY DISEASE INVOLVING NATIVE CORONARY ARTERY OF NATIVE HEART WITHOUT ANGINA PECTORIS: ICD-10-CM

## 2019-10-15 DIAGNOSIS — E78.00 PURE HYPERCHOLESTEROLEMIA: ICD-10-CM

## 2019-10-15 DIAGNOSIS — Z23 ENCOUNTER FOR IMMUNIZATION: ICD-10-CM

## 2019-10-15 DIAGNOSIS — E11.9 CONTROLLED TYPE 2 DIABETES MELLITUS WITHOUT COMPLICATION, WITHOUT LONG-TERM CURRENT USE OF INSULIN (HCC): Primary | ICD-10-CM

## 2019-10-15 DIAGNOSIS — I10 ESSENTIAL HYPERTENSION: ICD-10-CM

## 2019-10-15 NOTE — PROGRESS NOTES
HISTORY OF PRESENT ILLNESS  Néstor Saini Sr is a 63 y.o. male. HPI      F/u dm-2 htn hld CAD hx gout obesity  Last a1c 6.4 LDL 64  Sees Dr Jina Sanchez q 6 months for f/u CAD--no chest pain  fsbs at home around 135-140 fasting  No gout episodes this year  Has not been as strict with diet   Works a lot and walks for exercise  No cp sob or neuropathy symptoms  Will need to see CT surgeon on 1 year follow up per pt    Last OV  Last a1c 7.4 LDL 53  a1c today in 6.4  Did not start ozempic  Weight down 7 more lbs   On a good diet, working , walking  Did not take ozempic  Had some FSBS > 200 reported last OV  Due for foot exam.  fsbs 130's in morning     S/p 4v cabg last year          Patient Active Problem List    Diagnosis Date Noted    Severe obesity ([facility] [address] 10/25/2018    CAD (coronary artery disease), native coronary artery 10/08/2018    S/P CABG x 4 10/08/2018    PAU on CPAP 02/01/2016    DM type 2 (diabetes mellitus, type 2) ([facility] [address] 10/19/2013    Obesity 03/08/2011    CAD (Coronary Artery Disease) - stent 1999 10/20/2009    HTN (hypertension), benign 10/20/2009    Gout 10/20/2009    Hypercholesteremia 10/20/2009    Osteoarthritis of knee 10/20/2009     Current Outpatient Medications   Medication Sig Dispense Refill    metFORMIN (GLUCOPHAGE) 1,000 mg tablet TAKE 1 TABLET BY MOUTH TWICE A DAY WITH MEALS 60 Tab 5    losartan (COZAAR) 50 mg tablet TAKE 1 TABLET EVERY DAY 90 Tab 3    allopurinol (ZYLOPRIM) 300 mg tablet TAKE 1 TABLET BY MOUTH EVERY DAY 90 Tab 3    omeprazole (PRILOSEC) 20 mg capsule TAKE 1 CAP BY MOUTH DAILY. 90 Cap 3    sAXagliptin (ONGLYZA) 5 mg tab tablet Take 1 Tab by mouth daily. 30 Tab 11    empagliflozin (JARDIANCE) 10 mg tablet Take 1 Tab by mouth daily. 30 Tab 11    tadalafil (CIALIS) 5 mg tablet Take 1 Tab by mouth daily.  30 Tab 6    glucose blood VI test strips (ONETOUCH VERIO) strip Twice daily 100 Strip 5    lancets (ONETOUCH DELICA LANCETS) 30 gauge misc Twice daily 100 Lancet 5    multivitamin (ONE A DAY) tablet Take 1 Tab by mouth daily.  atorvastatin (LIPITOR) 80 mg tablet Take 80 mg by mouth daily.  aspirin 81 mg Tab Take 81 mg by mouth daily. Not on File  Social History     Tobacco Use    Smoking status: Former Smoker     Last attempt to quit: 8/10/2018     Years since quittin.1    Smokeless tobacco: Never Used   Substance Use Topics    Alcohol use:  Yes     Alcohol/week: 5.0 standard drinks     Types: 5 Cans of beer per week     Frequency: Monthly or less     Drinks per session: 1 or 2     Comment: 5 a week      Lab Results   Component Value Date/Time    WBC 10.4 10/11/2018 04:18 AM    HGB 9.1 (L) 10/11/2018 04:18 AM    HCT 27.0 (L) 10/11/2018 04:18 AM    PLATELET 753 (L)  04:18 AM    MCV 87.7 10/11/2018 04:18 AM     Lab Results   Component Value Date/Time    Hemoglobin A1c 7.0 (H) 10/04/2018 02:12 PM    Hemoglobin A1c 7.2 (H) 10/02/2018 10:38 AM    Hemoglobin A1c 7.0 (H) 2017 10:11 AM    Glucose 114 (H) 2019 10:11 AM    Glucose (POC) 135 (H) 10/12/2018 07:14 AM    Glucose  (A) 2019 03:45 PM    Microalb/Creat ratio (ug/mg creat.) 7.7 2019 10:11 AM    LDL, calculated 64 2019 10:11 AM    Creatinine 0.89 2019 10:11 AM      Lab Results   Component Value Date/Time    Cholesterol, total 150 2019 10:11 AM    Cholesterol (POC) 163 10/04/2011 08:23 AM    HDL Cholesterol 38 (L) 2019 10:11 AM    LDL, calculated 64 2019 10:11 AM    LDL Cholesterol (POC) N/A 10/04/2011 08:23 AM    Triglyceride 242 (H) 2019 10:11 AM    Triglycerides (POC) 596 10/04/2011 08:23 AM     Lab Results   Component Value Date/Time    GFR est non-AA 95 2019 10:11 AM    GFR est  2019 10:11 AM    Creatinine 0.89 2019 10:11 AM    BUN 11 2019 10:11 AM    Sodium 144 2019 10:11 AM    Potassium 4.8 2019 10:11 AM    Chloride 105 2019 10:11 AM    CO2 24 2019 10:11 AM Magnesium 2.3 10/11/2018 04:18 AM        ROS    Physical Exam   Constitutional: He appears well-developed and well-nourished. No distress. Appears stated age, obese, nad   HENT:   Head: Normocephalic. Cardiovascular: Normal rate, regular rhythm and normal heart sounds. Exam reveals no gallop and no friction rub. No murmur heard. Pulmonary/Chest: Effort normal and breath sounds normal. No respiratory distress. He has no wheezes. He has no rales. He exhibits no tenderness. Abdominal: Soft. Musculoskeletal: He exhibits no edema. Neurological: He is alert. Psychiatric: He has a normal mood and affect. Nursing note and vitals reviewed. ASSESSMENT and PLAN  Diagnoses and all orders for this visit:    1. Controlled type 2 diabetes mellitus without complication, without long-term current use of insulin (HCC)  -     METABOLIC PANEL, COMPREHENSIVE  -     HEMOGLOBIN A1C WITH EAG   Advised lower calorie diet  2. Essential hypertension  -     CBC W/O DIFF  -     METABOLIC PANEL, COMPREHENSIVE   controlled  3. Pure hypercholesterolemia  -     METABOLIC PANEL, COMPREHENSIVE   On statin  4. Coronary artery disease involving native coronary artery of native heart without angina pectoris  -     LIPID PANEL   Will schedule f/u with CT surgeon   Dr Mynor Segundo q 6 motnhs   stable  5. Encounter for immunization  -     INFLUENZA VIRUS VAC QUAD,SPLIT,PRESV FREE SYRINGE IM      Follow-up and Dispositions    · Return in about 6 months (around 4/15/2020) for cad dm-2 htn hld gout.

## 2019-10-15 NOTE — PROGRESS NOTES
Chief Complaint   Patient presents with    Diabetes     4 month follow up    Hypertension     4 month follow up    Labs     Fasting    Cholesterol Problem     4 month follow up

## 2019-10-15 NOTE — PATIENT INSTRUCTIONS
Office Policies    Phone calls/patient messages:            Please allow up to 24 hours for someone in the office to contact you about your call or message. Be mindful your provider may be out of the office or your message may require further review. We encourage you to use [employer] for your messages as this is a faster, more efficient way to communicate with our office                         Medication Refills:            Prescription medications require 48-72 business hours to process. We encourage you to use [employer] for your refills. For controlled medications: Please allow 72 business hours to process. Certain medications may require you to  a written prescription at our office. NO narcotic/controlled medications will be prescribed after 4pm Monday through Friday or on weekends              Form/Paperwork Completion:            Please note a $25 fee may incur for all paperwork for completed by our providers. We ask that you allow 7-10 business days. Pre-payment is due prior to picking up/faxing the completed form. You may also download your forms to [employer] to have your doctor print off.

## 2019-10-16 LAB
ALBUMIN SERPL-MCNC: 4.6 G/DL (ref 3.5–5.5)
ALBUMIN/GLOB SERPL: 2.4 {RATIO} (ref 1.2–2.2)
ALP SERPL-CCNC: 101 IU/L (ref 39–117)
ALT SERPL-CCNC: 24 IU/L (ref 0–44)
AST SERPL-CCNC: 15 IU/L (ref 0–40)
BILIRUB SERPL-MCNC: 0.5 MG/DL (ref 0–1.2)
BUN SERPL-MCNC: 11 MG/DL (ref 6–24)
BUN/CREAT SERPL: 15 (ref 9–20)
CALCIUM SERPL-MCNC: 9.5 MG/DL (ref 8.7–10.2)
CHLORIDE SERPL-SCNC: 101 MMOL/L (ref 96–106)
CHOLEST SERPL-MCNC: 159 MG/DL (ref 100–199)
CO2 SERPL-SCNC: 21 MMOL/L (ref 20–29)
CREAT SERPL-MCNC: 0.71 MG/DL (ref 0.76–1.27)
ERYTHROCYTE [DISTWIDTH] IN BLOOD BY AUTOMATED COUNT: 13.4 % (ref 12.3–15.4)
EST. AVERAGE GLUCOSE BLD GHB EST-MCNC: 157 MG/DL
GLOBULIN SER CALC-MCNC: 1.9 G/DL (ref 1.5–4.5)
GLUCOSE SERPL-MCNC: 126 MG/DL (ref 65–99)
HBA1C MFR BLD: 7.1 % (ref 4.8–5.6)
HCT VFR BLD AUTO: 42.5 % (ref 37.5–51)
HDLC SERPL-MCNC: 38 MG/DL
HGB BLD-MCNC: 14.1 G/DL (ref 13–17.7)
LDLC SERPL CALC-MCNC: 72 MG/DL (ref 0–99)
MCH RBC QN AUTO: 27.8 PG (ref 26.6–33)
MCHC RBC AUTO-ENTMCNC: 33.2 G/DL (ref 31.5–35.7)
MCV RBC AUTO: 84 FL (ref 79–97)
PLATELET # BLD AUTO: 208 X10E3/UL (ref 150–450)
POTASSIUM SERPL-SCNC: 4.3 MMOL/L (ref 3.5–5.2)
PROT SERPL-MCNC: 6.5 G/DL (ref 6–8.5)
RBC # BLD AUTO: 5.07 X10E6/UL (ref 4.14–5.8)
SODIUM SERPL-SCNC: 140 MMOL/L (ref 134–144)
TRIGL SERPL-MCNC: 244 MG/DL (ref 0–149)
VLDLC SERPL CALC-MCNC: 49 MG/DL (ref 5–40)
WBC # BLD AUTO: 6 X10E3/UL (ref 3.4–10.8)

## 2019-11-11 DIAGNOSIS — E11.9 CONTROLLED TYPE 2 DIABETES MELLITUS WITHOUT COMPLICATION, WITHOUT LONG-TERM CURRENT USE OF INSULIN (HCC): Primary | ICD-10-CM

## 2019-12-03 DIAGNOSIS — E11.00 TYPE 2 DIABETES MELLITUS WITH HYPEROSMOLARITY WITHOUT COMA, WITHOUT LONG-TERM CURRENT USE OF INSULIN (HCC): ICD-10-CM

## 2019-12-03 RX ORDER — METFORMIN HYDROCHLORIDE 1000 MG/1
TABLET ORAL
Qty: 180 TAB | Refills: 1 | Status: SHIPPED | OUTPATIENT
Start: 2019-12-03 | End: 2020-06-06

## 2020-01-05 RX ORDER — EMPAGLIFLOZIN 10 MG/1
TABLET, FILM COATED ORAL
Qty: 30 TAB | Refills: 11 | Status: SHIPPED | OUTPATIENT
Start: 2020-01-05 | End: 2021-01-23

## 2020-01-16 ENCOUNTER — TELEPHONE (OUTPATIENT)
Dept: INTERNAL MEDICINE CLINIC | Age: 60
End: 2020-01-16

## 2020-01-16 NOTE — TELEPHONE ENCOUNTER
Marii Rosado MD  You Just now (10:34 AM)     1145 ro 12 today    Routing comment      Called and spoke with pt's daughter, Kyle Salamanca. Offered pt an appointment today @ [phone] with Dr. Veronica Porter. Kyle Salamanca stated pt is a work over an hour away. Recommended pt go to  d/t his work schedule. Tanya verbalized understanding of information discussed w/ no further questions at this time.

## 2020-01-16 NOTE — TELEPHONE ENCOUNTER
#[phone]  Daughter, Cj Givens states pt hit his head while putting a treadmill together. Pt's head is soft in that area and very sore to the touch. Pt needs to be seen as soon as possible today she is asking.

## 2020-02-06 RX ORDER — SAXAGLIPTIN 5 MG/1
TABLET, FILM COATED ORAL
Qty: 90 TAB | Refills: 3 | Status: SHIPPED | OUTPATIENT
Start: 2020-02-06 | End: 2021-01-21

## 2020-03-03 ENCOUNTER — OFFICE VISIT (OUTPATIENT)
Dept: INTERNAL MEDICINE CLINIC | Age: 60
End: 2020-03-03

## 2020-03-03 VITALS
SYSTOLIC BLOOD PRESSURE: 138 MMHG | BODY MASS INDEX: 38.06 KG/M2 | OXYGEN SATURATION: 98 % | TEMPERATURE: 98.1 F | HEIGHT: 72 IN | HEART RATE: 81 BPM | RESPIRATION RATE: 16 BRPM | WEIGHT: 281 LBS | DIASTOLIC BLOOD PRESSURE: 78 MMHG

## 2020-03-03 DIAGNOSIS — E11.8 CONTROLLED TYPE 2 DIABETES MELLITUS WITH COMPLICATION, WITHOUT LONG-TERM CURRENT USE OF INSULIN (HCC): ICD-10-CM

## 2020-03-03 DIAGNOSIS — Z12.5 PROSTATE CANCER SCREENING: ICD-10-CM

## 2020-03-03 DIAGNOSIS — I25.10 CORONARY ARTERY DISEASE INVOLVING NATIVE CORONARY ARTERY OF NATIVE HEART WITHOUT ANGINA PECTORIS: ICD-10-CM

## 2020-03-03 DIAGNOSIS — I10 ESSENTIAL HYPERTENSION: ICD-10-CM

## 2020-03-03 DIAGNOSIS — L91.8 INFLAMED SKIN TAG: ICD-10-CM

## 2020-03-03 DIAGNOSIS — E78.00 PURE HYPERCHOLESTEROLEMIA: ICD-10-CM

## 2020-03-03 DIAGNOSIS — N40.0 BENIGN PROSTATIC HYPERPLASIA WITHOUT LOWER URINARY TRACT SYMPTOMS: Primary | ICD-10-CM

## 2020-03-03 DIAGNOSIS — R97.20 INCREASED PROSTATE SPECIFIC ANTIGEN (PSA) VELOCITY: ICD-10-CM

## 2020-03-03 LAB — HBA1C MFR BLD HPLC: 7.8 % (ref 4.8–5.6)

## 2020-03-03 RX ORDER — CEPHALEXIN 500 MG/1
500 CAPSULE ORAL 3 TIMES DAILY
Qty: 21 CAP | Refills: 0 | Status: SHIPPED | OUTPATIENT
Start: 2020-03-03 | End: 2020-07-15 | Stop reason: ALTCHOICE

## 2020-03-03 NOTE — PROGRESS NOTES
HISTORY OF PRESENT ILLNESS  Tony Pritchett Sr is a 63 y.o. male. HPI       F/u dm-2 htn hld CAD s/p CAGB x4 2018 hx gout obesity   last a1c 7.1 LDL 72  amb a1c 7.8  Pt saw Dr Resendez Comment last month -f/u CAD-no med changes  fsbs at home <130 fasting  Some post meal fsbs 200-210 but most 170    Has infamed mass or skin tag below left axilla. Had been using dental floss as a tourniqout last few days . Appears inflamed now with some redness of chest wall    Last OV  Last a1c 6.4 LDL 64  Sees Dr Vita Gotti q 6 months for f/u CAD--no chest pain  fsbs at home around 135-140 fasting  No gout episodes this year  Has not been as strict with diet   Works a lot and walks for exercise  No cp sob or neuropathy symptoms  Will need to see CT surgeon on 1 year follow up per pt       Patient Active Problem List    Diagnosis Date Noted    Severe obesity ([facility] [address] 10/25/2018    CAD (coronary artery disease), native coronary artery 10/08/2018    S/P CABG x 4 10/08/2018    PAU on CPAP 02/01/2016    DM type 2 (diabetes mellitus, type 2) ([facility] [address] 10/19/2013    Obesity 03/08/2011    CAD (Coronary Artery Disease) - stent 1999 10/20/2009    HTN (hypertension), benign 10/20/2009    Gout 10/20/2009    Hypercholesteremia 10/20/2009    Osteoarthritis of knee 10/20/2009     Current Outpatient Medications   Medication Sig Dispense Refill    ONGLYZA 5 mg tab tablet TAKE 1 TABLET BY MOUTH EVERY DAY 90 Tab 3    JARDIANCE 10 mg tablet TAKE 1 TABLET BY MOUTH EVERY DAY 30 Tab 11    metFORMIN (GLUCOPHAGE) 1,000 mg tablet TAKE 1 TABLET BY MOUTH TWICE A DAY WITH MEALS 180 Tab 1    glucose blood VI test strips (ONETOUCH VERIO) strip Use test strip to check blood sugars twice daily. DX E11.9 100 Strip 11    losartan (COZAAR) 50 mg tablet TAKE 1 TABLET EVERY DAY 90 Tab 3    allopurinol (ZYLOPRIM) 300 mg tablet TAKE 1 TABLET BY MOUTH EVERY DAY 90 Tab 3    omeprazole (PRILOSEC) 20 mg capsule TAKE 1 CAP BY MOUTH DAILY.  90 Cap 3    tadalafil (CIALIS) 5 mg tablet Take 1 Tab by mouth daily. 30 Tab 6    lancets (ONETOUCH DELICA LANCETS) 30 gauge misc Twice daily 100 Lancet 5    multivitamin (ONE A DAY) tablet Take 1 Tab by mouth daily.  atorvastatin (LIPITOR) 80 mg tablet Take 80 mg by mouth daily.  aspirin 81 mg Tab Take 81 mg by mouth daily. Not on File   Lab Results   Component Value Date/Time    WBC 6.0 10/15/2019 10:52 AM    HGB 14.1 10/15/2019 10:52 AM    HCT 42.5 10/15/2019 10:52 AM    PLATELET 229 92/99/1817 10:52 AM    MCV 84 10/15/2019 10:52 AM     Lab Results   Component Value Date/Time    Hemoglobin A1c 7.1 (H) 10/15/2019 10:52 AM    Hemoglobin A1c 7.0 (H) 10/04/2018 02:12 PM    Hemoglobin A1c 7.2 (H) 10/02/2018 10:38 AM    Glucose 126 (H) 10/15/2019 10:52 AM    Glucose (POC) 135 (H) 10/12/2018 07:14 AM    Glucose  (A) 02/25/2019 03:45 PM    Microalb/Creat ratio (ug/mg creat.) 7.7 04/22/2019 10:11 AM    LDL, calculated 72 10/15/2019 10:52 AM    Creatinine 0.71 (L) 10/15/2019 10:52 AM      Lab Results   Component Value Date/Time    Cholesterol, total 159 10/15/2019 10:52 AM    Cholesterol (POC) 163 10/04/2011 08:23 AM    HDL Cholesterol 38 (L) 10/15/2019 10:52 AM    LDL, calculated 72 10/15/2019 10:52 AM    LDL Cholesterol (POC) N/A 10/04/2011 08:23 AM    Triglyceride 244 (H) 10/15/2019 10:52 AM    Triglycerides (POC) 596 10/04/2011 08:23 AM     Lab Results   Component Value Date/Time    GFR est non- 10/15/2019 10:52 AM    GFR est  10/15/2019 10:52 AM    Creatinine 0.71 (L) 10/15/2019 10:52 AM    BUN 11 10/15/2019 10:52 AM    Sodium 140 10/15/2019 10:52 AM    Potassium 4.3 10/15/2019 10:52 AM    Chloride 101 10/15/2019 10:52 AM    CO2 21 10/15/2019 10:52 AM    Magnesium 2.3 10/11/2018 04:18 AM        ROS    Physical Exam  Vitals signs and nursing note reviewed. Constitutional:       General: He is not in acute distress. Appearance: He is well-developed. He is obese.       Comments: Appears stated age   HENT: Head: Normocephalic. Cardiovascular:      Rate and Rhythm: Normal rate and regular rhythm. Heart sounds: Normal heart sounds. Pulmonary:      Effort: Pulmonary effort is normal.      Breath sounds: Normal breath sounds. Abdominal:      Palpations: Abdomen is soft. Skin:         Neurological:      Mental Status: He is alert. ASSESSMENT and PLAN  Diagnoses and all orders for this visit:    1. Benign prostatic hyperplasia without lower urinary tract symptoms    2. Increased prostate specific antigen (PSA) velocity  -     PSA, DIAGNOSTIC (PROSTATE SPECIFIC AG)    3. Controlled type 2 diabetes mellitus with complication, without long-term current use of insulin (HCC)  -     METABOLIC PANEL, COMPREHENSIVE  -     AMB POC HEMOGLOBIN A1C 7.8  -     METABOLIC PANEL, COMPREHENSIVE   Above goal    Work on diet and exericser   fsbs bid   If fasting and 2 hr post meal fsbs remain above goals. , pt will call to start on weekly GLP-1-discussed    4. Coronary artery disease involving native coronary artery of native heart without angina pectoris  -     METABOLIC PANEL, COMPREHENSIVE  -     METABOLIC PANEL, COMPREHENSIVE   No cp or angina  5. Essential hypertension  -     METABOLIC PANEL, COMPREHENSIVE  -     METABOLIC PANEL, COMPREHENSIVE   controlled  6. Pure hypercholesterolemia  -     METABOLIC PANEL, COMPREHENSIVE  -     LIPID PANEL  -     LIPID PANEL  -     METABOLIC PANEL, COMPREHENSIVE   On high dose lipitor 80 mg qd  7. Prostate cancer screening  -     PSA W/ REFLX FREE PSA    8. Inflamed skin tag  -     REFERRAL TO GENERAL SURGERY  -     cephALEXin (KEFLEX) 500 mg capsule; Take 1 Cap by mouth three (3) times daily. Follow-up and Dispositions    · Return in about 4 months (around 7/3/2020) for dm-2 hth hld cad.

## 2020-03-03 NOTE — PATIENT INSTRUCTIONS
Office Policies Phone calls/patient messages: Please allow up to 24 hours for someone in the office to contact you about your call or message. Be mindful your provider may be out of the office or your message may require further review. We encourage you to use [employer] for your messages as this is a faster, more efficient way to communicate with our office Medication Refills: 
         
Prescription medications require 48-72 business hours to process. We encourage you to use [employer] outdoors for your refills. For controlled medications: Please allow 72 business hours to process. Certain medications may require you to  a written prescription at our office. NO narcotic/controlled medications will be prescribed after 4pm Monday through Friday or on weekends Form/Paperwork Completion: 
         
Please note a $25 fee may incur for all paperwork for completed by our providers. We ask that you allow 7-10 business days. Pre-payment is due prior to picking up/faxing the completed form. You may also download your forms to [email or web address] outdoors to have your doctor print off.

## 2020-03-06 ENCOUNTER — PATIENT OUTREACH (OUTPATIENT)
Dept: INTERNAL MEDICINE CLINIC | Age: 60
End: 2020-03-06

## 2020-03-16 ENCOUNTER — OFFICE VISIT (OUTPATIENT)
Dept: SURGERY | Age: 60
End: 2020-03-16

## 2020-03-16 VITALS
RESPIRATION RATE: 24 BRPM | HEIGHT: 73 IN | BODY MASS INDEX: 36.68 KG/M2 | TEMPERATURE: 99.3 F | SYSTOLIC BLOOD PRESSURE: 150 MMHG | HEART RATE: 76 BPM | OXYGEN SATURATION: 97 % | WEIGHT: 276.8 LBS | DIASTOLIC BLOOD PRESSURE: 89 MMHG

## 2020-03-16 DIAGNOSIS — L91.8 CUTANEOUS SKIN TAGS: Primary | ICD-10-CM

## 2020-03-16 RX ORDER — METOPROLOL TARTRATE 25 MG/1
25 TABLET, FILM COATED ORAL 2 TIMES DAILY
COMMUNITY
Start: 2020-03-02 | End: 2022-01-04 | Stop reason: SDUPTHER

## 2020-03-16 RX ORDER — AMLODIPINE BESYLATE 5 MG/1
10 TABLET ORAL
COMMUNITY
Start: 2020-03-05 | End: 2022-01-04 | Stop reason: SDUPTHER

## 2020-03-16 NOTE — Clinical Note
3/16/20 Patient: Argenis Solano Sr  
YOB: 1960 Date of Visit: 3/16/2020 Tyler Davis MD 
[address] [address] VIA In Basket Dear Tyler Davis MD, Thank you for referring Mr. Evgeny Jim to [address] for evaluation. My notes for this consultation are attached. If you have questions, please do not hesitate to call me. I look forward to following your patient along with you.  
 
 
Sincerely, 
 
Alina Salazar MD

## 2020-03-16 NOTE — PROGRESS NOTES
To: Jerica Levi MD    From: Sarai Morse MD    Thank you for sending Sae Marquez Sr to see us. Please note that this dictation was completed with [employer], the computer voice recognition software. Quite often unanticipated grammatical, syntax, homophones, and other interpretive errors are inadvertently transcribed by the software. Please disregard these errors. Please excuse any errors that have escaped final proofreading. Encounter Date: 3/16/2020  History and Physical    Assessment:   Multiple skin tags in the left axilla and right groin. Plan:   Excision. HPI:   Janine Munoz is a 63 y.o. male who has multiple skin tags that get irritated when he is at work. They rub and become inflamed. He has tied dental floss around several of them and a few of them have fallen off but the majority have not. Past Medical History:   Diagnosis Date    Diabetes ([facility]     GERD (gastroesophageal reflux disease)     Gout     Heart attack ([facility]     stents placed    Hypercholesteremia     Hypertension     Ill-defined condition     Gout    Sleep apnea     Has not used in 3-4 months     Past Surgical History:   Procedure Laterality Date    CARDIAC SURG PROCEDURE UNLIST      stent x 2    HX APPENDECTOMY      HX CORONARY ARTERY BYPASS GRAFT  10/2018    4v cabg    HX HEART CATHETERIZATION  2018    triple bypass    NEUROLOGICAL PROCEDURE UNLISTED Right 2017    carpal tunnel      Family History   Problem Relation Age of Onset    Diabetes Mother     No Known Problems Father      Social History     Tobacco Use    Smoking status: Former Smoker     Last attempt to quit: 8/10/2018     Years since quittin.6    Smokeless tobacco: Never Used   Substance Use Topics    Alcohol use:  Yes     Alcohol/week: 5.0 standard drinks     Types: 5 Cans of beer per week     Frequency: 2-4 times a month     Drinks per session: 1 or 2     Comment: rarely      Current Outpatient Medications Medication Sig    amLODIPine (NORVASC) 5 mg tablet     metoprolol tartrate (LOPRESSOR) 25 mg tablet     ONGLYZA 5 mg tab tablet TAKE 1 TABLET BY MOUTH EVERY DAY    JARDIANCE 10 mg tablet TAKE 1 TABLET BY MOUTH EVERY DAY    metFORMIN (GLUCOPHAGE) 1,000 mg tablet TAKE 1 TABLET BY MOUTH TWICE A DAY WITH MEALS    glucose blood VI test strips (ONETOUCH VERIO) strip Use test strip to check blood sugars twice daily. DX E11.9    allopurinol (ZYLOPRIM) 300 mg tablet TAKE 1 TABLET BY MOUTH EVERY DAY    omeprazole (PRILOSEC) 20 mg capsule TAKE 1 CAP BY MOUTH DAILY.  tadalafil (CIALIS) 5 mg tablet Take 1 Tab by mouth daily.  lancets (ONETOUCH DELICA LANCETS) 30 gauge misc Twice daily    atorvastatin (LIPITOR) 80 mg tablet Take 80 mg by mouth daily.  aspirin 81 mg Tab Take 81 mg by mouth daily.  cephALEXin (KEFLEX) 500 mg capsule Take 1 Cap by mouth three (3) times daily.  losartan (COZAAR) 50 mg tablet TAKE 1 TABLET EVERY DAY    multivitamin (ONE A DAY) tablet Take 1 Tab by mouth daily. No current facility-administered medications for this visit. Allergies:  No Known Allergies    Review of Systems:  10 systems reviewed. See scanned sheet in \"Media\" section. See HPI for pertinent positives and negatives. Objective:     Visit Vitals  /89 (BP 1 Location: Left arm, BP Patient Position: Sitting)   Pulse 76   Temp 99.3 °F (37.4 °C)   Resp 24   Ht 6' 1\" (1.854 m)   Wt 125.6 kg (276 lb 12.8 oz)   SpO2 97%   BMI 36.52 kg/m²       Physical Exam:  General appearance  Alert, cooperative, no distress, appears stated age   HEENT Anicteric           Lungs   Clear to auscultation bilaterally   Heart  Regular rate and rhythm. Extremities no cyanosis or edema   Pulses 2+ right radial       Lymph nodes No palpable LAD. Neurologic Without overt sensory or motor deficit     Focused exam of the left axilla reveals multiple skin tags, 1 larger one is inflamed.   The right groin has 3 skin tags of varying size    Procedure: We addressed the left axilla first.  We washed the entire area with chlorhexidine and anesthetized each of the skin tags. I then elevated the skin tags with forceps and excised them with a small rim of grossly normal skin using a scalpel. They were treated with silver nitrate and covered with Band-Aids. The right groin was addressed in exactly the same manner. He and I discussed that he needs to keep the areas clean. I told him to shower at least twice daily and change his boxers twice daily as well. He was told to look out for tenderness or redness and to call immediately for either.     Signed By: Sri Watts MD     March 16, 2020

## 2020-04-05 RX ORDER — ALLOPURINOL 300 MG/1
TABLET ORAL
Qty: 90 TAB | Refills: 3 | Status: SHIPPED | OUTPATIENT
Start: 2020-04-05 | End: 2021-03-19 | Stop reason: SDUPTHER

## 2020-04-05 RX ORDER — OMEPRAZOLE 20 MG/1
20 CAPSULE, DELAYED RELEASE ORAL DAILY
Qty: 90 CAP | Refills: 3 | Status: SHIPPED | OUTPATIENT
Start: 2020-04-05 | End: 2021-03-19 | Stop reason: SDUPTHER

## 2020-06-06 DIAGNOSIS — E11.00 TYPE 2 DIABETES MELLITUS WITH HYPEROSMOLARITY WITHOUT COMA, WITHOUT LONG-TERM CURRENT USE OF INSULIN (HCC): ICD-10-CM

## 2020-06-06 RX ORDER — METFORMIN HYDROCHLORIDE 1000 MG/1
TABLET ORAL
Qty: 180 TAB | Refills: 1 | Status: SHIPPED | OUTPATIENT
Start: 2020-06-06 | End: 2020-12-18

## 2020-06-17 DIAGNOSIS — N52.9 ERECTILE DYSFUNCTION, UNSPECIFIED ERECTILE DYSFUNCTION TYPE: ICD-10-CM

## 2020-06-17 RX ORDER — TADALAFIL 5 MG/1
TABLET ORAL
Qty: 30 TAB | Refills: 6 | Status: SHIPPED | OUTPATIENT
Start: 2020-06-17 | End: 2020-12-17

## 2020-07-15 ENCOUNTER — VIRTUAL VISIT (OUTPATIENT)
Dept: INTERNAL MEDICINE CLINIC | Age: 60
End: 2020-07-15

## 2020-07-15 DIAGNOSIS — E78.00 PURE HYPERCHOLESTEROLEMIA: ICD-10-CM

## 2020-07-15 DIAGNOSIS — E11.8 CONTROLLED TYPE 2 DIABETES MELLITUS WITH COMPLICATION, WITHOUT LONG-TERM CURRENT USE OF INSULIN (HCC): Primary | ICD-10-CM

## 2020-07-15 DIAGNOSIS — I10 ESSENTIAL HYPERTENSION: ICD-10-CM

## 2020-07-15 DIAGNOSIS — Z12.5 PROSTATE CANCER SCREENING: ICD-10-CM

## 2020-07-15 DIAGNOSIS — I25.10 CORONARY ARTERY DISEASE INVOLVING NATIVE CORONARY ARTERY OF NATIVE HEART WITHOUT ANGINA PECTORIS: ICD-10-CM

## 2020-07-15 RX ORDER — INDOMETHACIN 25 MG/1
25 CAPSULE ORAL
Qty: 30 CAP | Refills: 1 | Status: SHIPPED | OUTPATIENT
Start: 2020-07-15 | End: 2021-02-19

## 2020-07-15 NOTE — PATIENT INSTRUCTIONS
This is an established visit conducted via telemedicine. The patient has been instructed that this meets HIPAA criteria and acknowledges and agrees to this method of visitation.     Eloisa Bloom  41/06/12  1:62 AM    Chief Complaint   Patient presents with    Cholesterol Problem     4 month follow up    Hypertension     4 month follow up    Medication Evaluation     Requesting a RX for Indocin   [employer] orders with map

## 2020-07-15 NOTE — PROGRESS NOTES
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 ALEXANDR Lehman is a 63 y.o. male being evaluated by a Virtual Visit (video visit) encounter to address concerns as mentioned above. A caregiver was present when appropriate. Due to this being a TeleHealth encounter (During VZYYY-32 public health emergency), evaluation of the following organ systems was limited: Vitals/Constitutional/EENT/Resp/CV/GI//MS/Neuro/Skin/Heme-Lymph-Imm. Pursuant to the emergency declaration under the [facility], [address] [place] [address] authority and the [employer] Act, this Virtual Visit was conducted with patient's (and/or legal guardian's) consent, to reduce the risk of exposure to COVID-19 and provide necessary medical care. Services were provided through a video synchronous discussion virtually to substitute for in-person encounter. --Derald Crigler, MD on 7/14/2020 at 11:09 PM    An electronic signature was used to authenticate this note. F/u dm-2 htn hld CAD s/p CABG x4 2018 hx gout obesity  Last a1c 7.8 LDl 72-not on weeky glp 1  Taking metformin onglyza and jardiance  fsbs 150-179 nonfasting per pt  Home bp 135/85 today  Weight  Chest pain-none  neeeds refill of indocin--minor flares of gout since last ov    Last OV         last a1c 7.1 LDL 72  amb a1c 7.8  Pt saw Dr Danish Davis last month -f/u CAD-no med changes  fsbs at home <130 fasting  Some post meal fsbs 200-210 but most 170     Has infamed mass or skin tag below left axilla. Had been using dental floss as a tourniqout last few days .  Appears inflamed now with some redness of chest wall    Patient Active Problem List    Diagnosis Date Noted    Severe obesity ([facility] 10/25/2018    CAD (coronary artery disease), native coronary artery 10/08/2018    S/P CABG x 4 10/08/2018    PAU on CPAP 02/01/2016    DM type 2 (diabetes mellitus, type 2) ([facility] 10/19/2013    Obesity 03/08/2011    CAD (Coronary Artery Disease) - stent 1999 10/20/2009    HTN (hypertension), benign 10/20/2009    Gout 10/20/2009    Hypercholesteremia 10/20/2009    Osteoarthritis of knee 10/20/2009     Current Outpatient Medications   Medication Sig Dispense Refill    tadalafiL (CIALIS) 5 mg tablet TAKE 1 TABLET BY MOUTH EVERY DAY 30 Tab 6    metFORMIN (GLUCOPHAGE) 1,000 mg tablet TAKE 1 TABLET BY MOUTH TWICE A DAY WITH MEALS 180 Tab 1    allopurinoL (ZYLOPRIM) 300 mg tablet TAKE 1 TABLET BY MOUTH EVERY DAY 90 Tab 3    omeprazole (PRILOSEC) 20 mg capsule TAKE 1 CAP BY MOUTH DAILY. 90 Cap 3    amLODIPine (NORVASC) 5 mg tablet       metoprolol tartrate (LOPRESSOR) 25 mg tablet       cephALEXin (KEFLEX) 500 mg capsule Take 1 Cap by mouth three (3) times daily. 21 Cap 0    ONGLYZA 5 mg tab tablet TAKE 1 TABLET BY MOUTH EVERY DAY 90 Tab 3    JARDIANCE 10 mg tablet TAKE 1 TABLET BY MOUTH EVERY DAY 30 Tab 11    glucose blood VI test strips (ONETOUCH VERIO) strip Use test strip to check blood sugars twice daily. DX E11.9 100 Strip 11    losartan (COZAAR) 50 mg tablet TAKE 1 TABLET EVERY DAY 90 Tab 3    lancets (ONETOUCH DELICA LANCETS) 30 gauge misc Twice daily 100 Lancet 5    multivitamin (ONE A DAY) tablet Take 1 Tab by mouth daily.  atorvastatin (LIPITOR) 80 mg tablet Take 80 mg by mouth daily.  aspirin 81 mg Tab Take 81 mg by mouth daily.        No Known Allergies   Lab Results   Component Value Date/Time    WBC 6.0 10/15/2019 10:52 AM    HGB 14.1 10/15/2019 10:52 AM    HCT 42.5 10/15/2019 10:52 AM    PLATELET 959 64/11/8954 10:52 AM    MCV 84 10/15/2019 10:52 AM     Lab Results   Component Value Date/Time    Hemoglobin A1c 7.1 (H) 10/15/2019 10:52 AM    Hemoglobin A1c 7.0 (H) 10/04/2018 02:12 PM    Hemoglobin A1c 7.2 (H) 10/02/2018 10:38 AM    Glucose 126 (H) 10/15/2019 10:52 AM    Glucose (POC) 135 (H) 10/12/2018 07:14 AM    Glucose  (A) 02/25/2019 03:45 PM    Microalb/Creat ratio (ug/mg creat.) 7.7 04/22/2019 10:11 AM    LDL, calculated 72 10/15/2019 10:52 AM    Creatinine 0.71 (L) 10/15/2019 10:52 AM      Lab Results   Component Value Date/Time    Cholesterol, total 159 10/15/2019 10:52 AM    Cholesterol (POC) 163 10/04/2011 08:23 AM    HDL Cholesterol 38 (L) 10/15/2019 10:52 AM    LDL, calculated 72 10/15/2019 10:52 AM    LDL Cholesterol (POC) N/A 10/04/2011 08:23 AM    Triglyceride 244 (H) 10/15/2019 10:52 AM    Triglycerides (POC) 596 10/04/2011 08:23 AM     Lab Results   Component Value Date/Time    GFR est non- 10/15/2019 10:52 AM    GFR est  10/15/2019 10:52 AM    Creatinine 0.71 (L) 10/15/2019 10:52 AM    BUN 11 10/15/2019 10:52 AM    Sodium 140 10/15/2019 10:52 AM    Potassium 4.3 10/15/2019 10:52 AM    Chloride 101 10/15/2019 10:52 AM    CO2 21 10/15/2019 10:52 AM    Magnesium 2.3 10/11/2018 04:18 AM        ROS    Physical Exam  Constitutional:       Appearance: He is obese. Pulmonary:      Effort: Pulmonary effort is normal.   Neurological:      Mental Status: He is alert. ASSESSMENT and PLAN  Diagnoses and all orders for this visit:    1. Controlled type 2 diabetes mellitus with complication, without long-term current use of insulin (HCC)  -     HEMOGLOBIN A1C WITH EAG  -     METABOLIC PANEL, COMPREHENSIVE  -     MICROALBUMIN, UR, RAND W/ MICROALB/CREAT RATIO   recommended annual eye exam and  daily foot exam   advseid fasting fsbs checks   Consider weekly glp-1 -discussed     2. Essential hypertension  -     METABOLIC PANEL, COMPREHENSIVE   controlled  3. Pure hypercholesterolemia  -     METABOLIC PANEL, COMPREHENSIVE   LDL goal <70  4. Coronary artery disease involving native coronary artery of native heart without angina pectoris   No cp or angina  5. Prostate cancer screening  -     PSA W/ REFLX FREE PSA    6.  Hx gout   Refill indocin      rtc 4 months dm-2 htn hld gout

## 2020-08-06 ENCOUNTER — DOCUMENTATION ONLY (OUTPATIENT)
Dept: INTERNAL MEDICINE CLINIC | Age: 60
End: 2020-08-06

## 2020-10-21 ENCOUNTER — TELEPHONE (OUTPATIENT)
Dept: INTERNAL MEDICINE CLINIC | Age: 60
End: 2020-10-21

## 2020-10-21 NOTE — TELEPHONE ENCOUNTER
#[phone]   Victor Hugo Saenz states pt's foot pain on the bottom of feet has returned. Does Dr. Sky Alberto need to see pt or have advice for him? Does he need to see a specialist for this? Please call Tanya to let her know. Thanks.

## 2020-11-15 NOTE — PROGRESS NOTES
HISTORY OF PRESENT ILLNESS  Lisa Champion Sr is a 62 y.o. male. HPI   F/u dm-2 htn hld CAD s/p CABG x4 2018 hx gout obesity  Last a1c 7.8 LDL 72  fsbs 130 fasting   Did not get labs after last OV  Due for foot and eye exam--had eye exam last month--no retinopathy    Chest pain      Has some increased BLE edema    Last OV    Last a1c 7.8 LDl 72-not on weeky glp 1  Taking metformin onglyza and jardiance  fsbs 150-179 nonfasting per pt  Home bp 135/85 today  Weight  Chest pain-none  neeeds refill of indocin--minor flares of gout since last ov    Patient Active Problem List    Diagnosis Date Noted    Severe obesity ([facility] 10/25/2018    CAD (coronary artery disease), native coronary artery 10/08/2018    S/P CABG x 4 10/08/2018    PAU on CPAP 02/01/2016    DM type 2 (diabetes mellitus, type 2) ([facility] 10/19/2013    Obesity 03/08/2011    CAD (Coronary Artery Disease) - stent 1999 10/20/2009    HTN (hypertension), benign 10/20/2009    Gout 10/20/2009    Hypercholesteremia 10/20/2009    Osteoarthritis of knee 10/20/2009     Current Outpatient Medications   Medication Sig Dispense Refill    tadalafiL (CIALIS) 5 mg tablet TAKE 1 TABLET BY MOUTH EVERY DAY 30 Tab 6    metFORMIN (GLUCOPHAGE) 1,000 mg tablet TAKE 1 TABLET BY MOUTH TWICE A DAY WITH MEALS 180 Tab 1    allopurinoL (ZYLOPRIM) 300 mg tablet TAKE 1 TABLET BY MOUTH EVERY DAY 90 Tab 3    omeprazole (PRILOSEC) 20 mg capsule TAKE 1 CAP BY MOUTH DAILY. 90 Cap 3    amLODIPine (NORVASC) 5 mg tablet 10 mg.      metoprolol tartrate (LOPRESSOR) 25 mg tablet       ONGLYZA 5 mg tab tablet TAKE 1 TABLET BY MOUTH EVERY DAY 90 Tab 3    JARDIANCE 10 mg tablet TAKE 1 TABLET BY MOUTH EVERY DAY 30 Tab 11    glucose blood VI test strips (ONETOUCH VERIO) strip Use test strip to check blood sugars twice daily. DX E11.9 100 Strip 11    losartan (COZAAR) 50 mg tablet TAKE 1 TABLET EVERY DAY 90 Tab 3    multivitamin (ONE A DAY) tablet Take 1 Tab by mouth daily.       atorvastatin (LIPITOR) 80 mg tablet Take 80 mg by mouth daily.  aspirin 81 mg Tab Take 81 mg by mouth daily. No Known Allergies  Social History     Tobacco Use    Smoking status: Former Smoker     Last attempt to quit: 8/10/2018     Years since quittin.2    Smokeless tobacco: Never Used   Substance Use Topics    Alcohol use:  Yes     Alcohol/week: 5.0 standard drinks     Types: 5 Cans of beer per week     Frequency: 2-4 times a month     Drinks per session: 1 or 2     Comment: rarely      Lab Results   Component Value Date/Time    WBC 6.0 10/15/2019 10:52 AM    HGB 14.1 10/15/2019 10:52 AM    HCT 42.5 10/15/2019 10:52 AM    PLATELET 697  10:52 AM    MCV 84 10/15/2019 10:52 AM     Lab Results   Component Value Date/Time    Hemoglobin A1c 7.1 (H) 10/15/2019 10:52 AM    Hemoglobin A1c 7.0 (H) 10/04/2018 02:12 PM    Hemoglobin A1c 7.2 (H) 10/02/2018 10:38 AM    Glucose 126 (H) 10/15/2019 10:52 AM    Glucose (POC) 135 (H) 10/12/2018 07:14 AM    Glucose  (A) 2019 03:45 PM    Microalb/Creat ratio (ug/mg creat.) 7.7 2019 10:11 AM    LDL, calculated 72 10/15/2019 10:52 AM    Creatinine 0.71 (L) 10/15/2019 10:52 AM      Lab Results   Component Value Date/Time    Cholesterol, total 159 10/15/2019 10:52 AM    Cholesterol (POC) 163 10/04/2011 08:23 AM    HDL Cholesterol 38 (L) 10/15/2019 10:52 AM    LDL, calculated 72 10/15/2019 10:52 AM    LDL Cholesterol (POC) N/A 10/04/2011 08:23 AM    Triglyceride 244 (H) 10/15/2019 10:52 AM    Triglycerides (POC) 596 10/04/2011 08:23 AM     Lab Results   Component Value Date/Time    GFR est non- 10/15/2019 10:52 AM    GFR est  10/15/2019 10:52 AM    Creatinine 0.71 (L) 10/15/2019 10:52 AM    BUN 11 10/15/2019 10:52 AM    Sodium 140 10/15/2019 10:52 AM    Potassium 4.3 10/15/2019 10:52 AM    Chloride 101 10/15/2019 10:52 AM    CO2 21 10/15/2019 10:52 AM    Magnesium 2.3 10/11/2018 04:18 AM        ROS    Physical Exam  Vitals signs and nursing note reviewed. Constitutional:       General: He is not in acute distress. Appearance: He is well-developed. He is obese. Comments: Appears stated age   HENT:      Head: Normocephalic. Cardiovascular:      Rate and Rhythm: Normal rate and regular rhythm. Heart sounds: Normal heart sounds. Pulmonary:      Effort: Pulmonary effort is normal.      Breath sounds: Normal breath sounds. Abdominal:      Palpations: Abdomen is soft. Neurological:      General: No focal deficit present. Mental Status: He is alert. Comments:      Diabetic foot exam performed by Flora Rivas MD       Measurement  Response Nurse Comment Physician Comment  Monofilament  R - normal sensation with micro filament  L - normal sensation with micro filament    Pulse DP R - 2+ (normal)  L - 2+ (normal)    Pulse TP R - 2+ (normal)  L - 2+ (normal)    Structural deformity R - None  L - None    Skin Integrity / Deformity R - None  L - None       Reviewed by:               ASSESSMENT and PLAN  Diagnoses and all orders for this visit:    1. Uncontrolled type 2 diabetes mellitus with hyperglycemia (HCC)  -      DIABETES FOOT EXAM  -     METABOLIC PANEL, COMPREHENSIVE  -     HEMOGLOBIN A1C WITH EAG  -     MICROALBUMIN, UR, RAND W/ MICROALB/CREAT RATIO   Await a1c  2. Essential hypertension  -     METABOLIC PANEL, COMPREHENSIVE   Elevated sbp--increase losartan 100 mg qd  3. Pure hypercholesterolemia  -     METABOLIC PANEL, COMPREHENSIVE  -     LIPID PANEL    4. Prostate cancer screening  -     PSA SCREENING (SCREENING)    5. Needs flu shot  -     INFLUENZA VIRUS VAC QUAD,SPLIT,PRESV FREE SYRINGE IM    Other orders  -     losartan (COZAAR) 100 mg tablet; Take 1 Tab by mouth daily. Follow-up and Dispositions    · Return in about 6 months (around 5/16/2021) for dm-2 htn hld  cad -cpe.

## 2020-11-16 ENCOUNTER — OFFICE VISIT (OUTPATIENT)
Dept: INTERNAL MEDICINE CLINIC | Age: 60
End: 2020-11-16
Payer: COMMERCIAL

## 2020-11-16 VITALS
HEART RATE: 70 BPM | RESPIRATION RATE: 16 BRPM | WEIGHT: 278 LBS | HEIGHT: 73 IN | BODY MASS INDEX: 36.84 KG/M2 | OXYGEN SATURATION: 96 % | TEMPERATURE: 97.7 F | SYSTOLIC BLOOD PRESSURE: 140 MMHG | DIASTOLIC BLOOD PRESSURE: 80 MMHG

## 2020-11-16 DIAGNOSIS — I10 ESSENTIAL HYPERTENSION: ICD-10-CM

## 2020-11-16 DIAGNOSIS — E78.00 PURE HYPERCHOLESTEROLEMIA: ICD-10-CM

## 2020-11-16 DIAGNOSIS — E11.65 UNCONTROLLED TYPE 2 DIABETES MELLITUS WITH HYPERGLYCEMIA (HCC): Primary | ICD-10-CM

## 2020-11-16 DIAGNOSIS — Z23 NEEDS FLU SHOT: ICD-10-CM

## 2020-11-16 DIAGNOSIS — Z12.5 PROSTATE CANCER SCREENING: ICD-10-CM

## 2020-11-16 PROCEDURE — 90471 IMMUNIZATION ADMIN: CPT | Performed by: INTERNAL MEDICINE

## 2020-11-16 PROCEDURE — 90686 IIV4 VACC NO PRSV 0.5 ML IM: CPT | Performed by: INTERNAL MEDICINE

## 2020-11-16 PROCEDURE — 99214 OFFICE O/P EST MOD 30 MIN: CPT | Performed by: INTERNAL MEDICINE

## 2020-11-16 PROCEDURE — 3051F HG A1C>EQUAL 7.0%<8.0%: CPT | Performed by: INTERNAL MEDICINE

## 2020-11-16 RX ORDER — LOSARTAN POTASSIUM 100 MG/1
100 TABLET ORAL DAILY
Qty: 90 TAB | Refills: 3 | Status: SHIPPED | OUTPATIENT
Start: 2020-11-16 | End: 2021-03-19

## 2020-11-16 NOTE — PATIENT INSTRUCTIONS
Office Policies Phone calls/patient messages: Please allow up to 24 hours for someone in the office to contact you about your call or message. Be mindful your provider may be out of the office or your message may require further review. We encourage you to use [employer] for your messages as this is a faster, more efficient way to communicate with our office Medication Refills: 
         
Prescription medications require 48-72 business hours to process. We encourage you to use [employer] for your refills. For controlled medications: Please allow 72 business hours to process. Certain medications may require you to  a written prescription at our office. NO narcotic/controlled medications will be prescribed after 4pm Monday through Friday or on weekends Form/Paperwork Completion: 
         
Please note a $25 fee may incur for all paperwork for completed by our providers. We ask that you allow 7-10 business days. Pre-payment is due prior to picking up/faxing the completed form. You may also download your forms to [employer] to have your doctor print off. 
 
1. Have you been to the ER, urgent care clinic since your last visit? Hospitalized since your last visit? NO 
 
2. Have you seen or consulted any other health care providers outside of the [address] [place] [address] since your last visit? Include any pap smears or colon screening. Yes, Eye Exam

## 2020-11-16 NOTE — PROGRESS NOTES
Jeanna Dangelo is a 62 y.o. male who presents for routine immunizations. He denies any symptoms , reactions or allergies that would exclude them from being immunized today. Risks and adverse reactions were discussed and the VIS was given to them. All questions were addressed. He was observed for 10 min post injection. There were no reactions observed.     Zuleima Durham LPN

## 2020-11-19 ENCOUNTER — TELEPHONE (OUTPATIENT)
Dept: INTERNAL MEDICINE CLINIC | Age: 60
End: 2020-11-19

## 2020-11-19 NOTE — TELEPHONE ENCOUNTER
Pt states that his cardiologist took him off of a medication and Dr. Estefani Landon sent it to the pharmacy. Pt would like to know if he should continue this medication. Pt did not know name of medication.

## 2020-11-20 NOTE — TELEPHONE ENCOUNTER
Spoke with pt's daughter, Keith Tan. Keith Tan stated that pt's cardio, Dr. Viraj Downing, took pt off of losartan after heart surgery back in 2018. Notified Tanya I would send a message to Dr. Lucia Smith. Tanya verbalized understanding of information discussed w/ no further questions at this time.

## 2020-11-20 NOTE — TELEPHONE ENCOUNTER
----- Message from Zuleyma Hannah sent at 11/20/2020 10:41 AM EST -----  Regarding: Dr Coral Wright  Patient return call    Caller's first and last name and relationship (if not the patient): Angelica Chang, Daughter       Best contact number(s): [phone]      Whose call is being returned: Nadya Alexandre      Details to clarify the request: Medications      Erika Bains

## 2020-11-23 NOTE — TELEPHONE ENCOUNTER
Shante Lomas MD  You 3 days ago      I reveiwed the records from his cabg-had post op hypotension so his losartan was stopped aftter surgery but his pb is up now so I would take the 100 mg dose. Pt can fu with Dr Fabiana Villagran re: HTN too    Message text      Tanya notified.

## 2020-12-17 DIAGNOSIS — N52.9 ERECTILE DYSFUNCTION, UNSPECIFIED ERECTILE DYSFUNCTION TYPE: ICD-10-CM

## 2020-12-17 RX ORDER — TADALAFIL 5 MG/1
TABLET ORAL
Qty: 30 TAB | Refills: 6 | Status: SHIPPED | OUTPATIENT
Start: 2020-12-17

## 2020-12-18 DIAGNOSIS — E11.00 TYPE 2 DIABETES MELLITUS WITH HYPEROSMOLARITY WITHOUT COMA, WITHOUT LONG-TERM CURRENT USE OF INSULIN (HCC): ICD-10-CM

## 2020-12-18 RX ORDER — METFORMIN HYDROCHLORIDE 1000 MG/1
TABLET ORAL
Qty: 180 TAB | Refills: 1 | Status: SHIPPED | OUTPATIENT
Start: 2020-12-18 | End: 2021-06-17

## 2021-01-21 RX ORDER — SAXAGLIPTIN 5 MG/1
TABLET, FILM COATED ORAL
Qty: 90 TAB | Refills: 3 | Status: SHIPPED | OUTPATIENT
Start: 2021-01-21 | End: 2021-12-15

## 2021-01-23 RX ORDER — EMPAGLIFLOZIN 10 MG/1
TABLET, FILM COATED ORAL
Qty: 30 TAB | Refills: 11 | Status: SHIPPED | OUTPATIENT
Start: 2021-01-23 | End: 2021-03-19 | Stop reason: SDUPTHER

## 2021-02-19 RX ORDER — INDOMETHACIN 25 MG/1
25 CAPSULE ORAL
Qty: 30 CAP | Refills: 1 | Status: SHIPPED | OUTPATIENT
Start: 2021-02-19 | End: 2021-03-01

## 2021-03-17 LAB
ALBUMIN SERPL-MCNC: 4.6 G/DL (ref 3.8–4.9)
ALBUMIN/CREAT UR: 14 MG/G CREAT (ref 0–29)
ALBUMIN/GLOB SERPL: 2.2 {RATIO} (ref 1.2–2.2)
ALP SERPL-CCNC: 125 IU/L (ref 39–117)
ALT SERPL-CCNC: 28 IU/L (ref 0–44)
AST SERPL-CCNC: 20 IU/L (ref 0–40)
BILIRUB SERPL-MCNC: 0.4 MG/DL (ref 0–1.2)
BUN SERPL-MCNC: 7 MG/DL (ref 6–24)
BUN/CREAT SERPL: 9 (ref 9–20)
CALCIUM SERPL-MCNC: 9.5 MG/DL (ref 8.7–10.2)
CHLORIDE SERPL-SCNC: 104 MMOL/L (ref 96–106)
CHOLEST SERPL-MCNC: 162 MG/DL (ref 100–199)
CO2 SERPL-SCNC: 23 MMOL/L (ref 20–29)
CREAT SERPL-MCNC: 0.75 MG/DL (ref 0.76–1.27)
CREAT UR-MCNC: 65.4 MG/DL
EST. AVERAGE GLUCOSE BLD GHB EST-MCNC: 163 MG/DL
GLOBULIN SER CALC-MCNC: 2.1 G/DL (ref 1.5–4.5)
GLUCOSE SERPL-MCNC: 167 MG/DL (ref 65–99)
HBA1C MFR BLD: 7.3 % (ref 4.8–5.6)
HDLC SERPL-MCNC: 38 MG/DL
LDLC SERPL CALC-MCNC: 90 MG/DL (ref 0–99)
MICROALBUMIN UR-MCNC: 9.1 UG/ML
POTASSIUM SERPL-SCNC: 4.3 MMOL/L (ref 3.5–5.2)
PROT SERPL-MCNC: 6.7 G/DL (ref 6–8.5)
PSA SERPL-MCNC: 1.7 NG/ML (ref 0–4)
SODIUM SERPL-SCNC: 141 MMOL/L (ref 134–144)
TRIGL SERPL-MCNC: 197 MG/DL (ref 0–149)
VLDLC SERPL CALC-MCNC: 34 MG/DL (ref 5–40)

## 2021-03-19 ENCOUNTER — VIRTUAL VISIT (OUTPATIENT)
Dept: INTERNAL MEDICINE CLINIC | Age: 61
End: 2021-03-19
Payer: COMMERCIAL

## 2021-03-19 DIAGNOSIS — E11.9 CONTROLLED TYPE 2 DIABETES MELLITUS WITHOUT COMPLICATION, WITHOUT LONG-TERM CURRENT USE OF INSULIN (HCC): Primary | ICD-10-CM

## 2021-03-19 DIAGNOSIS — E78.00 PURE HYPERCHOLESTEROLEMIA: ICD-10-CM

## 2021-03-19 DIAGNOSIS — I10 ESSENTIAL HYPERTENSION: ICD-10-CM

## 2021-03-19 DIAGNOSIS — I25.10 CORONARY ARTERY DISEASE INVOLVING NATIVE CORONARY ARTERY OF NATIVE HEART WITHOUT ANGINA PECTORIS: ICD-10-CM

## 2021-03-19 DIAGNOSIS — E66.9 OBESITY (BMI 30-39.9): ICD-10-CM

## 2021-03-19 PROCEDURE — 3051F HG A1C>EQUAL 7.0%<8.0%: CPT | Performed by: INTERNAL MEDICINE

## 2021-03-19 PROCEDURE — 99214 OFFICE O/P EST MOD 30 MIN: CPT | Performed by: INTERNAL MEDICINE

## 2021-03-19 RX ORDER — ALLOPURINOL 300 MG/1
TABLET ORAL
Qty: 90 TAB | Refills: 3 | Status: SHIPPED | OUTPATIENT
Start: 2021-03-19 | End: 2021-12-28

## 2021-03-19 RX ORDER — OMEPRAZOLE 20 MG/1
20 CAPSULE, DELAYED RELEASE ORAL DAILY
Qty: 90 CAP | Refills: 3 | Status: SHIPPED | OUTPATIENT
Start: 2021-03-19 | End: 2022-01-04 | Stop reason: SDUPTHER

## 2021-03-19 RX ORDER — LOSARTAN POTASSIUM 100 MG/1
100 TABLET ORAL DAILY
Qty: 90 TAB | Refills: 3 | Status: SHIPPED | OUTPATIENT
Start: 2021-03-19 | End: 2022-01-04 | Stop reason: SDUPTHER

## 2021-03-19 NOTE — PATIENT INSTRUCTIONS
This is an established visit conducted via telemedicine.  The patient has been instructed that this meets HIPAA criteria and acknowledges and agrees to this method of visitation. 
 
Sonja Hernandez LPN 
03/19/21 
8:53 AM

## 2021-03-19 NOTE — PROGRESS NOTES
HISTORY OF PRESENT ILLNESS  Anabell Lau Sr is a 62 y.o. male. HPI     Florian Griggs Sr, was evaluated through a synchronous (real-time) audio-video encounter. The patient (or guardian if applicable) is aware that this is a billable service. Verbal consent to proceed has been obtained within the past 12 months. The visit was conducted pursuant to the emergency declaration under the [facility] [address] and the [employer] and [employer] General Act. Patient identification was verified, and a caregiver was present when appropriate. The patient was located in a state where the provider was credentialed to provide care. --Kaci Lares MD on 3/19/2021 at 9:41 AM    An electronic signature was used to authenticate this note.     F/u dm-2 htn hld CAD s/p CABG x4 2018 hx gout obesity  Recent labs --a1c 7.3 LDL 90 Psa 1.7  fsbs 150-160  Home Bp 162/98    No gout episodes  No cp or sob  No edema of feet at end of the day per pt    Last OV  Last a1c 7.8 LDL 72  fsbs 130 fasting   Did not get labs after last OV  Due for foot and eye exam--had eye exam last month--no retinopathy     Chest pain        Has some increased BLE edema       Patient Active Problem List    Diagnosis Date Noted    Severe obesity ([facility] 10/25/2018    CAD (coronary artery disease), native coronary artery 10/08/2018    S/P CABG x 4 10/08/2018    PAU on CPAP 02/01/2016    DM type 2 (diabetes mellitus, type 2) ([facility] 10/19/2013    Obesity 03/08/2011    CAD (Coronary Artery Disease) - stent 1999 10/20/2009    HTN (hypertension), benign 10/20/2009    Gout 10/20/2009    Hypercholesteremia 10/20/2009    Osteoarthritis of knee 10/20/2009     Current Outpatient Medications   Medication Sig Dispense Refill    Jardiance 10 mg tablet TAKE 1 TABLET BY MOUTH EVERY DAY 30 Tab 11    Onglyza 5 mg tab tablet TAKE 1 TABLET BY MOUTH EVERY DAY 90 Tab 3    metFORMIN (GLUCOPHAGE) 1,000 mg tablet TAKE 1 TABLET BY MOUTH TWICE A DAY WITH MEALS 180 Tab 1    allopurinoL (ZYLOPRIM) 300 mg tablet TAKE 1 TABLET BY MOUTH EVERY DAY 90 Tab 3    omeprazole (PRILOSEC) 20 mg capsule TAKE 1 CAP BY MOUTH DAILY. 90 Cap 3    amLODIPine (NORVASC) 5 mg tablet 10 mg.      metoprolol tartrate (LOPRESSOR) 25 mg tablet       glucose blood VI test strips (ONETOUCH VERIO) strip Use test strip to check blood sugars twice daily. DX E11.9 100 Strip 11    multivitamin (ONE A DAY) tablet Take 1 Tab by mouth daily.  atorvastatin (LIPITOR) 80 mg tablet Take 80 mg by mouth daily.  aspirin 81 mg Tab Take 81 mg by mouth daily.  tadalafiL (CIALIS) 5 mg tablet TAKE 1 TABLET BY MOUTH EVERY DAY 30 Tab 6    losartan (COZAAR) 100 mg tablet Take 1 Tab by mouth daily.  90 Tab 3     No Known Allergies   Lab Results   Component Value Date/Time    WBC 6.0 10/15/2019 10:52 AM    HGB 14.1 10/15/2019 10:52 AM    HCT 42.5 10/15/2019 10:52 AM    PLATELET 442 83/63/4083 10:52 AM    MCV 84 10/15/2019 10:52 AM     Lab Results   Component Value Date/Time    Hemoglobin A1c 7.3 (H) 03/16/2021 07:51 AM    Hemoglobin A1c 7.1 (H) 10/15/2019 10:52 AM    Hemoglobin A1c 7.0 (H) 10/04/2018 02:12 PM    Glucose 167 (H) 03/16/2021 07:51 AM    Glucose (POC) 135 (H) 10/12/2018 07:14 AM    Glucose  (A) 02/25/2019 03:45 PM    Microalb/Creat ratio (ug/mg creat.) 14 03/16/2021 07:51 AM    LDL, calculated 90 03/16/2021 07:51 AM    LDL, calculated 72 10/15/2019 10:52 AM    Creatinine 0.75 (L) 03/16/2021 07:51 AM      Lab Results   Component Value Date/Time    Cholesterol, total 162 03/16/2021 07:51 AM    Cholesterol (POC) 163 10/04/2011 08:23 AM    HDL Cholesterol 38 (L) 03/16/2021 07:51 AM    LDL, calculated 90 03/16/2021 07:51 AM    LDL, calculated 72 10/15/2019 10:52 AM    LDL Cholesterol (POC) N/A 10/04/2011 08:23 AM    Triglyceride 197 (H) 03/16/2021 07:51 AM    Triglycerides (POC) 596 10/04/2011 08:23 AM     Lab Results Component Value Date/Time    GFR est non- 03/16/2021 07:51 AM    GFR est  03/16/2021 07:51 AM    Creatinine 0.75 (L) 03/16/2021 07:51 AM    BUN 7 03/16/2021 07:51 AM    Sodium 141 03/16/2021 07:51 AM    Potassium 4.3 03/16/2021 07:51 AM    Chloride 104 03/16/2021 07:51 AM    CO2 23 03/16/2021 07:51 AM    Magnesium 2.3 10/11/2018 04:18 AM     Lab Results   Component Value Date/Time    Prostate Specific Ag 1.7 03/16/2021 07:51 AM    Prostate Specific Ag 2.0 04/22/2019 10:11 AM    Prostate Specific Ag 1.3 03/26/2018 09:50 AM     Lab Results   Component Value Date/Time    Glucose 167 (H) 03/16/2021 07:51 AM    Glucose (POC) 135 (H) 10/12/2018 07:14 AM    Glucose  (A) 02/25/2019 03:45 PM         ROS    Physical Exam  Constitutional:       Appearance: Normal appearance. He is obese. Pulmonary:      Effort: Pulmonary effort is normal.   Neurological:      Mental Status: He is alert. ASSESSMENT and PLAN  Diagnoses and all orders for this visit:    1. Controlled type 2 diabetes mellitus without complication, without long-term current use of insulin (HCC)   a1c is close to goal   Work on diet and exericse   Pt declines adding insulin or glp-1  2. Essential hypertension   Elevated--restart losartan  3. Pure hypercholesterolemia   LDL: slightly above goa   Take lipitor qam to improve adherence  4. Coronary artery disease involving native coronary artery of native heart without angina pectoris   Stable-fu Dr Ami Johnston  5. Obesity (BMI 30-39. 9)   I have reviewed/discussed the above normal BMI with the patient. I have recommended the following interventions: dietary management education, guidance, and counseling and encourage exercise . Iftikhar Hanley 6. Preventive   Recommended covid vaccine--will ask staff to  register pt at [facility] vaccination site  Other orders  -     empagliflozin (Jardiance) 10 mg tablet; TAKE 1 TABLET BY MOUTH EVERY DAY  -     allopurinoL (ZYLOPRIM) 300 mg tablet;  One qd  - omeprazole (PRILOSEC) 20 mg capsule; Take 1 Cap by mouth daily. -     losartan (COZAAR) 100 mg tablet; Take 1 Tab by mouth daily.

## 2021-06-17 DIAGNOSIS — E11.00 TYPE 2 DIABETES MELLITUS WITH HYPEROSMOLARITY WITHOUT COMA, WITHOUT LONG-TERM CURRENT USE OF INSULIN (HCC): ICD-10-CM

## 2021-06-17 RX ORDER — METFORMIN HYDROCHLORIDE 1000 MG/1
TABLET ORAL
Qty: 180 TABLET | Refills: 1 | Status: SHIPPED | OUTPATIENT
Start: 2021-06-17 | End: 2021-11-04

## 2021-08-19 ENCOUNTER — TELEPHONE (OUTPATIENT)
Dept: INTERNAL MEDICINE CLINIC | Age: 61
End: 2021-08-19

## 2021-08-19 NOTE — TELEPHONE ENCOUNTER
Caller: Larry Wade,   Verified on [employer] dated 12/17/20   [phone]      States that pt  needs a letter from the dr stating:  · meds pt takes,  · reason on med,   · dose/frequency,   · what will happen if dose not taken on time        Would like letter addressed to pt  if possible, otherwise can address to pt:  Leena Ta 89, Shivam 50 would like to pick letter up from the office.     Requests it in the next couple of days         When ready or if any questions or problems, please call :  Larry Wade,   Verified on [employer] dated 12/17/20   [phone]

## 2021-08-19 NOTE — TELEPHONE ENCOUNTER
I'm okay with doing the letter, just wanted to confirm that this is something I could do since wife is requesting information to  be sent to .

## 2021-08-23 NOTE — TELEPHONE ENCOUNTER
I have attempted without success to contact this patient by phone. Unable to reach patient at this time. No personal VM, left generic message to return call to office at earliest convenience. Awaiting call back at this time.

## 2021-08-23 NOTE — TELEPHONE ENCOUNTER
#[phone]  Daughter, Terra Ulloa is asking for a call back pertaining to letter that needs to be done for her dad. Please call to give her the status.

## 2021-10-23 NOTE — PROGRESS NOTES
HISTORY OF PRESENT ILLNESS  Marty Nazario Sr is a 62 y.o. male. HPI      F/u dm-2 htn hld CAD s/p CABG x4 2018 hx gout obesity  Last a1c 7.3 LDL 90  fsbs around 110-120 fasting --has dexcom meter now-some post meal above 200  Has made some diet changes and lost weight 10 lbs  No cp sob or neuropathy symptoms  Had recent eye exam-no retinopathy  CARD MD Dr Zaki Schwartz  Works [employer] and walks a lot      Last OV  Recent labs --a1c 7.3 LDL 90 Psa 1.7  fsbs 150-160  Home Bp 162/98     No gout episodes  No cp or sob  No edema of feet at end of the day per pt      Patient Active Problem List    Diagnosis Date Noted    Severe obesity ([facility] 10/25/2018    CAD (coronary artery disease), native coronary artery 10/08/2018    S/P CABG x 4 10/08/2018    PAU on CPAP 02/01/2016    DM type 2 (diabetes mellitus, type 2) ([facility] 10/19/2013    Obesity 03/08/2011    HTN (hypertension), benign 10/20/2009    Gout 10/20/2009    Hypercholesteremia 10/20/2009    Osteoarthritis of knee 10/20/2009     Current Outpatient Medications   Medication Sig Dispense Refill    metFORMIN (GLUCOPHAGE) 1,000 mg tablet TAKE 1 TABLET BY MOUTH TWICE A DAY WITH MEALS 180 Tablet 1    empagliflozin (Jardiance) 10 mg tablet TAKE 1 TABLET BY MOUTH EVERY DAY 90 Tab 3    allopurinoL (ZYLOPRIM) 300 mg tablet One qd 90 Tab 3    omeprazole (PRILOSEC) 20 mg capsule Take 1 Cap by mouth daily. 90 Cap 3    losartan (COZAAR) 100 mg tablet Take 1 Tab by mouth daily. 90 Tab 3    Onglyza 5 mg tab tablet TAKE 1 TABLET BY MOUTH EVERY DAY 90 Tab 3    amLODIPine (NORVASC) 5 mg tablet 10 mg.      metoprolol tartrate (LOPRESSOR) 25 mg tablet       glucose blood VI test strips (ONETOUCH VERIO) strip Use test strip to check blood sugars twice daily. DX E11.9 100 Strip 11    multivitamin (ONE A DAY) tablet Take 1 Tab by mouth daily.  atorvastatin (LIPITOR) 80 mg tablet Take 80 mg by mouth daily.  aspirin 81 mg Tab Take 81 mg by mouth daily.       tadalafiL (CIALIS) 5 mg tablet TAKE 1 TABLET BY MOUTH EVERY DAY 30 Tab 6     No Known Allergies   Lab Results   Component Value Date/Time    WBC 6.0 10/15/2019 10:52 AM    HGB 14.1 10/15/2019 10:52 AM    HCT 42.5 10/15/2019 10:52 AM    PLATELET 679 45/03/2592 10:52 AM    MCV 84 10/15/2019 10:52 AM     Lab Results   Component Value Date/Time    Hemoglobin A1c 7.3 (H) 03/16/2021 07:51 AM    Hemoglobin A1c 7.1 (H) 10/15/2019 10:52 AM    Hemoglobin A1c 7.0 (H) 10/04/2018 02:12 PM    Glucose 167 (H) 03/16/2021 07:51 AM    Glucose (POC) 135 (H) 10/12/2018 07:14 AM    Glucose  (A) 02/25/2019 03:45 PM    Microalb/Creat ratio (ug/mg creat.) 14 03/16/2021 07:51 AM    LDL, calculated 90 03/16/2021 07:51 AM    LDL, calculated 72 10/15/2019 10:52 AM    Creatinine 0.75 (L) 03/16/2021 07:51 AM      Lab Results   Component Value Date/Time    Cholesterol, total 162 03/16/2021 07:51 AM    Cholesterol (POC) 163 10/04/2011 08:23 AM    HDL Cholesterol 38 (L) 03/16/2021 07:51 AM    LDL, calculated 90 03/16/2021 07:51 AM    LDL, calculated 72 10/15/2019 10:52 AM    LDL Cholesterol (POC) N/A 10/04/2011 08:23 AM    Triglyceride 197 (H) 03/16/2021 07:51 AM    Triglycerides (POC) 596 10/04/2011 08:23 AM     Lab Results   Component Value Date/Time    GFR est non- 03/16/2021 07:51 AM    GFR est  03/16/2021 07:51 AM    Creatinine 0.75 (L) 03/16/2021 07:51 AM    BUN 7 03/16/2021 07:51 AM    Sodium 141 03/16/2021 07:51 AM    Potassium 4.3 03/16/2021 07:51 AM    Chloride 104 03/16/2021 07:51 AM    CO2 23 03/16/2021 07:51 AM    Magnesium 2.3 10/11/2018 04:18 AM        ROS    Physical Exam  Vitals and nursing note reviewed. Constitutional:       General: He is not in acute distress. Appearance: Normal appearance. He is well-developed. He is obese. Comments: Appears stated age   HENT:      Head: Normocephalic. Cardiovascular:      Rate and Rhythm: Normal rate and regular rhythm. Heart sounds: Normal heart sounds. Pulmonary:      Effort: Pulmonary effort is normal.      Breath sounds: Normal breath sounds. Abdominal:      Palpations: Abdomen is soft. Neurological:      Mental Status: He is alert. Comments:      Diabetic foot exam performed by Ericka Jc MD       Measurement  Response Nurse Comment Physician Comment  Monofilament  R - normal sensation with micro filament  L - normal sensation with micro filament    Pulse DP R - 2+ (normal)  L - 2+ (normal)    Pulse TP R - 2+ (normal)  L - 2+ (normal)    Structural deformity R - None  L - None    Skin Integrity / Deformity R - None  L - None       Reviewed by:               ASSESSMENT and PLAN  Diagnoses and all orders for this visit:    1. Coronary artery disease involving native coronary artery of native heart without angina pectoris  -     METABOLIC PANEL, COMPREHENSIVE; Future  -     LIPID PANEL; Future    No cp or angina  2. Hypertension, unspecified type  -     CBC W/O DIFF; Future  -     METABOLIC PANEL, COMPREHENSIVE; Future   controlled  3. Pure hypercholesterolemia  -     METABOLIC PANEL, COMPREHENSIVE; Future  -     LIPID PANEL; Future   Goal LDL < 70-consider adding zetia  4. Controlled type 2 diabetes mellitus without complication, without long-term current use of insulin (HCC)  -     HEMOGLOBIN A1C WITH EAG; Future  -     METABOLIC PANEL, COMPREHENSIVE; Future  -     HM DIABETES FOOT EXAM   Controlled per fsbs readings   Had recent eye exam    Follow-up and Dispositions    · Return in about 6 months (around 4/25/2022) for CPE.

## 2021-10-25 ENCOUNTER — OFFICE VISIT (OUTPATIENT)
Dept: INTERNAL MEDICINE CLINIC | Age: 61
End: 2021-10-25
Payer: COMMERCIAL

## 2021-10-25 VITALS
HEIGHT: 71 IN | SYSTOLIC BLOOD PRESSURE: 120 MMHG | DIASTOLIC BLOOD PRESSURE: 78 MMHG | BODY MASS INDEX: 37.24 KG/M2 | WEIGHT: 266 LBS | TEMPERATURE: 98.1 F | HEART RATE: 62 BPM | OXYGEN SATURATION: 97 % | RESPIRATION RATE: 16 BRPM

## 2021-10-25 DIAGNOSIS — I10 HYPERTENSION, UNSPECIFIED TYPE: ICD-10-CM

## 2021-10-25 DIAGNOSIS — E78.00 PURE HYPERCHOLESTEROLEMIA: ICD-10-CM

## 2021-10-25 DIAGNOSIS — E11.9 CONTROLLED TYPE 2 DIABETES MELLITUS WITHOUT COMPLICATION, WITHOUT LONG-TERM CURRENT USE OF INSULIN (HCC): ICD-10-CM

## 2021-10-25 DIAGNOSIS — I25.10 CORONARY ARTERY DISEASE INVOLVING NATIVE CORONARY ARTERY OF NATIVE HEART WITHOUT ANGINA PECTORIS: Primary | ICD-10-CM

## 2021-10-25 LAB
ALBUMIN SERPL-MCNC: 4.2 G/DL (ref 3.5–5)
ALBUMIN/GLOB SERPL: 1.5 {RATIO} (ref 1.1–2.2)
ALP SERPL-CCNC: 101 U/L (ref 45–117)
ALT SERPL-CCNC: 32 U/L (ref 12–78)
ANION GAP SERPL CALC-SCNC: 6 MMOL/L (ref 5–15)
AST SERPL-CCNC: 15 U/L (ref 15–37)
BILIRUB SERPL-MCNC: 0.8 MG/DL (ref 0.2–1)
BUN SERPL-MCNC: 18 MG/DL (ref 6–20)
BUN/CREAT SERPL: 21 (ref 12–20)
CALCIUM SERPL-MCNC: 9.5 MG/DL (ref 8.5–10.1)
CHLORIDE SERPL-SCNC: 108 MMOL/L (ref 97–108)
CHOLEST SERPL-MCNC: 145 MG/DL
CO2 SERPL-SCNC: 24 MMOL/L (ref 21–32)
CREAT SERPL-MCNC: 0.87 MG/DL (ref 0.7–1.3)
ERYTHROCYTE [DISTWIDTH] IN BLOOD BY AUTOMATED COUNT: 13.3 % (ref 11.5–14.5)
EST. AVERAGE GLUCOSE BLD GHB EST-MCNC: 140 MG/DL
GLOBULIN SER CALC-MCNC: 2.8 G/DL (ref 2–4)
GLUCOSE SERPL-MCNC: 132 MG/DL (ref 65–100)
HBA1C MFR BLD: 6.5 % (ref 4–5.6)
HCT VFR BLD AUTO: 44.4 % (ref 36.6–50.3)
HDLC SERPL-MCNC: 37 MG/DL
HDLC SERPL: 3.9 {RATIO} (ref 0–5)
HGB BLD-MCNC: 14.6 G/DL (ref 12.1–17)
LDLC SERPL CALC-MCNC: 68.4 MG/DL (ref 0–100)
MCH RBC QN AUTO: 29.1 PG (ref 26–34)
MCHC RBC AUTO-ENTMCNC: 32.9 G/DL (ref 30–36.5)
MCV RBC AUTO: 88.4 FL (ref 80–99)
NRBC # BLD: 0 K/UL (ref 0–0.01)
NRBC BLD-RTO: 0 PER 100 WBC
PLATELET # BLD AUTO: 216 K/UL (ref 150–400)
PMV BLD AUTO: 10.8 FL (ref 8.9–12.9)
POTASSIUM SERPL-SCNC: 4.4 MMOL/L (ref 3.5–5.1)
PROT SERPL-MCNC: 7 G/DL (ref 6.4–8.2)
RBC # BLD AUTO: 5.02 M/UL (ref 4.1–5.7)
SODIUM SERPL-SCNC: 138 MMOL/L (ref 136–145)
TRIGL SERPL-MCNC: 198 MG/DL (ref ?–150)
VLDLC SERPL CALC-MCNC: 39.6 MG/DL
WBC # BLD AUTO: 8.2 K/UL (ref 4.1–11.1)

## 2021-10-25 PROCEDURE — 99213 OFFICE O/P EST LOW 20 MIN: CPT | Performed by: INTERNAL MEDICINE

## 2021-10-25 NOTE — PATIENT INSTRUCTIONS
Office Policies    Phone calls/patient messages:            Please allow up to 24 hours for someone in the office to contact you about your call or message. Be mindful your provider may be out of the office or your message may require further review. We encourage you to use [employer] for your messages as this is a faster, more efficient way to communicate with our office                         Medication Refills:            Prescription medications require 48-72 business hours to process. We encourage you to use [employer] for your refills. For controlled medications: Please allow 72 business hours to process. Certain medications may require you to  a written prescription at our office. NO narcotic/controlled medications will be prescribed after 4pm Monday through Friday or on weekends              Form/Paperwork Completion:            Please note a $25 fee may incur for all paperwork for completed by our providers. We ask that you allow 7-10 business days. Pre-payment is due prior to picking up/faxing the completed form. You may also download your forms to [employer] to have your doctor print off.

## 2021-11-04 DIAGNOSIS — E11.00 TYPE 2 DIABETES MELLITUS WITH HYPEROSMOLARITY WITHOUT COMA, WITHOUT LONG-TERM CURRENT USE OF INSULIN (HCC): ICD-10-CM

## 2021-11-04 RX ORDER — METFORMIN HYDROCHLORIDE 1000 MG/1
TABLET ORAL
Qty: 180 TABLET | Refills: 1 | Status: SHIPPED | OUTPATIENT
Start: 2021-11-04 | End: 2022-01-04 | Stop reason: SDUPTHER

## 2021-12-15 RX ORDER — SAXAGLIPTIN 5 MG/1
TABLET, FILM COATED ORAL
Qty: 90 TABLET | Refills: 3 | Status: SHIPPED | OUTPATIENT
Start: 2021-12-15 | End: 2022-01-04 | Stop reason: SDUPTHER

## 2021-12-28 RX ORDER — ALLOPURINOL 300 MG/1
TABLET ORAL
Qty: 90 TABLET | Refills: 3 | Status: SHIPPED | OUTPATIENT
Start: 2021-12-28 | End: 2022-01-04 | Stop reason: SDUPTHER

## 2022-01-03 ENCOUNTER — TELEPHONE (OUTPATIENT)
Dept: INTERNAL MEDICINE CLINIC | Age: 62
End: 2022-01-03

## 2022-01-03 ENCOUNTER — NURSE TRIAGE (OUTPATIENT)
Dept: OTHER | Facility: CLINIC | Age: 62
End: 2022-01-03

## 2022-01-03 NOTE — TELEPHONE ENCOUNTER
Received call from  [place] at [facility], caller not on line. Complaint:   Lost weight   Dizziness headaches       Market: Mary Benitez Name: [facility]     Caller's telephone number verified as [phone]- daughter jcarlos     Unsuccessful attempt to re-connect with caller via phone, left message for return call to office     Received call from  [place]  at [facility] with Red Flag Complaint. Subjective: Caller states \"feeling whoozy \"     Current Symptoms:   Dizziness,    Onset: a few days ago; gradual    Associated Symptoms:   Shaking  A lot   flutters intermittently rare   Headache  Above the eyes  x 2 weeks   Positive for covid 12/9    Pain Severity: n/a    Temperature:  Denies     What has been tried:  Ibuprofen       LMP: NA Pregnant: NA    Recommended disposition: seen in the office today     Care advice provided, patient verbalizes understanding; denies any other questions or concerns; instructed to call back for any new or worsening symptoms. Patient/Caller agrees with recommended disposition; writer provided warm transfer to [facility] at [facility] for appointment scheduling    Attention Provider: Thank you for allowing me to participate in the care of your patient. The patient was connected to triage in response to information provided to the [facility]. Please do not respond through this encounter as the response is not directed to a shared pool.         Reason for Disposition   [1] PERSISTING SYMPTOMS OF COVID-19 AND [2] symptoms WORSE   MODERATE dizziness (e.g., interferes with normal activities) (Exception: dizziness caused by heat exposure, sudden standing, or poor fluid intake)    Protocols used: COVID-19 - PERSISTING SYMPTOMS FOLLOW-UP CALL-ADULT-, DIZZINESS-ADULT-[place]

## 2022-01-03 NOTE — TELEPHONE ENCOUNTER
----- Message from [address] sent at 1/3/2022  1:58 PM EST -----  Subject: Appointment Request    Reason for Call: Immediate Return from RN Triage    QUESTIONS  Type of Appointment? Established Patient  Reason for appointment request? No appointments available during search  Additional Information for Provider? spoke with patient daughter Britany Betts on   [employer], regarding the office closed on 1/3, and advised per the nurse   triage call today 1/3 to [facility] ED or [facility] , please advise patient once   returned into the office regarding the nurse triage encounter from 1/3/22  ---------------------------------------------------------------------------  --------------  [address]  What is the best way for the office to contact you? OK to leave message on   voicemail  Preferred Call Back Phone Number?  [phone]  ---------------------------------------------------------------------------  --------------  SCRIPT ANSWERS

## 2022-01-04 ENCOUNTER — OFFICE VISIT (OUTPATIENT)
Dept: INTERNAL MEDICINE CLINIC | Age: 62
End: 2022-01-04
Payer: COMMERCIAL

## 2022-01-04 VITALS
RESPIRATION RATE: 18 BRPM | HEIGHT: 71 IN | BODY MASS INDEX: 34.44 KG/M2 | OXYGEN SATURATION: 98 % | TEMPERATURE: 98.2 F | WEIGHT: 246 LBS

## 2022-01-04 DIAGNOSIS — E11.00 TYPE 2 DIABETES MELLITUS WITH HYPEROSMOLARITY WITHOUT COMA, WITHOUT LONG-TERM CURRENT USE OF INSULIN (HCC): ICD-10-CM

## 2022-01-04 DIAGNOSIS — R42 DIZZINESS: ICD-10-CM

## 2022-01-04 DIAGNOSIS — R63.4 WEIGHT LOSS: Primary | ICD-10-CM

## 2022-01-04 LAB
ALBUMIN SERPL-MCNC: 3.8 G/DL (ref 3.5–5)
ALBUMIN/GLOB SERPL: 1.4 {RATIO} (ref 1.1–2.2)
ALP SERPL-CCNC: 122 U/L (ref 45–117)
ALT SERPL-CCNC: 40 U/L (ref 12–78)
ANION GAP SERPL CALC-SCNC: 5 MMOL/L (ref 5–15)
AST SERPL-CCNC: 20 U/L (ref 15–37)
BILIRUB SERPL-MCNC: 0.6 MG/DL (ref 0.2–1)
BUN SERPL-MCNC: 12 MG/DL (ref 6–20)
BUN/CREAT SERPL: 15 (ref 12–20)
CALCIUM SERPL-MCNC: 9.3 MG/DL (ref 8.5–10.1)
CHLORIDE SERPL-SCNC: 111 MMOL/L (ref 97–108)
CO2 SERPL-SCNC: 28 MMOL/L (ref 21–32)
CREAT SERPL-MCNC: 0.8 MG/DL (ref 0.7–1.3)
ERYTHROCYTE [DISTWIDTH] IN BLOOD BY AUTOMATED COUNT: 15 % (ref 11.5–14.5)
EST. AVERAGE GLUCOSE BLD GHB EST-MCNC: 126 MG/DL
GLOBULIN SER CALC-MCNC: 2.7 G/DL (ref 2–4)
GLUCOSE SERPL-MCNC: 89 MG/DL (ref 65–100)
HBA1C MFR BLD: 6 % (ref 4–5.6)
HCT VFR BLD AUTO: 38.3 % (ref 36.6–50.3)
HGB BLD-MCNC: 12.3 G/DL (ref 12.1–17)
MCH RBC QN AUTO: 27.5 PG (ref 26–34)
MCHC RBC AUTO-ENTMCNC: 32.1 G/DL (ref 30–36.5)
MCV RBC AUTO: 85.7 FL (ref 80–99)
NRBC # BLD: 0 K/UL (ref 0–0.01)
NRBC BLD-RTO: 0 PER 100 WBC
PLATELET # BLD AUTO: 215 K/UL (ref 150–400)
PMV BLD AUTO: 9.6 FL (ref 8.9–12.9)
POTASSIUM SERPL-SCNC: 4.3 MMOL/L (ref 3.5–5.1)
PROT SERPL-MCNC: 6.5 G/DL (ref 6.4–8.2)
RBC # BLD AUTO: 4.47 M/UL (ref 4.1–5.7)
SODIUM SERPL-SCNC: 144 MMOL/L (ref 136–145)
TSH SERPL DL<=0.05 MIU/L-ACNC: 0.87 UIU/ML (ref 0.36–3.74)
WBC # BLD AUTO: 4.2 K/UL (ref 4.1–11.1)

## 2022-01-04 PROCEDURE — 99213 OFFICE O/P EST LOW 20 MIN: CPT | Performed by: INTERNAL MEDICINE

## 2022-01-04 RX ORDER — OMEPRAZOLE 20 MG/1
20 CAPSULE, DELAYED RELEASE ORAL DAILY
Qty: 90 CAPSULE | Refills: 3 | Status: SHIPPED | OUTPATIENT
Start: 2022-01-04

## 2022-01-04 RX ORDER — ALLOPURINOL 300 MG/1
TABLET ORAL
Qty: 90 TABLET | Refills: 3 | Status: SHIPPED | OUTPATIENT
Start: 2022-01-04

## 2022-01-04 RX ORDER — ATORVASTATIN CALCIUM 80 MG/1
80 TABLET, FILM COATED ORAL DAILY
Qty: 90 TABLET | Refills: 3 | Status: SHIPPED | OUTPATIENT
Start: 2022-01-04

## 2022-01-04 RX ORDER — METOPROLOL TARTRATE 25 MG/1
25 TABLET, FILM COATED ORAL 2 TIMES DAILY
Qty: 180 TABLET | Refills: 3 | Status: SHIPPED | OUTPATIENT
Start: 2022-01-04

## 2022-01-04 RX ORDER — AMLODIPINE BESYLATE 5 MG/1
10 TABLET ORAL DAILY
Qty: 90 TABLET | Refills: 3 | Status: SHIPPED | OUTPATIENT
Start: 2022-01-04 | End: 2022-04-05

## 2022-01-04 RX ORDER — LOSARTAN POTASSIUM 100 MG/1
100 TABLET ORAL DAILY
Qty: 90 TABLET | Refills: 3 | Status: SHIPPED | OUTPATIENT
Start: 2022-01-04

## 2022-01-04 RX ORDER — METFORMIN HYDROCHLORIDE 1000 MG/1
1000 TABLET ORAL 2 TIMES DAILY WITH MEALS
Qty: 180 TABLET | Refills: 1 | Status: SHIPPED | OUTPATIENT
Start: 2022-01-04 | End: 2022-04-14

## 2022-01-04 NOTE — PROGRESS NOTES
HISTORY OF PRESENT ILLNESS  Arlette Ndiaye Sr is a 62 y.o. male. 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  Has good appetite now but weight still down 20 -plus lbs  reports gained about 5 lbs since return home  fsbs around 100-150 at [facility] with dexcom  Feels lightheaded when standing up only  Some headahces but resolved since yesteray    Patient Active Problem List    Diagnosis Date Noted    [facility] 10/25/2018    CAD (coronary artery disease), native coronary artery 10/08/2018    S/P CABG x 4 10/08/2018    PAU on CPAP 02/01/2016    DM type 2 (diabetes mellitus, type 2) ([facility] 10/19/2013    Obesity 03/08/2011    HTN (hypertension), benign 10/20/2009    Gout 10/20/2009    Hypercholesteremia 10/20/2009    Osteoarthritis of knee 10/20/2009     Current Outpatient Medications   Medication Sig Dispense Refill    sAXagliptin (Onglyza) 5 mg tab tablet Take 1 Tablet by mouth daily. 90 Tablet 3    metFORMIN (GLUCOPHAGE) 1,000 mg tablet Take 1 Tablet by mouth two (2) times daily (with meals). 180 Tablet 1    allopurinoL (ZYLOPRIM) 300 mg tablet TAKE 1 TABLET BY MOUTH EVERY DAY 90 Tablet 3    omeprazole (PRILOSEC) 20 mg capsule Take 1 Capsule by mouth daily. 90 Capsule 3    losartan (COZAAR) 100 mg tablet Take 1 Tablet by mouth daily. 90 Tablet 3    amLODIPine (NORVASC) 5 mg tablet Take 2 Tablets by mouth daily. 90 Tablet 3    metoprolol tartrate (LOPRESSOR) 25 mg tablet Take 1 Tablet by mouth two (2) times a day. 180 Tablet 3    atorvastatin (Lipitor) 80 mg tablet Take 1 Tablet by mouth daily.  90 Tablet 3    empagliflozin (Jardiance) 10 mg tablet TAKE 1 TABLET BY MOUTH EVERY DAY 90 Tab 3    tadalafiL (CIALIS) 5 mg tablet TAKE 1 TABLET BY MOUTH EVERY DAY 30 Tab 6    glucose blood VI test strips (ONETOUCH VERIO) strip Use test strip to check blood sugars twice daily. DX E11.9 100 Strip 11    multivitamin (ONE A DAY) tablet Take 1 Tab by mouth daily.  aspirin 81 mg Tab Take 81 mg by mouth daily. No Known Allergies   Lab Results   Component Value Date/Time    WBC 8.2 10/25/2021 09:13 AM    HGB 14.6 10/25/2021 09:13 AM    HCT 44.4 10/25/2021 09:13 AM    PLATELET 707 48/50/7521 09:13 AM    MCV 88.4 10/25/2021 09:13 AM     Lab Results   Component Value Date/Time    Hemoglobin A1c 6.5 (H) 10/25/2021 09:13 AM    Hemoglobin A1c 7.3 (H) 03/16/2021 07:51 AM    Hemoglobin A1c 7.1 (H) 10/15/2019 10:52 AM    Glucose 132 (H) 10/25/2021 09:13 AM    Glucose (POC) 135 (H) 10/12/2018 07:14 AM    Glucose  (A) 02/25/2019 03:45 PM    Microalb/Creat ratio (ug/mg creat.) 14 03/16/2021 07:51 AM    LDL, calculated 68.4 10/25/2021 09:13 AM    Creatinine 0.87 10/25/2021 09:13 AM      Lab Results   Component Value Date/Time    Cholesterol, total 145 10/25/2021 09:13 AM    Cholesterol (POC) 163 10/04/2011 08:23 AM    HDL Cholesterol 37 10/25/2021 09:13 AM    LDL, calculated 68.4 10/25/2021 09:13 AM    LDL Cholesterol (POC) N/A 10/04/2011 08:23 AM    Triglyceride 198 (H) 10/25/2021 09:13 AM    Triglycerides (POC) 596 10/04/2011 08:23 AM    CHOL/HDL Ratio 3.9 10/25/2021 09:13 AM     Lab Results   Component Value Date/Time    GFR est non-AA >60 10/25/2021 09:13 AM    GFR est AA >60 10/25/2021 09:13 AM    Creatinine 0.87 10/25/2021 09:13 AM    BUN 18 10/25/2021 09:13 AM    Sodium 138 10/25/2021 09:13 AM    Potassium 4.4 10/25/2021 09:13 AM    Chloride 108 10/25/2021 09:13 AM    CO2 24 10/25/2021 09:13 AM    Magnesium 2.3 10/11/2018 04:18 AM        ROS    Physical Exam  Vitals and nursing note reviewed. Constitutional:       General: He is not in acute distress. Appearance: Normal appearance. He is well-developed. He is obese. Comments: Appears stated age   HENT:      Head: Normocephalic. Cardiovascular:      Rate and Rhythm: Normal rate and regular rhythm. Heart sounds: Normal heart sounds. Pulmonary:      Effort: Pulmonary effort is normal.      Breath sounds: Normal breath sounds. Abdominal:      Palpations: Abdomen is soft. Musculoskeletal:      Right lower leg: No edema. Left lower leg: No edema. Neurological:      Mental Status: He is alert. ASSESSMENT and PLAN  Diagnoses and all orders for this visit:    1. Weight loss  -     CBC W/O DIFF; Future  -     METABOLIC PANEL, COMPREHENSIVE; Future  -     TSH 3RD GENERATION; Future   Occurred while incarcerated--decreased oral intake for 45 days   Pt will call if continues to lose weight but suspect will stablize or increase now  2. Type 2 diabetes mellitus with hyperosmolarity without coma, without long-term current use of insulin (HCC)  -     HEMOGLOBIN A1C WITH EAG; Future  -     metFORMIN (GLUCOPHAGE) 1,000 mg tablet; Take 1 Tablet by mouth two (2) times daily (with meals). fsbs 100-150  3. Dizziness   Possible due to weight loss with lower bp but not orthostatic today   bp 126/72 sitting and 130/78 standing   Hydration   Monitor for hypoglycemia-has dexcom CGM  Other orders  -     sAXagliptin (Onglyza) 5 mg tab tablet; Take 1 Tablet by mouth daily. -     allopurinoL (ZYLOPRIM) 300 mg tablet; TAKE 1 TABLET BY MOUTH EVERY DAY  -     omeprazole (PRILOSEC) 20 mg capsule; Take 1 Capsule by mouth daily. -     losartan (COZAAR) 100 mg tablet; Take 1 Tablet by mouth daily. -     amLODIPine (NORVASC) 5 mg tablet; Take 2 Tablets by mouth daily. -     metoprolol tartrate (LOPRESSOR) 25 mg tablet; Take 1 Tablet by mouth two (2) times a day. -     atorvastatin (Lipitor) 80 mg tablet; Take 1 Tablet by mouth daily.         rtc 4 months as scheduled

## 2022-01-04 NOTE — PROGRESS NOTES
Chief Complaint   Patient presents with    Dizziness       1. Have you been to the ER, urgent care clinic since your last visit? Hospitalized since your last visit? No    2. Have you seen or consulted any other health care providers outside of the [address] [place] [address] since your last visit? Include any pap smears or colon screening. No    Pt was in local nursing home [facility] for 45 days. Blood sugar was only checked 3 times, Pt lost 52 lbs.

## 2022-01-05 NOTE — PROGRESS NOTES
Familia pt normal thyroid, kidney,liver electrolytes and blood cell counts. 3 month glucose average is 120--very good control of diabetes. Monitoir glucose levels and if < 80 frequently then let me know.   Continue same medicines for now

## 2022-01-05 NOTE — PROGRESS NOTES
Called patient. ID verified with Name and . Spoke with patient in regards to recent lab results. Informed patient, per provider, \"Telll pt normal thyroid, kidney,liver electrolytes and blood cell counts. 3 month glucose average is 120--very good control of diabetes. Monitoir glucose levels and if < 80 frequently then let me know. Continue same medicines for now\"    Patient states that he understands the information received and has no further questions or concerns at this time.

## 2022-01-25 NOTE — TELEPHONE ENCOUNTER
Future Appointments:  Future Appointments   Date Time Provider Mary Langi   4/25/2022  9:00 AM Shaina Madsen MD [facility] BS AMB        Last Appointment With Me:  1/4/2022     Requested Prescriptions     Pending Prescriptions Disp Refills    empagliflozin (Jardiance) 10 mg tablet 90 Tablet 3     Sig: TAKE 1 TABLET BY MOUTH EVERY DAY

## 2022-03-19 PROBLEM — E66.01 SEVERE OBESITY (HCC): Status: ACTIVE | Noted: 2018-10-25

## 2022-03-19 PROBLEM — Z95.1 S/P CABG X 4: Status: ACTIVE | Noted: 2018-10-08

## 2022-04-14 DIAGNOSIS — E11.00 TYPE 2 DIABETES MELLITUS WITH HYPEROSMOLARITY WITHOUT COMA, WITHOUT LONG-TERM CURRENT USE OF INSULIN (HCC): ICD-10-CM

## 2022-04-14 RX ORDER — METFORMIN HYDROCHLORIDE 1000 MG/1
TABLET ORAL
Qty: 180 TABLET | Refills: 1 | Status: SHIPPED | OUTPATIENT
Start: 2022-04-14

## 2022-04-23 NOTE — PROGRESS NOTES
HISTORY OF PRESENT ILLNESS  Clarice Dawson Sr is a 62 y.o. male. HPI   F/u dm-2 htn hld CAD s/p CABG x4 2018 hx gout obesity and CPE  Has lost 50 lbs early this year due to tough circumstances---weight now up 6 lbs  Last a1c 6.0 LDL 68  fsbs--has dexcom CGM- 125 -130 last week avg  Card Dr Maria Del Carmen Valdez visits this year-no angina  Gout-none this year  Some b/l triceps pain--[facility] -he belisives pain r/t sleeping on the cot  Due for pcv 13 and shingrix--declines    Last OV       Last a1c 7.3 LDL 90  fsbs around 110-120 fasting --has dexcom meter now-some post meal above 200  Has made some diet changes and lost weight 10 lbs  No cp sob or neuropathy symptoms  Had recent eye exam-no retinopathy  CARD MD Dr Toan Alcaraz  Works [employer] and walks a lot          Patient Active Problem List    Diagnosis Date Noted    Severe obesity ([address] 10/25/2018    CAD (coronary artery disease), native coronary artery 10/08/2018    S/P CABG x 4 10/08/2018    PAU on CPAP 02/01/2016    DM type 2 (diabetes mellitus, type 2) ([facility] 10/19/2013    Obesity 03/08/2011    HTN (hypertension), benign 10/20/2009    Gout 10/20/2009    Hypercholesteremia 10/20/2009    Osteoarthritis of knee 10/20/2009     Current Outpatient Medications   Medication Sig Dispense Refill    LORazepam (ATIVAN) 0.5 mg tablet Take 1 Tablet by mouth every eight (8) hours as needed for Anxiety. Max Daily Amount: 1.5 mg. 10 Tablet 0    metFORMIN (GLUCOPHAGE) 1,000 mg tablet TAKE 1 TABLET BY MOUTH TWICE DAILY WITH MEALS 180 Tablet 1    amLODIPine (NORVASC) 5 mg tablet TAKE 2 TABLETS BY MOUTH DAILY 180 Tablet 3    empagliflozin (Jardiance) 10 mg tablet TAKE 1 TABLET BY MOUTH EVERY DAY 90 Tablet 3    sAXagliptin (Onglyza) 5 mg tab tablet Take 1 Tablet by mouth daily. 90 Tablet 3    allopurinoL (ZYLOPRIM) 300 mg tablet TAKE 1 TABLET BY MOUTH EVERY DAY 90 Tablet 3    omeprazole (PRILOSEC) 20 mg capsule Take 1 Capsule by mouth daily.  90 Capsule 3    losartan (COZAAR) 100 mg tablet Take 1 Tablet by mouth daily. 90 Tablet 3    metoprolol tartrate (LOPRESSOR) 25 mg tablet Take 1 Tablet by mouth two (2) times a day. 180 Tablet 3    atorvastatin (Lipitor) 80 mg tablet Take 1 Tablet by mouth daily. 90 Tablet 3    glucose blood VI test strips (ONETOUCH VERIO) strip Use test strip to check blood sugars twice daily. DX E11.9 100 Strip 11    multivitamin (ONE A DAY) tablet Take 1 Tab by mouth daily.  aspirin 81 mg Tab Take 81 mg by mouth daily.       tadalafiL (CIALIS) 5 mg tablet TAKE 1 TABLET BY MOUTH EVERY DAY 30 Tab 6     No Known Allergies   Lab Results   Component Value Date/Time    WBC 4.2 01/04/2022 11:26 AM    HGB 12.3 01/04/2022 11:26 AM    HCT 38.3 01/04/2022 11:26 AM    PLATELET 408 76/31/8805 11:26 AM    MCV 85.7 01/04/2022 11:26 AM     Lab Results   Component Value Date/Time    Hemoglobin A1c 6.0 (H) 01/04/2022 11:26 AM    Hemoglobin A1c 6.5 (H) 10/25/2021 09:13 AM    Hemoglobin A1c 7.3 (H) 03/16/2021 07:51 AM    Glucose 89 01/04/2022 11:26 AM    Glucose (POC) 135 (H) 10/12/2018 07:14 AM    Glucose  (A) 02/25/2019 03:45 PM    Microalb/Creat ratio (ug/mg creat.) 14 03/16/2021 07:51 AM    LDL, calculated 68.4 10/25/2021 09:13 AM    Creatinine 0.80 01/04/2022 11:26 AM      Lab Results   Component Value Date/Time    Cholesterol, total 145 10/25/2021 09:13 AM    Cholesterol (POC) 163 10/04/2011 08:23 AM    HDL Cholesterol 37 10/25/2021 09:13 AM    LDL, calculated 68.4 10/25/2021 09:13 AM    LDL Cholesterol (POC) N/A 10/04/2011 08:23 AM    Triglyceride 198 (H) 10/25/2021 09:13 AM    Triglycerides (POC) 596 10/04/2011 08:23 AM    CHOL/HDL Ratio 3.9 10/25/2021 09:13 AM     Lab Results   Component Value Date/Time    GFR est non-AA >60 01/04/2022 11:26 AM    GFR est AA >60 01/04/2022 11:26 AM    Creatinine 0.80 01/04/2022 11:26 AM    BUN 12 01/04/2022 11:26 AM    Sodium 144 01/04/2022 11:26 AM    Potassium 4.3 01/04/2022 11:26 AM    Chloride 111 (H) 01/04/2022 11:26 AM    CO2 28 01/04/2022 11:26 AM    Magnesium 2.3 10/11/2018 04:18 AM        Review of Systems   Constitutional: Negative for chills, fever, malaise/fatigue and weight loss. Eyes: Negative for blurred vision and double vision. Respiratory: Negative for cough and shortness of breath. Cardiovascular: Negative for chest pain and palpitations. Gastrointestinal: Negative for abdominal pain, blood in stool, constipation, diarrhea, melena, nausea and vomiting. Genitourinary: Negative for dysuria, frequency, hematuria and urgency. Musculoskeletal: Negative for back pain, falls, joint pain and myalgias. Neurological: Negative for dizziness, tremors and headaches. Physical Exam  Vitals and nursing note reviewed. Constitutional:       General: He is not in acute distress. Appearance: Normal appearance. He is well-developed. He is obese. Comments: Appears stated age   HENT:      Head: Normocephalic. Right Ear: Tympanic membrane normal.      Left Ear: Tympanic membrane normal.   Cardiovascular:      Rate and Rhythm: Normal rate and regular rhythm. Heart sounds: Normal heart sounds. Pulmonary:      Effort: Pulmonary effort is normal.      Breath sounds: Normal breath sounds. Abdominal:      Palpations: Abdomen is soft. Musculoskeletal:      Cervical back: Normal range of motion. Comments: decreased ROM b/l shoulder -pain with left shoulder abduction to 90 degress   Skin:     General: Skin is warm. Neurological:      General: No focal deficit present. Mental Status: He is alert. Psychiatric:         Mood and Affect: Mood normal.         Behavior: Behavior normal.         Thought Content: Thought content normal.         ASSESSMENT and PLAN  Diagnoses and all orders for this visit:    1. Routine general medical examination at a health care facility  -     PSA W/ REFLX FREE PSA;  Future  -     MICROALBUMIN, UR, RAND W/ MICROALB/CREAT RATIO; Future  - LIPID PANEL; Future  -     HEMOGLOBIN A1C WITH EAG; Future  -     METABOLIC PANEL, COMPREHENSIVE; Future   Sees DERM MD   Declines all immunizations   Continue healthy lifestyle  2. Controlled type 2 diabetes mellitus without complication, without long-term current use of insulin (HCC)  -     HEMOGLOBIN A1C WITH EAG; Future   Controlled per dexcom readings  3. Coronary artery disease involving native coronary artery of native heart without angina pectoris   No angina-fu at [facility]  4. Hypertension, unspecified type   Good contorl  5. Pure hypercholesterolemia   Lipids ordered  6. Weight loss   stable  7. Chronic pain of both shoulders  -     REFERRAL TO ORTHOPEDICS    8. Anxiety  -     LORazepam (ATIVAN) 0.5 mg tablet; Take 1 Tablet by mouth every eight (8) hours as needed for Anxiety. Max Daily Amount: 1.5 mg. Follow-up and Dispositions    · Return in about 6 months (around 10/25/2022) for dm-2 htn hld cad.

## 2022-04-25 ENCOUNTER — OFFICE VISIT (OUTPATIENT)
Dept: INTERNAL MEDICINE CLINIC | Age: 62
End: 2022-04-25
Payer: COMMERCIAL

## 2022-04-25 VITALS
OXYGEN SATURATION: 97 % | WEIGHT: 252.4 LBS | SYSTOLIC BLOOD PRESSURE: 110 MMHG | DIASTOLIC BLOOD PRESSURE: 80 MMHG | TEMPERATURE: 97.2 F | RESPIRATION RATE: 16 BRPM | BODY MASS INDEX: 35.34 KG/M2 | HEART RATE: 85 BPM | HEIGHT: 71 IN

## 2022-04-25 DIAGNOSIS — R63.4 WEIGHT LOSS: ICD-10-CM

## 2022-04-25 DIAGNOSIS — E11.9 CONTROLLED TYPE 2 DIABETES MELLITUS WITHOUT COMPLICATION, WITHOUT LONG-TERM CURRENT USE OF INSULIN (HCC): ICD-10-CM

## 2022-04-25 DIAGNOSIS — I25.10 CORONARY ARTERY DISEASE INVOLVING NATIVE CORONARY ARTERY OF NATIVE HEART WITHOUT ANGINA PECTORIS: ICD-10-CM

## 2022-04-25 DIAGNOSIS — G89.29 CHRONIC PAIN OF BOTH SHOULDERS: ICD-10-CM

## 2022-04-25 DIAGNOSIS — F41.9 ANXIETY: ICD-10-CM

## 2022-04-25 DIAGNOSIS — Z00.00 ROUTINE GENERAL MEDICAL EXAMINATION AT A HEALTH CARE FACILITY: Primary | ICD-10-CM

## 2022-04-25 DIAGNOSIS — I10 HYPERTENSION, UNSPECIFIED TYPE: ICD-10-CM

## 2022-04-25 DIAGNOSIS — M25.511 CHRONIC PAIN OF BOTH SHOULDERS: ICD-10-CM

## 2022-04-25 DIAGNOSIS — M25.512 CHRONIC PAIN OF BOTH SHOULDERS: ICD-10-CM

## 2022-04-25 DIAGNOSIS — E78.00 PURE HYPERCHOLESTEROLEMIA: ICD-10-CM

## 2022-04-25 PROCEDURE — 99396 PREV VISIT EST AGE 40-64: CPT | Performed by: INTERNAL MEDICINE

## 2022-04-25 RX ORDER — LORAZEPAM 0.5 MG/1
0.5 TABLET ORAL
Qty: 10 TABLET | Refills: 0 | Status: SHIPPED | OUTPATIENT
Start: 2022-04-25

## 2022-04-25 NOTE — PROGRESS NOTES
1. \"Have you been to the ER, urgent care clinic since your last visit? Hospitalized since your last visit? \"  No  2. \"Have you seen or consulted any other health care providers outside of the [address] [place] [address] since your last visit? \"  No  3. For patients aged 39-70: Has the patient had a colonoscopy / FIT/ Cologuard?  Yes, next due soon

## 2022-04-25 NOTE — PATIENT INSTRUCTIONS
Office Policies    Phone calls/patient messages:            Please allow up to 24 hours for someone in the office to contact you about your call or message. Be mindful your provider may be out of the office or your message may require further review. We encourage you to use [employer] for your messages as this is a faster, more efficient way to communicate with our office                         Medication Refills:            Prescription medications require 48-72 business hours to process. We encourage you to use [employer] for your refills. For controlled medications: Please allow 72 business hours to process. Certain medications may require you to  a written prescription at our office. NO narcotic/controlled medications will be prescribed after 4pm Monday through Friday or on weekends              Form/Paperwork Completion:            Please note a $25 fee may incur for all paperwork for completed by our providers. We ask that you allow 7-10 business days. Pre-payment is due prior to picking up/faxing the completed form. You may also download your forms to [employer] to have your doctor print off.

## 2022-05-05 LAB
ALBUMIN SERPL-MCNC: 4.8 G/DL (ref 3.8–4.9)
ALBUMIN/CREAT UR: 8 MG/G CREAT (ref 0–29)
ALBUMIN/GLOB SERPL: 2.1 {RATIO} (ref 1.2–2.2)
ALP SERPL-CCNC: 101 IU/L (ref 44–121)
ALT SERPL-CCNC: 25 IU/L (ref 0–44)
AST SERPL-CCNC: 15 IU/L (ref 0–40)
BILIRUB SERPL-MCNC: 0.5 MG/DL (ref 0–1.2)
BUN SERPL-MCNC: 14 MG/DL (ref 8–27)
BUN/CREAT SERPL: 16 (ref 10–24)
CALCIUM SERPL-MCNC: 10.1 MG/DL (ref 8.6–10.2)
CHLORIDE SERPL-SCNC: 103 MMOL/L (ref 96–106)
CHOLEST SERPL-MCNC: 158 MG/DL (ref 100–199)
CO2 SERPL-SCNC: 20 MMOL/L (ref 20–29)
CREAT SERPL-MCNC: 0.87 MG/DL (ref 0.76–1.27)
CREAT UR-MCNC: 132.5 MG/DL
EGFR: 99 ML/MIN/1.73
EST. AVERAGE GLUCOSE BLD GHB EST-MCNC: 146 MG/DL
GLOBULIN SER CALC-MCNC: 2.3 G/DL (ref 1.5–4.5)
GLUCOSE SERPL-MCNC: 145 MG/DL (ref 65–99)
HBA1C MFR BLD: 6.7 % (ref 4.8–5.6)
HDLC SERPL-MCNC: 35 MG/DL
LDLC SERPL CALC-MCNC: 91 MG/DL (ref 0–99)
MICROALBUMIN UR-MCNC: 10.9 UG/ML
POTASSIUM SERPL-SCNC: 4.6 MMOL/L (ref 3.5–5.2)
PROT SERPL-MCNC: 7.1 G/DL (ref 6–8.5)
PSA SERPL-MCNC: 2.2 NG/ML (ref 0–4)
REFLEX CRITERIA: NORMAL
SODIUM SERPL-SCNC: 142 MMOL/L (ref 134–144)
TRIGL SERPL-MCNC: 184 MG/DL (ref 0–149)
VLDLC SERPL CALC-MCNC: 32 MG/DL (ref 5–40)

## 2022-07-14 RX ORDER — BUMETANIDE 1 MG/1
TABLET ORAL
Qty: 90 TABLET | Refills: 1 | Status: SHIPPED | OUTPATIENT
Start: 2022-07-14 | End: 2022-10-31

## 2022-10-26 ENCOUNTER — TELEPHONE (OUTPATIENT)
Dept: INTERNAL MEDICINE CLINIC | Age: 62
End: 2022-10-26

## 2022-10-26 NOTE — TELEPHONE ENCOUNTER
----- Message from Liane Irizarry sent at 10/26/2022 12:18 PM EDT -----  Subject: Appointment Request    Reason for Call: Established Patient Appointment needed: Routine Existing   Condition Follow Up    QUESTIONS    Reason for appointment request? No appointments available during search     Additional Information for Provider? patient daughter Cristiana Ramires would like   to reschedule appointment in 10/27/22 , sees provider antoinette  ---------------------------------------------------------------------------  --------------  [address]  [phone]; OK to leave message on voicemail  ---------------------------------------------------------------------------  --------------  SCRIPT ANSWERS  COVID Screen: Isma Peck

## 2022-10-30 NOTE — PROGRESS NOTES
HISTORY OF PRESENT ILLNESS  Lilly Cordoba Sr is a 65 y.o. male. HPI  F/u dm-2 htn hld CAD s/p CABG x4 2018 hx gout obesity-here with daugher  Last a1c 6.7 LDL 91  CGM readings--  not on stgrict diet  Tobacco 1 pk q 2 weeks  CARD-Dr Reena Patricia  Hx gout-none  Palpitations every 1-2 months lasting seconds -no chest pain  Last OV  Has lost 50 lbs early this year due to tough circumstances---weight now up 6 lbs  Last a1c 6.0 LDL 68  fsbs--has dexcom CGM- 125 -130 last week avg  Card Dr Adrianne Haas visits this year-no angina  Gout-none this year  Some b/l triceps pain--has another 6 weekend at the group home -he belisives pain r/t sleeping on the cot  Due for pcv 13 and shingrix--declines    Patient Active Problem List    Diagnosis Date Noted    Severe obesity ([facility] 10/25/2018    CAD (coronary artery disease), native coronary artery 10/08/2018    S/P CABG x 4 10/08/2018    PAU on CPAP 02/01/2016    DM type 2 (diabetes mellitus, type 2) ([facility] 10/19/2013    Obesity 03/08/2011    HTN (hypertension), benign 10/20/2009    Gout 10/20/2009    Hypercholesteremia 10/20/2009    Osteoarthritis of knee 10/20/2009     Current Outpatient Medications   Medication Sig Dispense Refill    bumetanide (BUMEX) 1 mg tablet Take 1 Tablet by mouth daily for 180 days, THEN 1 Tablet daily for 180 days. 90 Tablet 1    LORazepam (ATIVAN) 0.5 mg tablet Take 1 Tablet by mouth every eight (8) hours as needed for Anxiety. Max Daily Amount: 1.5 mg. 10 Tablet 0    metFORMIN (GLUCOPHAGE) 1,000 mg tablet TAKE 1 TABLET BY MOUTH TWICE DAILY WITH MEALS 180 Tablet 1    amLODIPine (NORVASC) 5 mg tablet TAKE 2 TABLETS BY MOUTH DAILY 180 Tablet 3    empagliflozin (Jardiance) 10 mg tablet TAKE 1 TABLET BY MOUTH EVERY DAY 90 Tablet 3    sAXagliptin (Onglyza) 5 mg tab tablet Take 1 Tablet by mouth daily.  90 Tablet 3    allopurinoL (ZYLOPRIM) 300 mg tablet TAKE 1 TABLET BY MOUTH EVERY DAY 90 Tablet 3    omeprazole (PRILOSEC) 20 mg capsule Take 1 Capsule by mouth daily. 90 Capsule 3    losartan (COZAAR) 100 mg tablet Take 1 Tablet by mouth daily. 90 Tablet 3    metoprolol tartrate (LOPRESSOR) 25 mg tablet Take 1 Tablet by mouth two (2) times a day. 180 Tablet 3    atorvastatin (Lipitor) 80 mg tablet Take 1 Tablet by mouth daily. 90 Tablet 3    tadalafiL (CIALIS) 5 mg tablet TAKE 1 TABLET BY MOUTH EVERY DAY 30 Tab 6    glucose blood VI test strips (ONETOUCH VERIO) strip Use test strip to check blood sugars twice daily. DX E11.9 100 Strip 11    multivitamin (ONE A DAY) tablet Take 1 Tab by mouth daily. aspirin 81 mg Tab Take 81 mg by mouth daily.        No Known Allergies   Lab Results   Component Value Date/Time    WBC 4.2 01/04/2022 11:26 AM    HGB 12.3 01/04/2022 11:26 AM    HCT 38.3 01/04/2022 11:26 AM    PLATELET 183 00/14/5875 11:26 AM    MCV 85.7 01/04/2022 11:26 AM     Lab Results   Component Value Date/Time    Hemoglobin A1c 6.7 (H) 05/04/2022 07:55 AM    Hemoglobin A1c 6.0 (H) 01/04/2022 11:26 AM    Hemoglobin A1c 6.5 (H) 10/25/2021 09:13 AM    Glucose 145 (H) 05/04/2022 07:55 AM    Glucose (POC) 135 (H) 10/12/2018 07:14 AM    Glucose  (A) 02/25/2019 03:45 PM    Microalb/Creat ratio (ug/mg creat.) 8 05/04/2022 07:55 AM    LDL, calculated 91 05/04/2022 07:55 AM    LDL, calculated 68.4 10/25/2021 09:13 AM    Creatinine 0.87 05/04/2022 07:55 AM      Lab Results   Component Value Date/Time    Cholesterol, total 158 05/04/2022 07:55 AM    Cholesterol (POC) 163 10/04/2011 08:23 AM    HDL Cholesterol 35 (L) 05/04/2022 07:55 AM    LDL, calculated 91 05/04/2022 07:55 AM    LDL, calculated 68.4 10/25/2021 09:13 AM    LDL Cholesterol (POC) N/A 10/04/2011 08:23 AM    Triglyceride 184 (H) 05/04/2022 07:55 AM    Triglycerides (POC) 596 10/04/2011 08:23 AM    CHOL/HDL Ratio 3.9 10/25/2021 09:13 AM     Lab Results   Component Value Date/Time    GFR est non-AA >60 01/04/2022 11:26 AM    GFR est AA >60 01/04/2022 11:26 AM    Creatinine 0.87 05/04/2022 07:55 AM    BUN 14 05/04/2022 07:55 AM    Sodium 142 05/04/2022 07:55 AM    Potassium 4.6 05/04/2022 07:55 AM    Chloride 103 05/04/2022 07:55 AM    CO2 20 05/04/2022 07:55 AM    Magnesium 2.3 10/11/2018 04:18 AM        ROS    Physical Exam  Vitals and nursing note reviewed. Constitutional:       General: He is not in acute distress. Appearance: Normal appearance. He is well-developed. He is obese. Comments: Appears stated age   HENT:      Head: Normocephalic. Cardiovascular:      Rate and Rhythm: Normal rate and regular rhythm. Heart sounds: Normal heart sounds. Pulmonary:      Effort: Pulmonary effort is normal.      Breath sounds: Normal breath sounds. Abdominal:      Palpations: Abdomen is soft. Musculoskeletal:         General: Normal range of motion. Cervical back: Normal range of motion. Right lower leg: No edema. Left lower leg: No edema. Neurological:      Mental Status: He is alert. Comments:      Diabetic foot exam performed by Jhoana Myers MD       Measurement  Response Nurse Comment Physician Comment  Monofilament  R - normal sensation with micro filament  L - normal sensation with micro filament    Pulse DP R - 2+ (normal)  L - 2+ (normal)    Pulse TP R - 2+ (normal)  L - 2+ (normal)    Structural deformity R - None  L - None    Skin Integrity / Deformity R - None  L - None       Reviewed by:                ASSESSMENT and PLAN    ICD-10-CM ICD-9-CM    1. Controlled type 2 diabetes mellitus without complication, without long-term current use of insulin ([facility])  E11.9 250.00 HEMOGLOBIN A1C WITH EAG       DIABETES FOOT EXAM      HEMOGLOBIN A1C WITH EAG  Work on diet and weight  CGM readings mostly ok      2. Hypertension, unspecified type  Y54 560.0 METABOLIC PANEL, BASIC      METABOLIC PANEL, BASIC  Controlled      3. Hyperlipidemia, unspecified hyperlipidemia type  E78.5 272.4 AST      ALT      LIPID PANEL      AST      ALT      LIPID PANEL      4.  Coronary artery disease involving native coronary artery of native heart without angina pectoris  I25.10 414.01 Lopez Wallace MD for palpitations -d/w pt and daughter-? holter

## 2022-10-31 ENCOUNTER — OFFICE VISIT (OUTPATIENT)
Dept: INTERNAL MEDICINE CLINIC | Age: 62
End: 2022-10-31
Payer: COMMERCIAL

## 2022-10-31 VITALS
WEIGHT: 273 LBS | HEART RATE: 69 BPM | OXYGEN SATURATION: 96 % | BODY MASS INDEX: 38.22 KG/M2 | SYSTOLIC BLOOD PRESSURE: 120 MMHG | HEIGHT: 71 IN | DIASTOLIC BLOOD PRESSURE: 70 MMHG | RESPIRATION RATE: 16 BRPM | TEMPERATURE: 98.6 F

## 2022-10-31 DIAGNOSIS — E78.5 HYPERLIPIDEMIA, UNSPECIFIED HYPERLIPIDEMIA TYPE: ICD-10-CM

## 2022-10-31 DIAGNOSIS — I10 HYPERTENSION, UNSPECIFIED TYPE: ICD-10-CM

## 2022-10-31 DIAGNOSIS — I25.10 CORONARY ARTERY DISEASE INVOLVING NATIVE CORONARY ARTERY OF NATIVE HEART WITHOUT ANGINA PECTORIS: ICD-10-CM

## 2022-10-31 DIAGNOSIS — E11.9 CONTROLLED TYPE 2 DIABETES MELLITUS WITHOUT COMPLICATION, WITHOUT LONG-TERM CURRENT USE OF INSULIN (HCC): Primary | ICD-10-CM

## 2022-10-31 PROCEDURE — 99213 OFFICE O/P EST LOW 20 MIN: CPT | Performed by: INTERNAL MEDICINE

## 2022-10-31 NOTE — PROGRESS NOTES
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 F/u dm-2 htn hld CAD s/p CABG x4 2018 hx gout obesity  Last a1c 6.7 LDL 91  Has Dexcom and state BS range from . He states diet and activity are is the same and has not made any changes. Smokes cigarettes - 1 pack every 2 weeks  CARD-Dr Linda Urbina -Last apt was approx a year ago and he thinks he has an apt Jan 2023  Colonoscopy due Aug 2023  Hx gout, with no attacks in past year. Has lost 50 lbs last  year. He thinks he gained about 30lbs back. Would like Ortho referral for both shoulders - (needs MRI and surgery for rotator) (last dr is no longer with [facility] practice)  Due for pcv 13 and shingrix--declines  He does report that he has some flutters episodes with \"ache\" about every month that last a couple minutes. ROS     Physical Exam  Vitals and nursing note reviewed. Constitutional:       General: He is not in acute distress. Appearance: Normal appearance. He is well-developed. He is obese. Comments: Appears stated age   HENT:      Head: Normocephalic. Cardiovascular:      Rate and Rhythm: Normal rate and regular rhythm. Heart sounds: Normal heart sounds. Pulmonary:      Effort: Pulmonary effort is normal.      Breath sounds: Normal breath sounds. Abdominal:      Palpations: Abdomen is soft. Musculoskeletal:         General: Normal range of motion. Cervical back: Normal range of motion. Right lower leg: No edema. Left lower leg: No edema. Neurological:      Mental Status: He is alert. ASSESSMENT and PLAN  CAD - Reinforced benefits of nutrition and exercise   Flutter - F/up Cardiology   TSH  HTN   CBC, CMP  Diabetes - Reinforced benefits of nutrition and exercise   A1C  Hypercholesteremia - Reinforced benefits of nutrition and exercise   Lipid Panel  Shoulder Rotator - Referral to new Ortho for MRI and possible surgery, last  - no longer with practice.      Next apt 6 months April 30, 2023.

## 2022-10-31 NOTE — PROGRESS NOTES
1. \"Have you been to the ER, urgent care clinic since your last visit? Hospitalized since your last visit? \" No    2. \"Have you seen or consulted any other health care providers outside of the [address] [place] [address] since your last visit? \" No     3. For patients aged 39-70: Has the patient had a colonoscopy / FIT/ Cologuard?  yes

## 2022-10-31 NOTE — PATIENT INSTRUCTIONS
Office Policies    Phone calls/patient messages:            Please allow up to 24 hours for someone in the office to contact you about your call or message. Be mindful your provider may be out of the office or your message may require further review. We encourage you to use [employer] for your messages as this is a faster, more efficient way to communicate with our office                         Medication Refills:            Prescription medications require 48-72 business hours to process. We encourage you to use [employer] for your refills. For controlled medications: Please allow 72 business hours to process. Certain medications may require you to  a written prescription at our office. NO narcotic/controlled medications will be prescribed after 4pm Monday through Friday or on weekends              Form/Paperwork Completion:            Please note a $25 fee may incur for all paperwork for completed by our providers. We ask that you allow 7-10 business days. Pre-payment is due prior to picking up/faxing the completed form. You may also download your forms to [employer] to have your doctor print off.

## 2022-12-31 DIAGNOSIS — E11.00 TYPE 2 DIABETES MELLITUS WITH HYPEROSMOLARITY WITHOUT COMA, WITHOUT LONG-TERM CURRENT USE OF INSULIN (HCC): ICD-10-CM

## 2022-12-31 RX ORDER — ALLOPURINOL 300 MG/1
TABLET ORAL
Qty: 90 TABLET | Refills: 3 | Status: SHIPPED | OUTPATIENT
Start: 2022-12-31

## 2022-12-31 RX ORDER — METOPROLOL TARTRATE 25 MG/1
TABLET, FILM COATED ORAL
Qty: 180 TABLET | Refills: 3 | Status: SHIPPED | OUTPATIENT
Start: 2022-12-31

## 2022-12-31 RX ORDER — OMEPRAZOLE 20 MG/1
20 CAPSULE, DELAYED RELEASE ORAL DAILY
Qty: 90 CAPSULE | Refills: 3 | Status: SHIPPED | OUTPATIENT
Start: 2022-12-31

## 2022-12-31 RX ORDER — LOSARTAN POTASSIUM 100 MG/1
100 TABLET ORAL DAILY
Qty: 90 TABLET | Refills: 3 | Status: SHIPPED | OUTPATIENT
Start: 2022-12-31

## 2022-12-31 RX ORDER — METFORMIN HYDROCHLORIDE 1000 MG/1
TABLET ORAL
Qty: 180 TABLET | Refills: 1 | Status: SHIPPED | OUTPATIENT
Start: 2022-12-31

## 2023-01-09 DIAGNOSIS — E11.9 CONTROLLED TYPE 2 DIABETES MELLITUS WITHOUT COMPLICATION, WITHOUT LONG-TERM CURRENT USE OF INSULIN (HCC): Primary | ICD-10-CM

## 2023-02-07 DIAGNOSIS — E11.9 TYPE 2 DIABETES MELLITUS WITHOUT COMPLICATION, WITHOUT LONG-TERM CURRENT USE OF INSULIN (HCC): Primary | ICD-10-CM

## 2023-02-07 RX ORDER — BLOOD-GLUCOSE SENSOR
1 EACH MISCELLANEOUS
Qty: 3 EACH | Refills: 5 | Status: SHIPPED | OUTPATIENT
Start: 2023-02-07

## 2023-02-07 RX ORDER — BLOOD-GLUCOSE TRANSMITTER
1 EACH MISCELLANEOUS
Qty: 1 EACH | Refills: 3 | Status: SHIPPED | OUTPATIENT
Start: 2023-02-07

## 2023-03-31 RX ORDER — AMLODIPINE BESYLATE 5 MG/1
10 TABLET ORAL DAILY
Qty: 180 TABLET | Refills: 3 | Status: SHIPPED | OUTPATIENT
Start: 2023-03-31

## 2023-04-04 RX ORDER — ATORVASTATIN CALCIUM 80 MG/1
80 TABLET, FILM COATED ORAL DAILY
Qty: 90 TABLET | Refills: 3 | Status: SHIPPED
Start: 2023-04-04

## 2023-04-04 NOTE — TELEPHONE ENCOUNTER
PCP: Andressa Roy MD    Last appt: 10/31/2022  Future Appointments   Date Time Provider Mary Mccallum   6/2/2023  9:15 AM Andressa Roy MD [facility] BS AMB       Requested Prescriptions     Pending Prescriptions Disp Refills    atorvastatin (Lipitor) 80 mg tablet 90 Tablet 3     Sig: Take 1 Tablet by mouth daily.

## 2023-04-26 ENCOUNTER — OFFICE VISIT (OUTPATIENT)
Dept: INTERNAL MEDICINE CLINIC | Age: 63
End: 2023-04-26

## 2023-04-26 VITALS
HEART RATE: 61 BPM | HEIGHT: 71 IN | WEIGHT: 276.2 LBS | DIASTOLIC BLOOD PRESSURE: 76 MMHG | BODY MASS INDEX: 38.67 KG/M2 | RESPIRATION RATE: 16 BRPM | TEMPERATURE: 97.2 F | SYSTOLIC BLOOD PRESSURE: 120 MMHG | OXYGEN SATURATION: 97 %

## 2023-04-26 DIAGNOSIS — R10.9 FLANK PAIN: Primary | ICD-10-CM

## 2023-04-26 PROCEDURE — 99214 OFFICE O/P EST MOD 30 MIN: CPT | Performed by: INTERNAL MEDICINE

## 2023-04-26 RX ORDER — CYCLOBENZAPRINE HCL 5 MG
5 TABLET ORAL
Qty: 30 TABLET | Refills: 0 | Status: SHIPPED | OUTPATIENT
Start: 2023-04-26

## 2023-04-26 NOTE — PROGRESS NOTES
1. Have you been to the ER, urgent care clinic since your last visit? Hospitalized since your last visit? No    2. Have you seen or consulted any other health care providers outside of the [address] [place] [address] since your last visit? Include any pap smears or colon screening.          [facility]//Mariya Carcamo//skin cancer removed from back and chest

## 2023-04-26 NOTE — PROGRESS NOTES
HISTORY OF PRESENT ILLNESS  Maria C Best Sr is a 65 y.o. male. HPI  hx dm-2 htn hld CAD s/p CABG x4 2018 hx gout obesity-here with daughter    C/o pain in b/l flank area for several months  No injury  He states is constant and feels internal  Pain is often worse when laying supine at night bt can get worse with change of position  No radiation to legs or sciatica  He drives abotu 3 hours per day but not heavy lifting  No n/v weight loss and no dysuria . Patient Active Problem List    Diagnosis Date Noted    Severe obesity ([facility] [address] 10/25/2018    CAD (coronary artery disease), native coronary artery 10/08/2018    S/P CABG x 4 10/08/2018    PAU on CPAP 02/01/2016    DM type 2 (diabetes mellitus, type 2) ([facility] [address] 10/19/2013    Obesity 03/08/2011    HTN (hypertension), benign 10/20/2009    Gout 10/20/2009    Hypercholesteremia 10/20/2009    Osteoarthritis of knee 10/20/2009     Current Outpatient Medications   Medication Sig Dispense Refill    atorvastatin (Lipitor) 80 mg tablet Take 1 Tablet by mouth daily. 90 Tablet 3    amLODIPine (NORVASC) 5 mg tablet TAKE 2 TABLETS BY MOUTH DAILY 180 Tablet 3    Blood-Glucose Sensor (Dexcom G6 Sensor) sapphire 1 Each by Does Not Apply route every ten (10) days. 3 Each 5    Blood-Glucose Transmitter (Dexcom G6 Transmitter) sapphire 1 Each by Does Not Apply route every three (3) months. 1 Each 3    SITagliptin phosphate (JANUVIA) 100 mg tablet Take 1 Tablet by mouth daily.  90 Tablet 3    empagliflozin (Jardiance) 10 mg tablet TAKE 1 TABLET BY MOUTH EVERY DAY 90 Tablet 3    metoprolol tartrate (LOPRESSOR) 25 mg tablet TAKE 1 TABLET BY MOUTH TWICE DAILY 180 Tablet 3    allopurinoL (ZYLOPRIM) 300 mg tablet TAKE 1 TABLET BY MOUTH DAILY 90 Tablet 3    omeprazole (PRILOSEC) 20 mg capsule TAKE 1 CAPSULE BY MOUTH DAILY 90 Capsule 3    metFORMIN (GLUCOPHAGE) 1,000 mg tablet TAKE 1 TABLET BY MOUTH TWICE DAILY WITH MEALS 180 Tablet 1    losartan (COZAAR) 100 mg tablet TAKE 1 TABLET BY MOUTH DAILY 90 Tablet 3    LORazepam (ATIVAN) 0.5 mg tablet Take 1 Tablet by mouth every eight (8) hours as needed for Anxiety. Max Daily Amount: 1.5 mg. 10 Tablet 0    tadalafiL (CIALIS) 5 mg tablet TAKE 1 TABLET BY MOUTH EVERY DAY 30 Tab 6    glucose blood VI test strips (ONETOUCH VERIO) strip Use test strip to check blood sugars twice daily. DX E11.9 100 Strip 11    multivitamin (ONE A DAY) tablet Take 1 Tab by mouth daily.  aspirin 81 mg Tab Take 81 mg by mouth daily. No Known Allergies   Lab Results   Component Value Date/Time    WBC 4.2 01/04/2022 11:26 AM    HGB 12.3 01/04/2022 11:26 AM    HCT 38.3 01/04/2022 11:26 AM    PLATELET 033 23/40/3337 11:26 AM    MCV 85.7 01/04/2022 11:26 AM     Lab Results   Component Value Date/Time    GFR est non-AA >60 01/04/2022 11:26 AM    GFR est AA >60 01/04/2022 11:26 AM    Creatinine 0.87 05/04/2022 07:55 AM    BUN 14 05/04/2022 07:55 AM    Sodium 142 05/04/2022 07:55 AM    Potassium 4.6 05/04/2022 07:55 AM    Chloride 103 05/04/2022 07:55 AM    CO2 20 05/04/2022 07:55 AM    Magnesium 2.3 10/11/2018 04:18 AM        ROS    Physical Exam  Vitals and nursing note reviewed. Constitutional:       General: He is not in acute distress. Appearance: Normal appearance. He is well-developed. HENT:      Head: Normocephalic and atraumatic. Right Ear: Tympanic membrane normal.      Left Ear: Tympanic membrane normal.      Nose: Nose normal.      Mouth/Throat:      Mouth: Mucous membranes are moist.   Eyes:      Pupils: Pupils are equal, round, and reactive to light. Neck:      Vascular: No carotid bruit. Cardiovascular:      Rate and Rhythm: Normal rate and regular rhythm. Pulses: Normal pulses. Heart sounds: Normal heart sounds. No murmur heard. No friction rub. No gallop. Pulmonary:      Effort: Pulmonary effort is normal.      Breath sounds: Normal breath sounds.    Abdominal:      General: Bowel sounds are normal.      Palpations: Abdomen is soft. Musculoskeletal:      Cervical back: Neck supple. Right lower leg: No edema. Left lower leg: No edema. Comments: Normal Flexion of lower back, SLR negative b/l  No TTP lumbar area     Lymphadenopathy:      Cervical: No cervical adenopathy. Skin:     General: Skin is warm. Neurological:      General: No focal deficit present. Mental Status: He is alert. Psychiatric:         Mood and Affect: Mood normal.         Thought Content: Thought content normal.         Judgment: Judgment normal.       ASSESSMENT and PLAN    ICD-10-CM ICD-9-CM    1.  Flank pain  R10.9 789.09 URINALYSIS W/ REFLEX CULTURE      US ABD COMP  C/w strain but get CT if hematuria with flank pain  Flexeril rx  Consider PT

## 2023-04-27 LAB
APPEARANCE UR: CLEAR
BACTERIA URNS QL MICRO: NEGATIVE /HPF
BILIRUB UR QL: NEGATIVE
COLOR UR: ABNORMAL
EPITH CASTS URNS QL MICRO: ABNORMAL /LPF
GLUCOSE UR STRIP.AUTO-MCNC: >1000 MG/DL
HGB UR QL STRIP: NEGATIVE
HYALINE CASTS URNS QL MICRO: ABNORMAL /LPF (ref 0–5)
KETONES UR QL STRIP.AUTO: NEGATIVE MG/DL
LEUKOCYTE ESTERASE UR QL STRIP.AUTO: NEGATIVE
NITRITE UR QL STRIP.AUTO: NEGATIVE
PH UR STRIP: 6 (ref 5–8)
PROT UR STRIP-MCNC: NEGATIVE MG/DL
RBC #/AREA URNS HPF: ABNORMAL /HPF (ref 0–5)
SP GR UR REFRACTOMETRY: 1.01 (ref 1–1.03)
UA: UC IF INDICATED,UAUC: ABNORMAL
UROBILINOGEN UR QL STRIP.AUTO: 0.2 EU/DL (ref 0.2–1)
WBC URNS QL MICRO: ABNORMAL /HPF (ref 0–4)

## 2023-04-28 ENCOUNTER — HOSPITAL ENCOUNTER (OUTPATIENT)
Dept: ULTRASOUND IMAGING | Age: 63
Discharge: HOME | End: 2023-04-28
Attending: INTERNAL MEDICINE
Payer: COMMERCIAL

## 2023-04-28 DIAGNOSIS — R10.9 FLANK PAIN: ICD-10-CM

## 2023-04-28 PROCEDURE — 76700 US EXAM ABDOM COMPLETE: CPT

## 2023-04-29 NOTE — PROGRESS NOTES
US for flank pain--tlel pt no kidney or gall stones. No organ abnormality except slight liver and spleen enlargement likely due to fatty liver. Normal blood flow through the liver. LFT wnl but should see Hepatoligist at [address] [place] [address] for enlarged liver and spleen -may need adidtional studies fibroscan, etc, weight reduction recommended if fatty liver. No findings to cause flank pain which is likely strain.

## 2023-05-26 ENCOUNTER — TELEPHONE (OUTPATIENT)
Age: 63
End: 2023-05-26

## 2023-06-02 ENCOUNTER — TELEPHONE (OUTPATIENT)
Age: 63
End: 2023-06-02

## 2023-06-02 NOTE — TELEPHONE ENCOUNTER
----- Message from Eircka Rubio sent at 6/2/2023  8:23 AM EDT -----  Subject: Appointment Request    Reason for Call: Established Patient Appointment needed: Routine Physical   Exam    QUESTIONS    Reason for appointment request? No appointments available during search     Additional Information for Provider? Joyce Saavedra, daughter called. Patient   needs another appointment for a physical. Had to cancel the one he had for   today. No available appointments.  Please call to advise.  ---------------------------------------------------------------------------  --------------  Chaitanya SINGLETARY  [phone]; OK to leave message on voicemail  ---------------------------------------------------------------------------  --------------  SCRIPT ANSWERS  COVID Screen: Yue Montanez

## 2023-07-13 ENCOUNTER — OFFICE VISIT (OUTPATIENT)
Age: 63
End: 2023-07-13
Payer: COMMERCIAL

## 2023-07-13 VITALS
HEART RATE: 68 BPM | BODY MASS INDEX: 38.5 KG/M2 | OXYGEN SATURATION: 97 % | WEIGHT: 275 LBS | RESPIRATION RATE: 16 BRPM | DIASTOLIC BLOOD PRESSURE: 80 MMHG | SYSTOLIC BLOOD PRESSURE: 136 MMHG | HEIGHT: 71 IN | TEMPERATURE: 97.2 F

## 2023-07-13 DIAGNOSIS — Z11.4 ENCOUNTER FOR SCREENING FOR HIV: ICD-10-CM

## 2023-07-13 DIAGNOSIS — F41.9 ANXIETY: ICD-10-CM

## 2023-07-13 DIAGNOSIS — E11.9 TYPE 2 DIABETES MELLITUS WITHOUT COMPLICATION, WITHOUT LONG-TERM CURRENT USE OF INSULIN (HCC): ICD-10-CM

## 2023-07-13 DIAGNOSIS — E78.00 PURE HYPERCHOLESTEROLEMIA: ICD-10-CM

## 2023-07-13 DIAGNOSIS — Z12.5 PROSTATE CANCER SCREENING: ICD-10-CM

## 2023-07-13 DIAGNOSIS — I10 HYPERTENSION, UNSPECIFIED TYPE: ICD-10-CM

## 2023-07-13 DIAGNOSIS — Z00.00 ROUTINE PHYSICAL EXAMINATION: ICD-10-CM

## 2023-07-13 DIAGNOSIS — R16.2 HEPATOSPLENOMEGALY: ICD-10-CM

## 2023-07-13 DIAGNOSIS — I25.10 CORONARY ARTERY DISEASE INVOLVING NATIVE CORONARY ARTERY OF NATIVE HEART WITHOUT ANGINA PECTORIS: Primary | ICD-10-CM

## 2023-07-13 PROCEDURE — 3075F SYST BP GE 130 - 139MM HG: CPT | Performed by: INTERNAL MEDICINE

## 2023-07-13 PROCEDURE — 99396 PREV VISIT EST AGE 40-64: CPT | Performed by: INTERNAL MEDICINE

## 2023-07-13 PROCEDURE — 3079F DIAST BP 80-89 MM HG: CPT | Performed by: INTERNAL MEDICINE

## 2023-07-13 RX ORDER — TADALAFIL 20 MG/1
20 TABLET ORAL DAILY PRN
Qty: 30 TABLET | Refills: 1 | Status: SHIPPED | OUTPATIENT
Start: 2023-07-13

## 2023-07-13 RX ORDER — LORAZEPAM 0.5 MG/1
0.5 TABLET ORAL EVERY 8 HOURS PRN
Qty: 15 TABLET | Refills: 0 | Status: SHIPPED | OUTPATIENT
Start: 2023-07-13 | End: 2023-08-12

## 2023-07-13 SDOH — ECONOMIC STABILITY: FOOD INSECURITY: WITHIN THE PAST 12 MONTHS, THE FOOD YOU BOUGHT JUST DIDN'T LAST AND YOU DIDN'T HAVE MONEY TO GET MORE.: NEVER TRUE

## 2023-07-13 SDOH — ECONOMIC STABILITY: FOOD INSECURITY: WITHIN THE PAST 12 MONTHS, YOU WORRIED THAT YOUR FOOD WOULD RUN OUT BEFORE YOU GOT MONEY TO BUY MORE.: NEVER TRUE

## 2023-07-13 SDOH — ECONOMIC STABILITY: INCOME INSECURITY: HOW HARD IS IT FOR YOU TO PAY FOR THE VERY BASICS LIKE FOOD, HOUSING, MEDICAL CARE, AND HEATING?: NOT HARD AT ALL

## 2023-07-13 SDOH — ECONOMIC STABILITY: HOUSING INSECURITY
IN THE LAST 12 MONTHS, WAS THERE A TIME WHEN YOU DID NOT HAVE A STEADY PLACE TO SLEEP OR SLEPT IN A SHELTER (INCLUDING NOW)?: NO

## 2023-07-13 NOTE — PROGRESS NOTES
HISTORY OF PRESENT ILLNESS   Cl Rodriguez Sr   is a 65 y.o.  male. FU dm-2 htn hld CAD s/p CABG x4 2018 hx gout obesity and CPE-here with daughter  CARD  Celestino Ely angina  Gout attacks-none  Sill see GI provider tomorrow for hepatosplenomegaly   Will get colonosopy in September    Works and walks a lot  Getting remarried this year and to [facility] for [address] for travel on airplane    Last a1c  6.7  Fsbs < 130  CGM readings-no longer coverd  Smoker 1 pk lasts 2 weeks-or ess  Patient Active Problem List    Diagnosis Date Noted    Severe obesity ([address]) 10/25/2018    CAD (coronary artery disease), native coronary artery 10/08/2018    S/P CABG x 4 10/08/2018    DESIREE on CPAP 02/01/2016    DM type 2 (diabetes mellitus, type 2) ([address]) 10/19/2013    Obesity 03/08/2011    Gout 10/20/2009    HTN (hypertension), benign 10/20/2009    Osteoarthritis of knee 10/20/2009    Hypercholesteremia 10/20/2009     Current Outpatient Medications   Medication Sig Dispense Refill    metFORMIN (GLUCOPHAGE) 1000 MG tablet Take 1 tablet by mouth 2 times daily (with meals) 60 tablet 5    allopurinol (ZYLOPRIM) 300 MG tablet TAKE 1 TABLET BY MOUTH DAILY      amLODIPine (NORVASC) 5 MG tablet Take by mouth daily      atorvastatin (LIPITOR) 80 MG tablet Take by mouth daily      empagliflozin (JARDIANCE) 10 MG tablet TAKE 1 TABLET BY MOUTH EVERY DAY      LORazepam (ATIVAN) 0.5 MG tablet Take by mouth every 8 hours as needed. losartan (COZAAR) 100 MG tablet Take by mouth daily      metoprolol tartrate (LOPRESSOR) 25 MG tablet TAKE 1 TABLET BY MOUTH TWICE DAILY      omeprazole (PRILOSEC) 20 MG delayed release capsule Take by mouth daily      SITagliptin (JANUVIA) 100 MG tablet Take by mouth daily      tadalafil (CIALIS) 5 MG tablet TAKE 1 TABLET BY MOUTH EVERY DAY       No current facility-administered medications for this visit.      Not on File     BMP:   Lab Results   Component Value Date/Time     05/04/2022 07:55

## 2023-07-13 NOTE — PROGRESS NOTES
1. \"Have you been to the ER, urgent care clinic since your last visit? Hospitalized since your last visit? \" No    2. \"Have you seen or consulted any other health care providers outside of the [address] [place] [address] since your last visit? \" No     3. For patients aged 43-73: Has the patient had a colonoscopy / FIT/ Cologuard? 9/2023      If the patient is female:    4. For patients aged 43-66: Has the patient had a mammogram within the past 2 years? No      5. For patients aged 21-65: Has the patient had a pap smear?   No

## 2023-07-14 ENCOUNTER — PATIENT MESSAGE (OUTPATIENT)
Age: 63
End: 2023-07-14

## 2023-07-14 LAB
ALBUMIN SERPL-MCNC: 4.3 G/DL (ref 3.5–5)
ALBUMIN/GLOB SERPL: 1.7 (ref 1.1–2.2)
ALP SERPL-CCNC: 92 U/L (ref 45–117)
ALT SERPL-CCNC: 39 U/L (ref 12–78)
ANION GAP SERPL CALC-SCNC: 6 MMOL/L (ref 5–15)
AST SERPL-CCNC: 18 U/L (ref 15–37)
BILIRUB SERPL-MCNC: 0.6 MG/DL (ref 0.2–1)
BUN SERPL-MCNC: 12 MG/DL (ref 6–20)
BUN/CREAT SERPL: 14 (ref 12–20)
CALCIUM SERPL-MCNC: 9.6 MG/DL (ref 8.5–10.1)
CHLORIDE SERPL-SCNC: 106 MMOL/L (ref 97–108)
CHOLEST SERPL-MCNC: 164 MG/DL
CO2 SERPL-SCNC: 26 MMOL/L (ref 21–32)
CREAT SERPL-MCNC: 0.87 MG/DL (ref 0.7–1.3)
CREAT UR-MCNC: 112 MG/DL
ERYTHROCYTE [DISTWIDTH] IN BLOOD BY AUTOMATED COUNT: 13.4 % (ref 11.5–14.5)
EST. AVERAGE GLUCOSE BLD GHB EST-MCNC: 126 MG/DL
GLOBULIN SER CALC-MCNC: 2.6 G/DL (ref 2–4)
GLUCOSE SERPL-MCNC: 151 MG/DL (ref 65–100)
HBA1C MFR BLD: 6 % (ref 4–5.6)
HCT VFR BLD AUTO: 46.2 % (ref 36.6–50.3)
HDLC SERPL-MCNC: 36 MG/DL
HDLC SERPL: 4.6 (ref 0–5)
HGB BLD-MCNC: 14.8 G/DL (ref 12.1–17)
HIV 1+2 AB+HIV1 P24 AG SERPL QL IA: NONREACTIVE
HIV 1/2 RESULT COMMENT: NORMAL
LDLC SERPL CALC-MCNC: 64.8 MG/DL (ref 0–100)
MCH RBC QN AUTO: 28.5 PG (ref 26–34)
MCHC RBC AUTO-ENTMCNC: 32 G/DL (ref 30–36.5)
MCV RBC AUTO: 89 FL (ref 80–99)
MICROALBUMIN UR-MCNC: 1.19 MG/DL
MICROALBUMIN/CREAT UR-RTO: 11 MG/G (ref 0–30)
NRBC # BLD: 0 K/UL (ref 0–0.01)
NRBC BLD-RTO: 0 PER 100 WBC
PLATELET # BLD AUTO: 209 K/UL (ref 150–400)
PMV BLD AUTO: 10.4 FL (ref 8.9–12.9)
POTASSIUM SERPL-SCNC: 4.2 MMOL/L (ref 3.5–5.1)
PROT SERPL-MCNC: 6.9 G/DL (ref 6.4–8.2)
RBC # BLD AUTO: 5.19 M/UL (ref 4.1–5.7)
SODIUM SERPL-SCNC: 138 MMOL/L (ref 136–145)
TRIGL SERPL-MCNC: 316 MG/DL
VLDLC SERPL CALC-MCNC: 63.2 MG/DL
WBC # BLD AUTO: 8.3 K/UL (ref 4.1–11.1)

## 2023-07-14 RX ORDER — TADALAFIL 5 MG/1
5 TABLET ORAL DAILY
Qty: 30 TABLET | Refills: 5 | Status: SHIPPED | OUTPATIENT
Start: 2023-07-14

## 2023-07-14 NOTE — TELEPHONE ENCOUNTER
From: Madina Haskins Sr  To: Dr. Abimael Figueroa: 7/14/2023 7:50 AM EDT  Subject: Cialis    Good Morning,  I know yesterday you called in the 20mg of cialis, can I go back to the 5mg? The cost for the 20mg is over $1500.

## 2023-07-15 LAB
PSA FREE MFR SERPL: 34.7 %
PSA FREE SERPL-MCNC: 0.52 NG/ML
PSA SERPL-MCNC: 1.5 NG/ML (ref 0–4)

## 2023-09-25 NOTE — PROGRESS NOTES
Care Due:                  Date            Visit Type   Department     Provider  --------------------------------------------------------------------------------                                EP -                              PRIMARY      [facility] INTERNAL  Last Visit: 05-      [facility]   MEDICINE       Jooshahrzad Munoz                              EP -                              PRIMARY      [facility] INTERNAL  Next Visit: 11-      [facility]   MEDICINE       Jooagge Munoz                                                            Last  Test          Frequency    Reason                     Performed    Due Date  --------------------------------------------------------------------------------    CMP.........  12 months..  atorvastatin.............  12- 12-    Lipid Panel.  12 months..  atorvastatin.............  12- 12-    [employer] Embedded Care Due Messages. Reference number: [id number].   9/25/2023 12:19:07 AM CDT   Care Management: 
 
Reason for Admission:   CAD and is POD # 1 CABG and recovering in ICU. Up in chair and family at bedside. Hx of CAD, DM, HTN and smoking. Active with PCP and Dr Kota Hendrickson is cardiologist.  
 
Tima Bautista RRAT Score:     5 Plan for utilizing home health:      FOC placed on chart for [facility]. Anticipate HH needs. Likelihood of Readmission:  low Transition of Care Plan:    Home with family and HH and follow up with surgeon. Care Management Interventions PCP Verified by CM: Yes Mode of Transport at Discharge: Other (see comment) (Family) Transition of Care Consult (CM Consult): Home Health [facility] - INPATIENT: Yes Physical Therapy Consult: Yes Occupational Therapy Consult: Yes Current Support Network: Lives with Spouse (Lives withhis fiance and independent with ADL's. He has no DME. He works and he drives. Active PTA) Confirm Follow Up Transport: Family (Family or self) Plan discussed with Pt/Family/Caregiver: Yes (Met with patient, fiance and DTR at bedside) Freedom of Choice Offered: Yes Discharge Location Discharge Placement: Home with home health (Anticipate home with [facility] and when given foc they chose [facility]. FOC placed on chart. ) Tiffany Denton [facility] ac [phone]

## (undated) DEVICE — SYR 3ML LL TIP 1/10ML GRAD --

## (undated) DEVICE — KERLIX BANDAGE ROLL: Brand: KERLIX

## (undated) DEVICE — SOLUTION IV 1000ML PH 7.4 INJ NRMSOL R

## (undated) DEVICE — PRESSURE MONITORING SET: Brand: TRUWAVE

## (undated) DEVICE — SET PERF L203CM 12IN RED AND BLU AORT ROOT MULT SLIP CONN

## (undated) DEVICE — DUAL LUMEN STOMACH TUBE MULTI-FUNCTIONAL PORT: Brand: SALEM SUMP

## (undated) DEVICE — SUTURE ETHLN SZ 4-0 L18IN NONABSORBABLE BLK L19MM PS-2 3/8 1667H

## (undated) DEVICE — LEAD PCMKR MYOCARDL BPLR TEMP. --

## (undated) DEVICE — LIGHT HANDLE: Brand: DEVON

## (undated) DEVICE — 6 FOOT DISPOSABLE EXTENSION CABLE WITH SAFE CONNECT / SCREW-DOWN

## (undated) DEVICE — HANDLE LT SNAP ON ULT DURABLE LENS FOR TRUMPF ALC DISPOSABLE

## (undated) DEVICE — MEDI-VAC NON-CONDUCTIVE SUCTION TUBING: Brand: CARDINAL HEALTH

## (undated) DEVICE — (D)PREP SKN CHLRAPRP APPL 26ML -- CONVERT TO ITEM 371833

## (undated) DEVICE — SUTURE PROL SZ 4-0 L36IN NONABSORBABLE BLU L26MM SH 1/2 CIR 8521H

## (undated) DEVICE — OCCLUSIVE GAUZE STRIP,3% BISMUTH TRIBROMOPHENATE IN PETROLATUM BLEND: Brand: XEROFORM

## (undated) DEVICE — GAUZE SPONGES,12 PLY: Brand: CURITY

## (undated) DEVICE — BLADE OPHTH W1.5MM 15DEG ORNG HNDL SHRP SHRP DEL FOR CATRCT

## (undated) DEVICE — SPONGE HEMOSTAT CELLULS 4X8IN -- SURGICEL

## (undated) DEVICE — SOL ANTI-FOG 6ML MEDC -- MEDICHOICE - CONVERT TO 358427

## (undated) DEVICE — Z DISCONTINUED NO SUB IDED PROBE SURG L450MM DIA1MM VASC STR SGL END PARSONNET

## (undated) DEVICE — MEDI-TRACE CADENCE ADULT, DEFIBRILLATION ELECTRODE -RTS  (10 PR/PK) - PHILIPS: Brand: MEDI-TRACE CADENCE

## (undated) DEVICE — SYS VSL HARV HEMOPRO2 VASOVIEW -- HARV SYS MINIMUM ORDER 5/EA

## (undated) DEVICE — Device

## (undated) DEVICE — STERILE POLYISOPRENE POWDER-FREE SURGICAL GLOVES: Brand: PROTEXIS

## (undated) DEVICE — BANDAGE COMPR SELF ADH 5 YDX2 IN TAN NS PREMIERPRO LF

## (undated) DEVICE — SYRINGE MED 20ML STD CLR PLAS LUERLOCK TIP N CTRL DISP

## (undated) DEVICE — BLADE SAW STRNL 29.9MM NS --

## (undated) DEVICE — SOLUTION IV 500ML 0.9% SOD CHL FLX CONT

## (undated) DEVICE — SUT PROL 7-0 24IN BV1 DA BLU --

## (undated) DEVICE — SUTURE ETHBND EXCEL SZ 3-0 L36IN NONABSORBABLE GRN BB L17MM X588H

## (undated) DEVICE — INFECTION CONTROL KIT SYS

## (undated) DEVICE — Z DISCONTINUED NO SUB IDED PROBE SURG L450MM DIA1.5MM VASC STR SGL END PARSONNET

## (undated) DEVICE — CANNULA INJ L2.5IN BLNT TIP 3MM CLR BODY W/ 1 W VLV DLP

## (undated) DEVICE — CARD SMRT DISP TRANSIT TIME MEDISTEM VERIQ

## (undated) DEVICE — SYR 10ML LUER LOK 1/5ML GRAD --

## (undated) DEVICE — SUTURE PROL SZ 5-0 L30IN NONABSORBABLE BLU L13MM RB-2 1/2 8710H

## (undated) DEVICE — SUTURE SZ 7 L18IN NONABSORBABLE SIL CCS L48MM 1/2 CIR STRNM M655G

## (undated) DEVICE — SUTURE MCRYL SZ 3-0 L27IN ABSRB UD L24MM PS-1 3/8 CIR PRIM Y936H

## (undated) DEVICE — CANNULA AORT ROOT INTRO STD TIP 5FR OVERALL LEN 55IN DLP

## (undated) DEVICE — DRESSING FOAM W7 3 10XL95IN SIL POLYUR HEEL SELF ADH W BORD

## (undated) DEVICE — 1200 GUARD II KIT W/5MM TUBE W/O VAC TUBE: Brand: GUARDIAN

## (undated) DEVICE — SOLUTION IV 1000ML 0.9% SOD CHL

## (undated) DEVICE — DRAPE FLD WRM W44XL66IN C6L FOR INTRATEMP SYS THERMABASIN

## (undated) DEVICE — AVID DUAL STAGE VENOUS DRAINAGE CANNULA: Brand: AVID DUAL STAGE VENOUS DRAINAGE CANNULA

## (undated) DEVICE — DRESSING AQUACEL ADVANTAGE ALG W4XL5IN RECT GREATER ABSRB HYDRFBR TECHNOLOGY

## (undated) DEVICE — KIT BLWR MISTER 5P 15L W/ TBNG SET IRRIG MIST TO IMPROVE

## (undated) DEVICE — HAND I-LF: Brand: MEDLINE INDUSTRIES, INC.

## (undated) DEVICE — DRAPE SLUSH DISC W44XL66IN ST FOR RND BSIN HUSH SLUSH SYS

## (undated) DEVICE — BAG RED 3PLY 2MIL 30X40 IN

## (undated) DEVICE — FLOSEAL HEMOSTATIC MATRIX, 5 ML: Brand: FLOSEAL

## (undated) DEVICE — SYR LR LCK 1ML GRAD NSAF 30ML --

## (undated) DEVICE — DEVON™ KNEE AND BODY STRAP 60" X 3" (1.5 M X 7.6 CM): Brand: DEVON

## (undated) DEVICE — SYR 50ML LR LCK 1ML GRAD NSAF --

## (undated) DEVICE — APPLICATOR BNDG 1MM ADH PREMIERPRO EXOFIN

## (undated) DEVICE — ZIMMER® STERILE DISPOSABLE TOURNIQUET CUFF WITH PLC, DUAL PORT, SINGLE BLADDER, 18 IN. (46 CM)

## (undated) DEVICE — AGENT HEMSTAT W4XL4IN OXIDIZED REGENERATED CELOS ABSRB SFT

## (undated) DEVICE — BIPOLAR FORCEPS CORD,BANANA LEADS: Brand: VALLEYLAB

## (undated) DEVICE — LABEL MED CARD MRMC STRL

## (undated) DEVICE — REM POLYHESIVE ADULT PATIENT RETURN ELECTRODE: Brand: VALLEYLAB

## (undated) DEVICE — INTENDED FOR TISSUE SEPARATION, AND OTHER PROCEDURES THAT REQUIRE A SHARP SURGICAL BLADE TO PUNCTURE OR CUT.: Brand: BARD-PARKER ® CARBON RIB-BACK BLADES

## (undated) DEVICE — GOWN,SIRUS,NONRNF,SETINSLV,XL,20/CS: Brand: MEDLINE

## (undated) DEVICE — TRAY CATHETER 16FR F INCLUDE LUB URIN M TEMP SENS 2 STATLOK STBL

## (undated) DEVICE — AORTIC PUNCHES ARE USED TO CREATE A UNIFORM OPENING IN BLOOD VESSELS DURING CARDIOVASCULAR SURGERY. THE VESSEL GRAFT IS INSERTED INTO THE CREATED OPENING AND SUTURED TO THE VESSEL WALL. AORTIC LANCETS ARE USED TO MAKE A SMALL UNIFORM CUT IN A BLOOD VESSEL TO FACILITATE INSERTION OF AN AORTIC PUNCH.  PUNCHES COME IN VARIOUS LENGTHS, DIAMETERS AND TIP CONFIGURATIONS.: Brand: CLEANCUT ROTATING AORTIC PUNCH

## (undated) DEVICE — BLANKET WRM W25XL64IN NONWOVEN SFT LTWT PLIABLE HYPR

## (undated) DEVICE — CLIP INT SM WIDE RED TI TRNSVRS GRV CHEVRON SHP W PRECIS

## (undated) DEVICE — SUT PROL 6-0 30IN C1 DA BLU --

## (undated) DEVICE — NEEDLE HYPO 18GA L1.5IN PNK S STL HUB POLYPR SHLD REG BVL

## (undated) DEVICE — STERILE HOOK LOCK ELASTIC BANDAGE 6IN X 10 YARD: Brand: HOOK LOCK™

## (undated) DEVICE — SOLUTION IRRIG 1000ML H2O STRL BLT

## (undated) DEVICE — SUTURE TICRON DBL ARMED 2 0 CV 305 42IN BLU N ABSRB BRAID 8886303551

## (undated) DEVICE — 3M™ TEGADERM™ TRANSPARENT FILM DRESSING FRAME STYLE, 1626W, 4 IN X 4-3/4 IN (10 CM X 12 CM), 50/CT 4CT/CASE: Brand: 3M™ TEGADERM™

## (undated) DEVICE — TEMP PACING WIRE: Brand: MYO/WIRE

## (undated) DEVICE — COVER,TABLE,60X90,STERILE: Brand: MEDLINE

## (undated) DEVICE — TRANSFER PK BLD PROD 300ML --